# Patient Record
Sex: MALE | Race: WHITE | Employment: UNEMPLOYED | URBAN - METROPOLITAN AREA
[De-identification: names, ages, dates, MRNs, and addresses within clinical notes are randomized per-mention and may not be internally consistent; named-entity substitution may affect disease eponyms.]

---

## 2020-06-22 ENCOUNTER — OFFICE VISIT (OUTPATIENT)
Dept: PRIMARY CARE CLINIC | Age: 60
End: 2020-06-22
Payer: COMMERCIAL

## 2020-06-22 VITALS
WEIGHT: 192.6 LBS | HEART RATE: 84 BPM | HEIGHT: 69 IN | DIASTOLIC BLOOD PRESSURE: 83 MMHG | SYSTOLIC BLOOD PRESSURE: 143 MMHG | RESPIRATION RATE: 14 BRPM | TEMPERATURE: 98.1 F | BODY MASS INDEX: 28.53 KG/M2

## 2020-06-22 PROCEDURE — 99203 OFFICE O/P NEW LOW 30 MIN: CPT | Performed by: NURSE PRACTITIONER

## 2020-06-22 RX ORDER — OMEPRAZOLE 40 MG/1
40 CAPSULE, DELAYED RELEASE ORAL
Qty: 90 CAPSULE | Refills: 1 | Status: SHIPPED | OUTPATIENT
Start: 2020-06-22 | End: 2021-03-24 | Stop reason: SDUPTHER

## 2020-06-22 SDOH — ECONOMIC STABILITY: FOOD INSECURITY: WITHIN THE PAST 12 MONTHS, THE FOOD YOU BOUGHT JUST DIDN'T LAST AND YOU DIDN'T HAVE MONEY TO GET MORE.: NEVER TRUE

## 2020-06-22 SDOH — ECONOMIC STABILITY: TRANSPORTATION INSECURITY
IN THE PAST 12 MONTHS, HAS LACK OF TRANSPORTATION KEPT YOU FROM MEETINGS, WORK, OR FROM GETTING THINGS NEEDED FOR DAILY LIVING?: NO

## 2020-06-22 SDOH — ECONOMIC STABILITY: FOOD INSECURITY: WITHIN THE PAST 12 MONTHS, YOU WORRIED THAT YOUR FOOD WOULD RUN OUT BEFORE YOU GOT MONEY TO BUY MORE.: NEVER TRUE

## 2020-06-22 SDOH — ECONOMIC STABILITY: INCOME INSECURITY: HOW HARD IS IT FOR YOU TO PAY FOR THE VERY BASICS LIKE FOOD, HOUSING, MEDICAL CARE, AND HEATING?: NOT HARD AT ALL

## 2020-06-22 SDOH — ECONOMIC STABILITY: TRANSPORTATION INSECURITY
IN THE PAST 12 MONTHS, HAS THE LACK OF TRANSPORTATION KEPT YOU FROM MEDICAL APPOINTMENTS OR FROM GETTING MEDICATIONS?: NO

## 2020-06-22 ASSESSMENT — ENCOUNTER SYMPTOMS
GLOBUS SENSATION: 0
BELCHING: 0
VOMITING: 0
DIARRHEA: 0
SORE THROAT: 0
CHOKING: 0
WHEEZING: 0
CONSTIPATION: 0
SHORTNESS OF BREATH: 0
COUGH: 0
RHINORRHEA: 0
WATER BRASH: 0
HEARTBURN: 1
NAUSEA: 0
ABDOMINAL PAIN: 0
HOARSE VOICE: 0
STRIDOR: 0

## 2020-06-22 ASSESSMENT — PATIENT HEALTH QUESTIONNAIRE - PHQ9
SUM OF ALL RESPONSES TO PHQ QUESTIONS 1-9: 0
1. LITTLE INTEREST OR PLEASURE IN DOING THINGS: 0
SUM OF ALL RESPONSES TO PHQ9 QUESTIONS 1 & 2: 0
2. FEELING DOWN, DEPRESSED OR HOPELESS: 0
SUM OF ALL RESPONSES TO PHQ QUESTIONS 1-9: 0

## 2020-06-22 NOTE — PROGRESS NOTES
Name: Josie Carreno  : 1960         Chief Complaint:     Chief Complaint   Patient presents with    Establish Care     New patient. Former patient of Dr. Jeri Gaitan.  Weight Loss     \"30 pounds since March. \"        History of Present Illness:      Josie Carreno is a 64 y.o.  male who presents with Establish Care (New patient. Former patient of Dr. Jeri Gaitan. ) and Weight Loss (\"30 pounds since March. \" )      Milind Roland is here today to establish care. Unintended weight loss-patient states he is lost approximately 30 pounds in the last 3 months. Patient states he is a  and has been out of work and been actually more active since not working. 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 Patient's wife was concerned with his weight loss. Patient denies any fatigue, abdominal pain, nausea vomiting, bowel issues. Elevated blood pressure-readings in low 140s over 80s in the office. Patient has not been on medication for hypertension in the past.  We will continue to monitor. Gastroesophageal Reflux   He complains of heartburn. He reports no abdominal pain, no belching, no chest pain, no choking, no coughing, no dysphagia, no early satiety, no globus sensation, no hoarse voice, no nausea, no sore throat, no stridor, no tooth decay, no water brash or no wheezing. This is a chronic problem. The current episode started more than 1 year ago. The problem occurs occasionally. The problem has been unchanged. The heartburn duration is less than a minute. The heartburn is located in the substernum. The heartburn is of moderate intensity. The heartburn does not wake him from sleep. The heartburn does not limit his activity. The heartburn doesn't change with position. The symptoms are aggravated by certain foods, smoking and stress. Pertinent negatives include no anemia, fatigue, melena, muscle weakness, orthopnea or weight loss.  Risk factors include

## 2020-06-29 ENCOUNTER — HOSPITAL ENCOUNTER (OUTPATIENT)
Dept: GENERAL RADIOLOGY | Age: 60
Discharge: HOME | End: 2020-07-01
Payer: COMMERCIAL

## 2020-06-29 ENCOUNTER — TELEPHONE (OUTPATIENT)
Dept: PRIMARY CARE CLINIC | Age: 60
End: 2020-06-29

## 2020-06-29 ENCOUNTER — HOSPITAL ENCOUNTER (OUTPATIENT)
Age: 60
Discharge: HOME | End: 2020-06-29
Payer: COMMERCIAL

## 2020-06-29 LAB
ABSOLUTE EOS #: 0.13 K/UL (ref 0–0.44)
ABSOLUTE IMMATURE GRANULOCYTE: <0.03 K/UL (ref 0–0.3)
ABSOLUTE LYMPH #: 2.42 K/UL (ref 1.1–3.7)
ABSOLUTE MONO #: 0.94 K/UL (ref 0.1–1.2)
ALBUMIN SERPL-MCNC: 3.8 G/DL (ref 3.5–5.2)
ALBUMIN/GLOBULIN RATIO: 1.1 (ref 1–2.5)
ALP BLD-CCNC: 1148 U/L (ref 40–129)
ALT SERPL-CCNC: 126 U/L (ref 5–41)
ANION GAP SERPL CALCULATED.3IONS-SCNC: 12 MMOL/L (ref 9–17)
AST SERPL-CCNC: 137 U/L
BASOPHILS # BLD: 1 % (ref 0–2)
BASOPHILS ABSOLUTE: 0.06 K/UL (ref 0–0.2)
BILIRUB SERPL-MCNC: 1.86 MG/DL (ref 0.3–1.2)
BUN BLDV-MCNC: 8 MG/DL (ref 6–20)
BUN/CREAT BLD: 12 (ref 9–20)
C-REACTIVE PROTEIN: 18.9 MG/L (ref 0–5)
CALCIUM SERPL-MCNC: 9.5 MG/DL (ref 8.6–10.4)
CHLORIDE BLD-SCNC: 99 MMOL/L (ref 98–107)
CHOLESTEROL/HDL RATIO: 2.7
CHOLESTEROL: 267 MG/DL
CO2: 26 MMOL/L (ref 20–31)
CONTROL: PRESENT
CREAT SERPL-MCNC: 0.68 MG/DL (ref 0.7–1.2)
DIFFERENTIAL TYPE: ABNORMAL
EOSINOPHILS RELATIVE PERCENT: 1 % (ref 1–4)
GFR AFRICAN AMERICAN: >60 ML/MIN
GFR NON-AFRICAN AMERICAN: >60 ML/MIN
GFR SERPL CREATININE-BSD FRML MDRD: ABNORMAL ML/MIN/{1.73_M2}
GFR SERPL CREATININE-BSD FRML MDRD: ABNORMAL ML/MIN/{1.73_M2}
GLUCOSE BLD-MCNC: 110 MG/DL (ref 70–99)
HCT VFR BLD CALC: 47.2 % (ref 40.7–50.3)
HDLC SERPL-MCNC: 99 MG/DL
HEMOCCULT STL QL: POSITIVE
HEMOGLOBIN: 15.5 G/DL (ref 13–17)
HEPATITIS C ANTIBODY: NONREACTIVE
IMMATURE GRANULOCYTES: 0 %
LDL CHOLESTEROL: 153 MG/DL (ref 0–130)
LYMPHOCYTES # BLD: 24 % (ref 24–43)
MCH RBC QN AUTO: 34.9 PG (ref 25.2–33.5)
MCHC RBC AUTO-ENTMCNC: 32.8 G/DL (ref 28.4–34.8)
MCV RBC AUTO: 106.3 FL (ref 82.6–102.9)
MONOCYTES # BLD: 9 % (ref 3–12)
NRBC AUTOMATED: 0 PER 100 WBC
PDW BLD-RTO: 12.2 % (ref 11.8–14.4)
PLATELET # BLD: 239 K/UL (ref 138–453)
PLATELET ESTIMATE: ABNORMAL
PMV BLD AUTO: 9.3 FL (ref 8.1–13.5)
POTASSIUM SERPL-SCNC: 4.3 MMOL/L (ref 3.7–5.3)
RBC # BLD: 4.44 M/UL (ref 4.21–5.77)
RBC # BLD: ABNORMAL 10*6/UL
SEG NEUTROPHILS: 65 % (ref 36–65)
SEGMENTED NEUTROPHILS ABSOLUTE COUNT: 6.54 K/UL (ref 1.5–8.1)
SODIUM BLD-SCNC: 137 MMOL/L (ref 135–144)
TOTAL PROTEIN: 7.4 G/DL (ref 6.4–8.3)
TRIGL SERPL-MCNC: 77 MG/DL
TSH SERPL DL<=0.05 MIU/L-ACNC: 1.51 MIU/L (ref 0.3–5)
VLDLC SERPL CALC-MCNC: ABNORMAL MG/DL (ref 1–30)
WBC # BLD: 10.1 K/UL (ref 3.5–11.3)
WBC # BLD: ABNORMAL 10*3/UL

## 2020-06-29 PROCEDURE — 80053 COMPREHEN METABOLIC PANEL: CPT

## 2020-06-29 PROCEDURE — 85025 COMPLETE CBC W/AUTO DIFF WBC: CPT

## 2020-06-29 PROCEDURE — 86803 HEPATITIS C AB TEST: CPT

## 2020-06-29 PROCEDURE — 84443 ASSAY THYROID STIM HORMONE: CPT

## 2020-06-29 PROCEDURE — 86140 C-REACTIVE PROTEIN: CPT

## 2020-06-29 PROCEDURE — 82274 ASSAY TEST FOR BLOOD FECAL: CPT | Performed by: NURSE PRACTITIONER

## 2020-06-29 PROCEDURE — 71046 X-RAY EXAM CHEST 2 VIEWS: CPT

## 2020-06-29 PROCEDURE — 80061 LIPID PANEL: CPT

## 2020-06-29 PROCEDURE — 36415 COLL VENOUS BLD VENIPUNCTURE: CPT

## 2020-06-30 ENCOUNTER — TELEPHONE (OUTPATIENT)
Dept: PRIMARY CARE CLINIC | Age: 60
End: 2020-06-30

## 2020-07-03 PROBLEM — R19.5 POSITIVE FIT (FECAL IMMUNOCHEMICAL TEST): Status: ACTIVE | Noted: 2020-07-03

## 2020-07-03 RX ORDER — SODIUM, POTASSIUM,MAG SULFATES 17.5-3.13G
SOLUTION, RECONSTITUTED, ORAL ORAL
Qty: 2 BOTTLE | Refills: 0 | Status: SHIPPED | OUTPATIENT
Start: 2020-07-03 | End: 2020-08-06

## 2020-07-07 ENCOUNTER — TELEPHONE (OUTPATIENT)
Dept: PRIMARY CARE CLINIC | Age: 60
End: 2020-07-07

## 2020-07-20 ENCOUNTER — TELEPHONE (OUTPATIENT)
Dept: PRIMARY CARE CLINIC | Age: 60
End: 2020-07-20

## 2020-07-20 NOTE — TELEPHONE ENCOUNTER
Lefty message to call office regarding referral with Dr. Timmy Cheng, patient needs to schedule appointment.

## 2020-07-22 ENCOUNTER — TELEPHONE (OUTPATIENT)
Dept: PRIMARY CARE CLINIC | Age: 60
End: 2020-07-22

## 2020-07-28 ENCOUNTER — TELEPHONE (OUTPATIENT)
Dept: PRIMARY CARE CLINIC | Age: 60
End: 2020-07-28

## 2020-08-04 ENCOUNTER — HOSPITAL ENCOUNTER (OUTPATIENT)
Dept: CT IMAGING | Age: 60
Discharge: HOME | End: 2020-08-06
Payer: COMMERCIAL

## 2020-08-04 ENCOUNTER — HOSPITAL ENCOUNTER (OUTPATIENT)
Dept: LAB | Age: 60
Discharge: HOME | End: 2020-08-04
Payer: COMMERCIAL

## 2020-08-04 LAB
HAV IGM SER IA-ACNC: NONREACTIVE
HEPATITIS B CORE IGM ANTIBODY: NONREACTIVE
HEPATITIS B SURFACE ANTIGEN: NONREACTIVE
HEPATITIS C ANTIBODY: NONREACTIVE

## 2020-08-04 PROCEDURE — 6360000004 HC RX CONTRAST MEDICATION: Performed by: NURSE PRACTITIONER

## 2020-08-04 PROCEDURE — 80074 ACUTE HEPATITIS PANEL: CPT

## 2020-08-04 PROCEDURE — 74177 CT ABD & PELVIS W/CONTRAST: CPT

## 2020-08-04 PROCEDURE — 36415 COLL VENOUS BLD VENIPUNCTURE: CPT

## 2020-08-04 RX ADMIN — IOPAMIDOL 75 ML: 755 INJECTION, SOLUTION INTRAVENOUS at 17:27

## 2020-08-04 RX ADMIN — IOPAMIDOL 18 ML: 755 INJECTION, SOLUTION INTRAVENOUS at 16:28

## 2020-08-05 ENCOUNTER — TELEPHONE (OUTPATIENT)
Dept: PRIMARY CARE CLINIC | Age: 60
End: 2020-08-05

## 2020-08-05 NOTE — TELEPHONE ENCOUNTER
Phone call to Kel Torrez in scheduling regarding stat US Gallbladder. Kel Torrez stated that she had already contacted patient and that patient ate today and did not want to fast all day. She also stated that patient said that mornings worked better for him and that [address] was scheduled for Friday. Manpreet Sanchez, DEANNA notified.

## 2020-08-05 NOTE — TELEPHONE ENCOUNTER
8/4/2020 1901- Received call from answering service regarding abnormal CT scan of patient's abdomen. 8/4/2020 1936- Attempted to call patient with no answer, voicemail left to contact me regarding CT scan. 8/4/2020 1941-patient called back. He denies any fever, chills, nausea, vomiting or abdominal pain. Overall he feels well. Discussed results of CT scan. Let him know our office would contact him in the morning for further testing and referral to gastroenterologist.    8/5/2020- 0815-contacted patient to let them know I would like to get ultrasound of his gallbladder today. Discussed that further testing that needs to be done cannot be done in [place]. He is agreeable to see a gastroenterologist and [place].

## 2020-08-06 ENCOUNTER — TELEPHONE (OUTPATIENT)
Dept: PRIMARY CARE CLINIC | Age: 60
End: 2020-08-06

## 2020-08-06 ENCOUNTER — HOSPITAL ENCOUNTER (INPATIENT)
Age: 60
LOS: 3 days | Discharge: ANOTHER HOSPITAL | DRG: 445 | End: 2020-08-09
Attending: INTERNAL MEDICINE | Admitting: INTERNAL MEDICINE
Payer: COMMERCIAL

## 2020-08-06 ENCOUNTER — HOSPITAL ENCOUNTER (OUTPATIENT)
Dept: ULTRASOUND IMAGING | Age: 60
Discharge: HOME | End: 2020-08-08
Payer: COMMERCIAL

## 2020-08-06 ENCOUNTER — HOSPITAL ENCOUNTER (EMERGENCY)
Age: 60
Discharge: ANOTHER HOSPITAL | End: 2020-08-06
Payer: COMMERCIAL

## 2020-08-06 VITALS
OXYGEN SATURATION: 96 % | RESPIRATION RATE: 16 BRPM | BODY MASS INDEX: 28.8 KG/M2 | HEART RATE: 73 BPM | DIASTOLIC BLOOD PRESSURE: 86 MMHG | WEIGHT: 195 LBS | TEMPERATURE: 98.6 F | SYSTOLIC BLOOD PRESSURE: 146 MMHG

## 2020-08-06 PROBLEM — K76.6 PORTAL HYPERTENSION (HCC): Status: ACTIVE | Noted: 2020-08-06

## 2020-08-06 PROBLEM — K81.9 CHOLECYSTITIS: Status: ACTIVE | Noted: 2020-08-06

## 2020-08-06 PROBLEM — Z78.9 ALCOHOL USE: Status: ACTIVE | Noted: 2020-08-06

## 2020-08-06 PROBLEM — K21.9 GASTROESOPHAGEAL REFLUX DISEASE WITHOUT ESOPHAGITIS: Status: ACTIVE | Noted: 2020-08-06

## 2020-08-06 LAB
ABSOLUTE EOS #: 0.12 K/UL (ref 0–0.44)
ABSOLUTE IMMATURE GRANULOCYTE: 0.03 K/UL (ref 0–0.3)
ABSOLUTE LYMPH #: 1.95 K/UL (ref 1.1–3.7)
ABSOLUTE MONO #: 0.64 K/UL (ref 0.1–1.2)
ALBUMIN SERPL-MCNC: 3.9 G/DL (ref 3.5–5.2)
ALBUMIN/GLOBULIN RATIO: 1.3 (ref 1–2.5)
ALP BLD-CCNC: 1073 U/L (ref 40–129)
ALT SERPL-CCNC: 214 U/L (ref 5–41)
ANION GAP SERPL CALCULATED.3IONS-SCNC: 11 MMOL/L (ref 9–17)
AST SERPL-CCNC: 222 U/L
BASOPHILS # BLD: 0 % (ref 0–2)
BASOPHILS ABSOLUTE: 0.03 K/UL (ref 0–0.2)
BILIRUB SERPL-MCNC: 2.76 MG/DL (ref 0.3–1.2)
BUN BLDV-MCNC: 12 MG/DL (ref 6–20)
BUN/CREAT BLD: 18 (ref 9–20)
CALCIUM SERPL-MCNC: 9.4 MG/DL (ref 8.6–10.4)
CHLORIDE BLD-SCNC: 103 MMOL/L (ref 98–107)
CO2: 25 MMOL/L (ref 20–31)
CREAT SERPL-MCNC: 0.65 MG/DL (ref 0.7–1.2)
DIFFERENTIAL TYPE: ABNORMAL
EOSINOPHILS RELATIVE PERCENT: 1 % (ref 1–4)
GFR AFRICAN AMERICAN: >60 ML/MIN
GFR NON-AFRICAN AMERICAN: >60 ML/MIN
GFR SERPL CREATININE-BSD FRML MDRD: ABNORMAL ML/MIN/{1.73_M2}
GFR SERPL CREATININE-BSD FRML MDRD: ABNORMAL ML/MIN/{1.73_M2}
GLUCOSE BLD-MCNC: 122 MG/DL (ref 70–99)
HCT VFR BLD CALC: 41.1 % (ref 40.7–50.3)
HEMOGLOBIN: 14.1 G/DL (ref 13–17)
IMMATURE GRANULOCYTES: 0 %
LIPASE: 54 U/L (ref 13–60)
LYMPHOCYTES # BLD: 20 % (ref 24–43)
MCH RBC QN AUTO: 35.3 PG (ref 25.2–33.5)
MCHC RBC AUTO-ENTMCNC: 34.3 G/DL (ref 28.4–34.8)
MCV RBC AUTO: 102.8 FL (ref 82.6–102.9)
MONOCYTES # BLD: 7 % (ref 3–12)
NRBC AUTOMATED: 0 PER 100 WBC
PDW BLD-RTO: 12.3 % (ref 11.8–14.4)
PLATELET # BLD: 207 K/UL (ref 138–453)
PLATELET ESTIMATE: ABNORMAL
PMV BLD AUTO: 9.2 FL (ref 8.1–13.5)
POTASSIUM SERPL-SCNC: 3.7 MMOL/L (ref 3.7–5.3)
RBC # BLD: 4 M/UL (ref 4.21–5.77)
RBC # BLD: ABNORMAL 10*6/UL
SARS-COV-2, PCR: NORMAL
SARS-COV-2, RAPID: NOT DETECTED
SARS-COV-2: NORMAL
SEG NEUTROPHILS: 72 % (ref 36–65)
SEGMENTED NEUTROPHILS ABSOLUTE COUNT: 7.11 K/UL (ref 1.5–8.1)
SODIUM BLD-SCNC: 139 MMOL/L (ref 135–144)
SOURCE: NORMAL
TOTAL PROTEIN: 6.9 G/DL (ref 6.4–8.3)
WBC # BLD: 9.9 K/UL (ref 3.5–11.3)
WBC # BLD: ABNORMAL 10*3/UL

## 2020-08-06 PROCEDURE — 85025 COMPLETE CBC W/AUTO DIFF WBC: CPT

## 2020-08-06 PROCEDURE — 36415 COLL VENOUS BLD VENIPUNCTURE: CPT

## 2020-08-06 PROCEDURE — C9113 INJ PANTOPRAZOLE SODIUM, VIA: HCPCS | Performed by: CLINICAL NURSE SPECIALIST

## 2020-08-06 PROCEDURE — 2580000003 HC RX 258: Performed by: CLINICAL NURSE SPECIALIST

## 2020-08-06 PROCEDURE — 1200000000 HC SEMI PRIVATE

## 2020-08-06 PROCEDURE — 6360000002 HC RX W HCPCS: Performed by: CLINICAL NURSE SPECIALIST

## 2020-08-06 PROCEDURE — 83690 ASSAY OF LIPASE: CPT

## 2020-08-06 PROCEDURE — U0002 COVID-19 LAB TEST NON-CDC: HCPCS

## 2020-08-06 PROCEDURE — 76705 ECHO EXAM OF ABDOMEN: CPT

## 2020-08-06 PROCEDURE — 99223 1ST HOSP IP/OBS HIGH 75: CPT | Performed by: NURSE PRACTITIONER

## 2020-08-06 PROCEDURE — 99283 EMERGENCY DEPT VISIT LOW MDM: CPT

## 2020-08-06 PROCEDURE — 80053 COMPREHEN METABOLIC PANEL: CPT

## 2020-08-06 RX ORDER — PANTOPRAZOLE SODIUM 40 MG/10ML
40 INJECTION, POWDER, LYOPHILIZED, FOR SOLUTION INTRAVENOUS DAILY
Status: DISCONTINUED | OUTPATIENT
Start: 2020-08-06 | End: 2020-08-08

## 2020-08-06 RX ORDER — ONDANSETRON 2 MG/ML
4 INJECTION INTRAMUSCULAR; INTRAVENOUS EVERY 6 HOURS PRN
Status: DISCONTINUED | OUTPATIENT
Start: 2020-08-06 | End: 2020-08-09 | Stop reason: HOSPADM

## 2020-08-06 RX ORDER — ACETAMINOPHEN 650 MG/1
650 SUPPOSITORY RECTAL EVERY 6 HOURS PRN
Status: DISCONTINUED | OUTPATIENT
Start: 2020-08-06 | End: 2020-08-08

## 2020-08-06 RX ORDER — MAGNESIUM SULFATE 1 G/100ML
1 INJECTION INTRAVENOUS PRN
Status: DISCONTINUED | OUTPATIENT
Start: 2020-08-06 | End: 2020-08-09 | Stop reason: HOSPADM

## 2020-08-06 RX ORDER — PROMETHAZINE HYDROCHLORIDE 25 MG/1
12.5 TABLET ORAL EVERY 6 HOURS PRN
Status: DISCONTINUED | OUTPATIENT
Start: 2020-08-06 | End: 2020-08-09 | Stop reason: HOSPADM

## 2020-08-06 RX ORDER — POLYETHYLENE GLYCOL 3350 17 G/17G
17 POWDER, FOR SOLUTION ORAL DAILY PRN
Status: DISCONTINUED | OUTPATIENT
Start: 2020-08-06 | End: 2020-08-09 | Stop reason: HOSPADM

## 2020-08-06 RX ORDER — ACETAMINOPHEN 325 MG/1
650 TABLET ORAL EVERY 6 HOURS PRN
Status: DISCONTINUED | OUTPATIENT
Start: 2020-08-06 | End: 2020-08-08

## 2020-08-06 RX ORDER — SODIUM CHLORIDE 0.9 % (FLUSH) 0.9 %
10 SYRINGE (ML) INJECTION EVERY 12 HOURS SCHEDULED
Status: DISCONTINUED | OUTPATIENT
Start: 2020-08-06 | End: 2020-08-09 | Stop reason: HOSPADM

## 2020-08-06 RX ORDER — POTASSIUM CHLORIDE 7.45 MG/ML
10 INJECTION INTRAVENOUS PRN
Status: DISCONTINUED | OUTPATIENT
Start: 2020-08-06 | End: 2020-08-09 | Stop reason: HOSPADM

## 2020-08-06 RX ORDER — SODIUM CHLORIDE 9 MG/ML
10 INJECTION INTRAVENOUS DAILY
Status: DISCONTINUED | OUTPATIENT
Start: 2020-08-06 | End: 2020-08-08

## 2020-08-06 RX ORDER — POTASSIUM CHLORIDE 20 MEQ/1
40 TABLET, EXTENDED RELEASE ORAL PRN
Status: DISCONTINUED | OUTPATIENT
Start: 2020-08-06 | End: 2020-08-09 | Stop reason: HOSPADM

## 2020-08-06 RX ORDER — SODIUM CHLORIDE 9 MG/ML
INJECTION, SOLUTION INTRAVENOUS CONTINUOUS
Status: DISCONTINUED | OUTPATIENT
Start: 2020-08-06 | End: 2020-08-09 | Stop reason: HOSPADM

## 2020-08-06 RX ORDER — SODIUM CHLORIDE 0.9 % (FLUSH) 0.9 %
10 SYRINGE (ML) INJECTION PRN
Status: DISCONTINUED | OUTPATIENT
Start: 2020-08-06 | End: 2020-08-09 | Stop reason: HOSPADM

## 2020-08-06 RX ORDER — NICOTINE 21 MG/24HR
1 PATCH, TRANSDERMAL 24 HOURS TRANSDERMAL DAILY PRN
Status: DISCONTINUED | OUTPATIENT
Start: 2020-08-06 | End: 2020-08-09 | Stop reason: HOSPADM

## 2020-08-06 RX ADMIN — SODIUM CHLORIDE: 9 INJECTION, SOLUTION INTRAVENOUS at 20:42

## 2020-08-06 RX ADMIN — Medication 10 ML: at 21:26

## 2020-08-06 RX ADMIN — PANTOPRAZOLE SODIUM 40 MG: 40 INJECTION, POWDER, FOR SOLUTION INTRAVENOUS at 21:26

## 2020-08-06 ASSESSMENT — PAIN SCALES - GENERAL: PAINLEVEL_OUTOF10: 0

## 2020-08-06 ASSESSMENT — ENCOUNTER SYMPTOMS
EYES NEGATIVE: 1
GASTROINTESTINAL NEGATIVE: 1
RESPIRATORY NEGATIVE: 1

## 2020-08-06 NOTE — TELEPHONE ENCOUNTER
Phone call from Veronique Red at Dr. Lamar Res office who stated that Dr. Cheung Adjutant reviewed patient chart and suggests patient go to [facility] or [facility] for admission. States \"He is acute and may need drained, worked up, HIDA Scan and admission. \"

## 2020-08-06 NOTE — ED NOTES
Patient is going by private vehicle to [facility], no distress noted at this time.      Mary Arroyo RN  08/06/20 9341

## 2020-08-06 NOTE — ED NOTES
Call  [facility]     Juan Ramon Dean  08/06/20 2730

## 2020-08-06 NOTE — ED PROVIDER NOTES
[facility]  EMERGENCY DEPARTMENT ENCOUNTER      Pt Name: Yoel Barragan  MRN: [id number]  [place] 1960  Date of evaluation: 8/6/2020  Provider: Radha Mckenzie PA-C    CHIEF COMPLAINT       Chief Complaint   Patient presents with    Other     pt states he had a CT this am and was told to come to the ER to be transferred to [facility] for a mass in his gallbladder       HISTORY OF PRESENT ILLNESS    Yoel Barragan is a 64 y.o. male who presents to the emergency department from home to be transferred to [facility].  Patient had a CT scan yesterday and an ultrasound today which were reviewed by the surgeon on-call and thought that it was cholecystitis and may need a drain and could not care for him at this hospital so they recommended he be transferred to [address] [place] [address] in [facility] which is where the patient would like to go. This was discussed with the patient's PCP who then recommended that he come to [facility], ER so that and a transfer can be arranged. He states he is not having much pain he states the pain that he gets in his upper abdomen comes in waves and can be intense but this is only happened 3 times and he really has only minimal discomfort at this time with no nausea vomiting fevers chills. Denies chest pain cough shortness of breath or sore throat. Triage notes and Nursing notes were reviewed by myself. Any discrepancies are addressed above. PAST MEDICAL HISTORY     Past Medical History:   Diagnosis Date    Anxiety     Chronic back pain     Depression        SURGICAL HISTORY       Past Surgical History:   Procedure Laterality Date    MANDIBLE SURGERY         CURRENT MEDICATIONS       Previous Medications    IBUPROFEN (ADVIL;MOTRIN) 600 MG TABLET    Take 1 tablet by mouth 3 times daily (with meals) for 7 days.     OMEPRAZOLE (PRILOSEC) 40 MG DELAYED RELEASE CAPSULE    Take 1 capsule by mouth every morning (before breakfast)       ALLERGIES     Asa [aspirin] and Chocolate    FAMILY HISTORY       Family History   Problem Relation Age of Onset    Hypertension Mother     Stroke Mother     High Blood Pressure Mother     Diabetes Father     Hypertension Father     High Blood Pressure Father     Heart Disease Father         SOCIAL HISTORY       Social History     Socioeconomic History    Marital status:      Spouse name: Aliya    Number of children: 2    Years of education: 15    Highest education level: None   Occupational History    Occupation:    Social Needs    Financial resource strain: Not hard at all   Mark-Shantanu insecurity     Worry: Never true     Inability: Never true    Transportation needs     Medical: No     Non-medical: No   Tobacco Use    Smoking status: Current Every Day Smoker     Packs/day: 1.00     Years: 20.00     Pack years: 20.00     Types: Cigarettes    Smokeless tobacco: Never Used   Substance and Sexual Activity    Alcohol use: Yes     Alcohol/week: 5.8 standard drinks     Types: 7 Standard drinks or equivalent per week     Comment: Occ    Drug use: No    Sexual activity: Yes   Lifestyle    Physical activity     Days per week: None     Minutes per session: None    Stress: None   Relationships    Social connections     Talks on phone: None     Gets together: None     Attends Confucianist service: None     Active member of club or organization: None     Attends meetings of clubs or organizations: None     Relationship status: None    Intimate partner violence     Fear of current or ex partner: None     Emotionally abused: None     Physically abused: None     Forced sexual activity: None   Other Topics Concern    None   Social History Narrative    None       REVIEW OF SYSTEMS     Review of Systems  Except as noted above the remainder of the review of systems was reviewed and is negative.      SCREENINGS           PHYSICAL EXAM    (up to 7 for level 4, 8 or more for level 5)     ED Triage Vitals [08/06/20 1459]   BP Temp Temp Source Pulse Resp SpO2 Height Weight   (!) 155/88 98.6 °F (37 °C) Tympanic 73 16 98 % -- 195 lb (88.5 kg)       Physical Exam  Active and oriented ×3. Nontoxic. No acute distress. Well-hydrated. Head is atraumatic, facies symmetrical.  Pupils equal round and reactive to light, extraocular movements intact, anterior chambers clear bilaterally  Neck is supple. No adenopathy. Respirations nonlabored. Lungs clear to auscultation. No wheezes rales or rhonchi noted. Heart regular rate and rhythm. No murmur noted  Abdomen positive bowel sounds, no bruit. soft with minimal tenderness in the epigastric and right upper quadrant. Skin free of any obvious rashes or lesions. Extremities without edema. No calf tenderness noted. Distal pulses and sensation intact. Good capillary refill noted. No acute neurologic deficit noted. Good gait and balance. Clear speech. Good affect. Pleasant patient. DIAGNOSTIC RESULTS     RADIOLOGY: (none if blank)   Interpretation per the Radiologistbelow, if available at the time of this note:    No orders to display     EXAMINATION:    RIGHT UPPER QUADRANT ULTRASOUND         8/6/2020 7:13 am         COMPARISON:    CT dated 08/04/2020         HISTORY:    ORDERING SYSTEM PROVIDED HISTORY: Intrahepatic bile duct dilation         FINDINGS:    LIVER:  The liver is diffusely increased and coarsened in echotexture. Multiple hepatic cysts are noted, measuring up to 5.2 cm.  There is diffuse    intrahepatic biliary dilatation.  Main portal vein is patent.         BILIARY SYSTEM:  Diffuse low-level mobile echoes are filling the gallbladder    lumen, suggesting gallbladder sludge.  Color Doppler is demonstrated in the    lumen of the gallbladder, but this is likely artifact due to mobile sludge.     The gallbladder wall is thickened and indistinct, measuring approximately 0.5    cm.  Gallstones are noted. [place] is a small amount of pericholecystic fluid.         Common bile duct is dilated, measuring up to 1 cm.  No intraluminal filling    defect seen in the common bile duct.  Sonographer reports a negative Logan's    sign.         RIGHT KIDNEY: No hydronephrosis. Meagan Yari is an isoechoic mass in the central    portion of the right kidney, most likely a hypertrophied column of Guillermo,    based on CT appearance.         PANCREAS:  Visualized portions of the pancreas are unremarkable.  No    pancreatic ductal dilatation.         OTHER: No evidence of right upper quadrant ascites. 1.  Gallbladder is filled with sludge and stones in addition to gallbladder    wall thickening and pericholecystic fluid.  Overall, findings are concerning    for acute cholecystitis.  However, sonographer reports a negative Logan's    sign.  There is Doppler color within the gallbladder lumen, but this is    likely artifactual related to mobile sludge.  Possibility of an underlying    isoechoic polyp or mass would be difficult to exclude.         2.  Intrahepatic and extrahepatic biliary dilatation.  Common bile duct    measures up to 1 cm.  Additional evaluation with MRCP/ERCP is recommended.         3.  Hepatic cysts.         4.  Increased and coarsened echotexture of the liver, which can be seen with    diffuse hepatocellular disease, most commonly hepatic steatosis.         5.  Isoechoic mass seen in the central right kidney is most likely a normal    variant column of Guillermo. EXAMINATION:    CT OF THE ABDOMEN AND PELVIS WITH CONTRAST, 8/4/2020 5:26 pm         TECHNIQUE:    CT of the abdomen and pelvis was performed with the administration of    intravenous contrast. Multiplanar reformatted images are provided for review.     Dose modulation, iterative reconstruction, and/or weight based adjustment of    the mA/kV was utilized to reduce the radiation dose to as low as reasonably    achievable.         COMPARISON:    None         HISTORY:    ORDERING SYSTEM PROVIDED HISTORY: Elevated liver enzymes TECHNOLOGIST PROVIDED HISTORY:         FINDINGS:    Lower Chest: No acute infiltrate at the lung bases.         Organs: Mild hepatomegaly.  No significant cirrhotic changes are identified. There are multiple low-attenuation hepatic lesions most consistent with    simple cysts.  The largest in the left lobe anteriorly measures 5.0 cm. There is moderate intrahepatic biliary dilatation.  The common bile duct is    not dilated.  Increased attenuation of the gallbladder lumen with gallbladder    wall thickening and small calculi.  Mild splenomegaly.  The pancreas and    adrenal glands are unremarkable.  No renal mass or significant hydronephrosis.         GI/Bowel: No pericolonic inflammatory changes.  The appendix is unremarkable. There is no small bowel distension.  The stomach and duodenal sweep are    intact.         Pelvis: There is no pelvic mass or free pelvic fluid.  The prostate is    borderline in size.  Mild distention of the urinary bladder.         Peritoneum/Retroperitoneum: The abdominal aorta is normal in caliber with    mild calcified atherosclerotic plaque.  No significant retroperitoneal    adenopathy.  No upper abdominal ascites.         The splenic vein, portal vein and SMV are all patent.  Multiple prominent    upper abdominal varices as well as distal esophageal varices are noted.  The    umbilical vein is enlarged.         Bones/Soft Tissues: No acute osseous or soft tissue abnormality.  Small fat    containing umbilical hernia.  Degenerative disc disease at L5-S1.              Impression    1. Gallbladder wall thickening with cholelithiasis and increased attenuation    of the gallbladder lumen.  Differential includes acute cholecystitis. 2. Intrahepatic biliary dilatation with nondilated common bile duct.  This    may be related to the adjacent inflammatory changes within the gallbladder. Alternatively, tumor such as a Klatskin's tumor are possibilities.     3. Portal hypertension with enlarged upper abdominal varices and distal    esophageal varices.  Mild splenomegaly.  No significant ascites. Hepatomegaly.  Multiple hepatic lesions most consistent with simple cysts. 4. No acute bowel pathology. Findings were discussed with Dr. Moses Sanchez at 7:05 pm on 8/4/2020.         RECOMMENDATIONS:    Recommend follow-up evaluation of the gallbladder with ultrasound. Additional evaluation of the biliary tract findings can be performed with    MRCP or ERCP.                  LABS:  Labs Reviewed   CBC WITH AUTO DIFFERENTIAL - Abnormal; Notable for the following components:       Result Value    RBC 4.00 (*)     MCH 35.3 (*)     Seg Neutrophils 72 (*)     Lymphocytes 20 (*)     All other components within normal limits   COMPREHENSIVE METABOLIC PANEL W/ REFLEX TO MG FOR LOW K - Abnormal; Notable for the following components:    Glucose 122 (*)     CREATININE 0.65 (*)     Alkaline Phosphatase 1,073 (*)      (*)      (*)     Total Bilirubin 2.76 (*)     All other components within normal limits   LIPASE   COVID-19       All other labs were within normal range or not returned as of this dictation. EMERGENCY DEPARTMENT COURSE andMedical Decision Making:     Vitals:    Vitals:    08/06/20 1459   BP: (!) 155/88   Pulse: 73   Resp: 16   Temp: 98.6 °F (37 °C)   TempSrc: Tympanic   SpO2: 98%   Weight: 195 lb (88.5 kg)       MDM/     Patient has elevated LFTs and findings consistent with cholecystitis on CT scan performed earlier today. The surgeon on-call at this hospital, Dr. Josafat Tse, reviewed the imaging studies and felt the patient should not be cared for at this hospital but needed a higher level of care the patient wants to go to [address] [place] [address] have spoken to the hospitalist at [address] [place] [address] who accepts the patient in transfer under Dr. Moy Manrique.         ED Medications administered this visit:  Medications - No data to display    CONSULTS: (None if blank)  None    Procedures: (None if blank)       CLINICAL       1.  Cholecystitis          DISPOSITION/PLAN   DISPOSITION Decision To Transfer 08/06/2020 03:47:46 PM          (Please note that portions of this note were completed with a voice recognition program.  Efforts were made to edit the dictations but occasionallywords are mis-transcribed.)      Burdette Olszewski II, PA-C (electronically signed)           Burdette Olszewski II, PA-C  08/06/20 5926

## 2020-08-07 ENCOUNTER — ANESTHESIA EVENT (OUTPATIENT)
Dept: OPERATING ROOM | Age: 60
DRG: 445 | End: 2020-08-07
Payer: COMMERCIAL

## 2020-08-07 ENCOUNTER — ANESTHESIA (OUTPATIENT)
Dept: OPERATING ROOM | Age: 60
DRG: 445 | End: 2020-08-07
Payer: COMMERCIAL

## 2020-08-07 ENCOUNTER — APPOINTMENT (OUTPATIENT)
Dept: GENERAL RADIOLOGY | Age: 60
DRG: 445 | End: 2020-08-07
Attending: INTERNAL MEDICINE
Payer: COMMERCIAL

## 2020-08-07 VITALS — DIASTOLIC BLOOD PRESSURE: 109 MMHG | TEMPERATURE: 96.1 F | SYSTOLIC BLOOD PRESSURE: 186 MMHG | OXYGEN SATURATION: 98 %

## 2020-08-07 PROBLEM — K83.8 INTRAHEPATIC BILE DUCT DILATION: Status: ACTIVE | Noted: 2020-08-07

## 2020-08-07 PROBLEM — F10.10 ALCOHOL ABUSE: Status: ACTIVE | Noted: 2020-08-07

## 2020-08-07 PROBLEM — I85.00 ESOPHAGEAL VARICES WITHOUT BLEEDING (HCC): Status: ACTIVE | Noted: 2020-08-07

## 2020-08-07 PROBLEM — R17 ELEVATED BILIRUBIN: Status: ACTIVE | Noted: 2020-08-07

## 2020-08-07 PROBLEM — R97.8 ELEVATED CA 19-9 LEVEL: Status: ACTIVE | Noted: 2020-08-07

## 2020-08-07 PROBLEM — C24.0 KLATSKIN'S TUMOR (HCC): Status: ACTIVE | Noted: 2020-08-07

## 2020-08-07 LAB
ALBUMIN SERPL-MCNC: 3.3 G/DL (ref 3.5–5.2)
ALBUMIN/GLOBULIN RATIO: 1.1 (ref 1–2.5)
ALP BLD-CCNC: 1031 U/L (ref 40–129)
ALT SERPL-CCNC: 197 U/L (ref 5–41)
ANION GAP SERPL CALCULATED.3IONS-SCNC: 12 MMOL/L (ref 9–17)
AST SERPL-CCNC: 217 U/L
BILIRUB SERPL-MCNC: 4.12 MG/DL (ref 0.3–1.2)
BILIRUBIN DIRECT: 3.47 MG/DL
BILIRUBIN, INDIRECT: 0.65 MG/DL (ref 0–1)
BUN BLDV-MCNC: 8 MG/DL (ref 6–20)
CA 19-9: 225 U/ML (ref 0–35)
CA 19-9: 232 U/ML (ref 0–35)
CALCIUM SERPL-MCNC: 8.8 MG/DL (ref 8.6–10.4)
CARCINOEMBRYONIC ANTIGEN: 3.8 NG/ML
CASE NUMBER:: NORMAL
CHLORIDE BLD-SCNC: 104 MMOL/L (ref 98–107)
CO2: 22 MMOL/L (ref 20–31)
CREAT SERPL-MCNC: 0.52 MG/DL (ref 0.7–1.2)
GFR AFRICAN AMERICAN: >60 ML/MIN
GFR NON-AFRICAN AMERICAN: >60 ML/MIN
GFR SERPL CREATININE-BSD FRML MDRD: ABNORMAL ML/MIN/{1.73_M2}
GFR SERPL CREATININE-BSD FRML MDRD: ABNORMAL ML/MIN/{1.73_M2}
GLUCOSE BLD-MCNC: 105 MG/DL (ref 70–99)
HCT VFR BLD CALC: 41.8 % (ref 40.7–50.3)
HEMOGLOBIN: 13.9 G/DL (ref 13–17)
INR BLD: 0.9
MCH RBC QN AUTO: 34.8 PG (ref 25.2–33.5)
MCHC RBC AUTO-ENTMCNC: 33.3 G/DL (ref 28.4–34.8)
MCV RBC AUTO: 104.5 FL (ref 82.6–102.9)
NRBC AUTOMATED: 0 PER 100 WBC
PDW BLD-RTO: 12.7 % (ref 11.8–14.4)
PLATELET # BLD: 196 K/UL (ref 138–453)
PMV BLD AUTO: 9.7 FL (ref 8.1–13.5)
POTASSIUM SERPL-SCNC: 4.1 MMOL/L (ref 3.7–5.3)
PROTHROMBIN TIME: 9.8 SEC (ref 9–12)
RBC # BLD: 4 M/UL (ref 4.21–5.77)
SODIUM BLD-SCNC: 138 MMOL/L (ref 135–144)
SPECIMEN DESCRIPTION: NORMAL
TOTAL PROTEIN: 6.2 G/DL (ref 6.4–8.3)
WBC # BLD: 7.8 K/UL (ref 3.5–11.3)

## 2020-08-07 PROCEDURE — C1769 GUIDE WIRE: HCPCS | Performed by: INTERNAL MEDICINE

## 2020-08-07 PROCEDURE — 43274 ERCP DUCT STENT PLACEMENT: CPT | Performed by: INTERNAL MEDICINE

## 2020-08-07 PROCEDURE — 82378 CARCINOEMBRYONIC ANTIGEN: CPT

## 2020-08-07 PROCEDURE — 6360000002 HC RX W HCPCS: Performed by: INTERNAL MEDICINE

## 2020-08-07 PROCEDURE — 85610 PROTHROMBIN TIME: CPT

## 2020-08-07 PROCEDURE — 86301 IMMUNOASSAY TUMOR CA 19-9: CPT

## 2020-08-07 PROCEDURE — 2580000003 HC RX 258: Performed by: INTERNAL MEDICINE

## 2020-08-07 PROCEDURE — 88305 TISSUE EXAM BY PATHOLOGIST: CPT

## 2020-08-07 PROCEDURE — 1200000000 HC SEMI PRIVATE

## 2020-08-07 PROCEDURE — C9113 INJ PANTOPRAZOLE SODIUM, VIA: HCPCS | Performed by: INTERNAL MEDICINE

## 2020-08-07 PROCEDURE — 80053 COMPREHEN METABOLIC PANEL: CPT

## 2020-08-07 PROCEDURE — C2625 STENT, NON-COR, TEM W/DEL SY: HCPCS | Performed by: INTERNAL MEDICINE

## 2020-08-07 PROCEDURE — 74330 X-RAY BILE/PANC ENDOSCOPY: CPT

## 2020-08-07 PROCEDURE — 3700000001 HC ADD 15 MINUTES (ANESTHESIA): Performed by: INTERNAL MEDICINE

## 2020-08-07 PROCEDURE — 3609015100 HC ERCP STENT PLACEMENT BILIARY/PANCREATIC DUCT: Performed by: INTERNAL MEDICINE

## 2020-08-07 PROCEDURE — 88112 CYTOPATH CELL ENHANCE TECH: CPT

## 2020-08-07 PROCEDURE — 7100000000 HC PACU RECOVERY - FIRST 15 MIN: Performed by: INTERNAL MEDICINE

## 2020-08-07 PROCEDURE — 99253 IP/OBS CNSLTJ NEW/EST LOW 45: CPT | Performed by: INTERNAL MEDICINE

## 2020-08-07 PROCEDURE — 6370000000 HC RX 637 (ALT 250 FOR IP): Performed by: INTERNAL MEDICINE

## 2020-08-07 PROCEDURE — 6360000002 HC RX W HCPCS: Performed by: SPECIALIST

## 2020-08-07 PROCEDURE — 99233 SBSQ HOSP IP/OBS HIGH 50: CPT | Performed by: INTERNAL MEDICINE

## 2020-08-07 PROCEDURE — 7100000001 HC PACU RECOVERY - ADDTL 15 MIN: Performed by: INTERNAL MEDICINE

## 2020-08-07 PROCEDURE — 2720000010 HC SURG SUPPLY STERILE: Performed by: INTERNAL MEDICINE

## 2020-08-07 PROCEDURE — 2709999900 HC NON-CHARGEABLE SUPPLY: Performed by: INTERNAL MEDICINE

## 2020-08-07 PROCEDURE — 6360000002 HC RX W HCPCS: Performed by: ANESTHESIOLOGY

## 2020-08-07 PROCEDURE — 36415 COLL VENOUS BLD VENIPUNCTURE: CPT

## 2020-08-07 PROCEDURE — 2500000003 HC RX 250 WO HCPCS: Performed by: SPECIALIST

## 2020-08-07 PROCEDURE — 82248 BILIRUBIN DIRECT: CPT

## 2020-08-07 PROCEDURE — 85027 COMPLETE CBC AUTOMATED: CPT

## 2020-08-07 PROCEDURE — 3700000000 HC ANESTHESIA ATTENDED CARE: Performed by: INTERNAL MEDICINE

## 2020-08-07 PROCEDURE — 0F758DZ DILATION OF RIGHT HEPATIC DUCT WITH INTRALUMINAL DEVICE, VIA NATURAL OR ARTIFICIAL OPENING ENDOSCOPIC: ICD-10-PCS | Performed by: INTERNAL MEDICINE

## 2020-08-07 DEVICE — BILIARY STENT WITH NAVIFLEXTM RX DELIVERY SYSTEM
Type: IMPLANTABLE DEVICE | Site: BILE DUCT | Status: FUNCTIONAL
Brand: ADVANIX™ BILIARY

## 2020-08-07 RX ORDER — FENTANYL CITRATE 50 UG/ML
50 INJECTION, SOLUTION INTRAMUSCULAR; INTRAVENOUS EVERY 5 MIN PRN
Status: DISCONTINUED | OUTPATIENT
Start: 2020-08-07 | End: 2020-08-07 | Stop reason: HOSPADM

## 2020-08-07 RX ORDER — NEOSTIGMINE METHYLSULFATE 5 MG/5 ML
SYRINGE (ML) INTRAVENOUS PRN
Status: DISCONTINUED | OUTPATIENT
Start: 2020-08-07 | End: 2020-08-07 | Stop reason: SDUPTHER

## 2020-08-07 RX ORDER — MEPERIDINE HYDROCHLORIDE 50 MG/ML
12.5 INJECTION INTRAMUSCULAR; INTRAVENOUS; SUBCUTANEOUS EVERY 5 MIN PRN
Status: DISCONTINUED | OUTPATIENT
Start: 2020-08-07 | End: 2020-08-07 | Stop reason: HOSPADM

## 2020-08-07 RX ORDER — GLYCOPYRROLATE 1 MG/5 ML
SYRINGE (ML) INTRAVENOUS PRN
Status: DISCONTINUED | OUTPATIENT
Start: 2020-08-07 | End: 2020-08-07 | Stop reason: SDUPTHER

## 2020-08-07 RX ORDER — FENTANYL CITRATE 50 UG/ML
25 INJECTION, SOLUTION INTRAMUSCULAR; INTRAVENOUS EVERY 5 MIN PRN
Status: DISCONTINUED | OUTPATIENT
Start: 2020-08-07 | End: 2020-08-07 | Stop reason: HOSPADM

## 2020-08-07 RX ORDER — LIDOCAINE HYDROCHLORIDE 10 MG/ML
INJECTION, SOLUTION EPIDURAL; INFILTRATION; INTRACAUDAL; PERINEURAL PRN
Status: DISCONTINUED | OUTPATIENT
Start: 2020-08-07 | End: 2020-08-07 | Stop reason: SDUPTHER

## 2020-08-07 RX ORDER — PHENYLEPHRINE HYDROCHLORIDE 10 MG/ML
INJECTION INTRAVENOUS PRN
Status: DISCONTINUED | OUTPATIENT
Start: 2020-08-07 | End: 2020-08-07 | Stop reason: SDUPTHER

## 2020-08-07 RX ORDER — FENTANYL CITRATE 50 UG/ML
INJECTION, SOLUTION INTRAMUSCULAR; INTRAVENOUS PRN
Status: DISCONTINUED | OUTPATIENT
Start: 2020-08-07 | End: 2020-08-07 | Stop reason: SDUPTHER

## 2020-08-07 RX ORDER — ONDANSETRON 2 MG/ML
4 INJECTION INTRAMUSCULAR; INTRAVENOUS
Status: DISCONTINUED | OUTPATIENT
Start: 2020-08-07 | End: 2020-08-07 | Stop reason: HOSPADM

## 2020-08-07 RX ORDER — ONDANSETRON 2 MG/ML
INJECTION INTRAMUSCULAR; INTRAVENOUS PRN
Status: DISCONTINUED | OUTPATIENT
Start: 2020-08-07 | End: 2020-08-07 | Stop reason: SDUPTHER

## 2020-08-07 RX ORDER — ROCURONIUM BROMIDE 10 MG/ML
INJECTION, SOLUTION INTRAVENOUS PRN
Status: DISCONTINUED | OUTPATIENT
Start: 2020-08-07 | End: 2020-08-07 | Stop reason: SDUPTHER

## 2020-08-07 RX ORDER — PROPOFOL 10 MG/ML
INJECTION, EMULSION INTRAVENOUS PRN
Status: DISCONTINUED | OUTPATIENT
Start: 2020-08-07 | End: 2020-08-07 | Stop reason: SDUPTHER

## 2020-08-07 RX ADMIN — ONDANSETRON 4 MG: 2 INJECTION, SOLUTION INTRAMUSCULAR; INTRAVENOUS at 10:24

## 2020-08-07 RX ADMIN — FENTANYL CITRATE 50 MCG: 50 INJECTION, SOLUTION INTRAMUSCULAR; INTRAVENOUS at 10:52

## 2020-08-07 RX ADMIN — Medication 4 MG: at 10:28

## 2020-08-07 RX ADMIN — Medication 0.6 MG: at 10:28

## 2020-08-07 RX ADMIN — PROPOFOL 200 MG: 10 INJECTION, EMULSION INTRAVENOUS at 09:29

## 2020-08-07 RX ADMIN — PHENYLEPHRINE HYDROCHLORIDE 100 MCG: 10 INJECTION INTRAVENOUS at 09:53

## 2020-08-07 RX ADMIN — PHENYLEPHRINE HYDROCHLORIDE 100 MCG: 10 INJECTION INTRAVENOUS at 09:50

## 2020-08-07 RX ADMIN — LIDOCAINE HYDROCHLORIDE 50 MG: 10 INJECTION, SOLUTION EPIDURAL; INFILTRATION; INTRACAUDAL; PERINEURAL at 09:29

## 2020-08-07 RX ADMIN — ROCURONIUM BROMIDE 50 MG: 10 INJECTION INTRAVENOUS at 09:29

## 2020-08-07 RX ADMIN — Medication 10 ML: at 12:53

## 2020-08-07 RX ADMIN — PANTOPRAZOLE SODIUM 40 MG: 40 INJECTION, POWDER, FOR SOLUTION INTRAVENOUS at 12:53

## 2020-08-07 RX ADMIN — FENTANYL CITRATE 25 MCG: 50 INJECTION, SOLUTION INTRAMUSCULAR; INTRAVENOUS at 11:00

## 2020-08-07 RX ADMIN — FENTANYL CITRATE 100 MCG: 50 INJECTION INTRAMUSCULAR; INTRAVENOUS at 09:29

## 2020-08-07 ASSESSMENT — PULMONARY FUNCTION TESTS
PIF_VALUE: 22
PIF_VALUE: 17
PIF_VALUE: 23
PIF_VALUE: 2
PIF_VALUE: 1
PIF_VALUE: 15
PIF_VALUE: 23
PIF_VALUE: 1
PIF_VALUE: 19
PIF_VALUE: 15
PIF_VALUE: 21
PIF_VALUE: 22
PIF_VALUE: 21
PIF_VALUE: 21
PIF_VALUE: 22
PIF_VALUE: 22
PIF_VALUE: 19
PIF_VALUE: 25
PIF_VALUE: 21
PIF_VALUE: 16
PIF_VALUE: 1
PIF_VALUE: 23
PIF_VALUE: 16
PIF_VALUE: 27
PIF_VALUE: 17
PIF_VALUE: 20
PIF_VALUE: 23
PIF_VALUE: 21
PIF_VALUE: 21
PIF_VALUE: 22
PIF_VALUE: 22
PIF_VALUE: 15
PIF_VALUE: 25
PIF_VALUE: 22
PIF_VALUE: 21
PIF_VALUE: 1
PIF_VALUE: 23
PIF_VALUE: 16
PIF_VALUE: 20
PIF_VALUE: 22
PIF_VALUE: 1
PIF_VALUE: 21
PIF_VALUE: 1
PIF_VALUE: 23
PIF_VALUE: 24
PIF_VALUE: 2
PIF_VALUE: 22
PIF_VALUE: 24
PIF_VALUE: 22
PIF_VALUE: 23
PIF_VALUE: 29
PIF_VALUE: 21
PIF_VALUE: 32
PIF_VALUE: 22
PIF_VALUE: 22
PIF_VALUE: 0
PIF_VALUE: 21
PIF_VALUE: 16
PIF_VALUE: 22
PIF_VALUE: 1
PIF_VALUE: 0
PIF_VALUE: 22
PIF_VALUE: 25
PIF_VALUE: 4
PIF_VALUE: 21
PIF_VALUE: 0
PIF_VALUE: 22
PIF_VALUE: 22
PIF_VALUE: 23
PIF_VALUE: 15
PIF_VALUE: 21
PIF_VALUE: 12

## 2020-08-07 ASSESSMENT — PAIN SCALES - GENERAL
PAINLEVEL_OUTOF10: 7
PAINLEVEL_OUTOF10: 2
PAINLEVEL_OUTOF10: 5
PAINLEVEL_OUTOF10: 7
PAINLEVEL_OUTOF10: 3

## 2020-08-07 ASSESSMENT — PAIN DESCRIPTION - PAIN TYPE
TYPE: ACUTE PAIN
TYPE: SURGICAL PAIN

## 2020-08-07 ASSESSMENT — PAIN DESCRIPTION - LOCATION
LOCATION: ABDOMEN
LOCATION: ABDOMEN

## 2020-08-07 ASSESSMENT — PAIN - FUNCTIONAL ASSESSMENT: PAIN_FUNCTIONAL_ASSESSMENT: 0-10

## 2020-08-07 NOTE — FLOWSHEET NOTE
08/07/20 1615   Encounter Summary   Services provided to: Patient and family together   Referral/Consult From: Multi-disciplinary team;Rounding   Support System Spouse; Children   Place of Protestant   (Unknown)   Contact [place] No   Continue Visiting   (08/07/2020)   Complexity of Encounter Low   Length of Encounter 15 minutes   Spiritual Assessment Completed Yes   Routine   Type Initial   Assessment Calm; Approachable   Intervention Explored feelings, thoughts, concerns;Provided reading materials/devotional materials;Sustaining presence/ Ministry of presence   Outcome Expressed gratitude   Assessment: Patient is a 64year old male, his chart diagnosis states Cholecystitis. Intervention: Patient was talking with the nurses at the nurses station when Stacy arrived on unit. Patient returned to room and was in good spirits, he was awaiting test results to determine if he would be staying overnight in the hospital.  Patient and spouse stated that they were fine and did not need anything at the time of  visit.  provided a presence of spiritual support.  provided a copy of  [employer] devotional to family. Outcome: Patient was appreciative of the devotional and the Stacy visit.

## 2020-08-07 NOTE — ANESTHESIA PRE PROCEDURE
suppository 650 mg  650 mg Rectal Q6H PRN Mart Yfn, APRN - CNS        polyethylene glycol (GLYCOLAX) packet 17 g  17 g Oral Daily PRN Stanton Yfn, APRN - CNS        promethazine (PHENERGAN) tablet 12.5 mg  12.5 mg Oral Q6H PRN Mart Yfn, APRN - CNS        Or    ondansetron (ZOFRAN) injection 4 mg  4 mg Intravenous Q6H PRN Mart Yfn, APRN - CNS        nicotine (NICODERM CQ) 21 MG/24HR 1 patch  1 patch Transdermal Daily PRN Stanton Yfn, APRN - CNS        enoxaparin (LOVENOX) injection 40 mg  40 mg Subcutaneous Daily Stanton Yfn, APRN - CNS        0.9 % sodium chloride infusion   Intravenous Continuous RomeJUAN CARLOS Ballard  mL/hr at 08/06/20 7835         Allergies: Allergies   Allergen Reactions    Asa [Aspirin]     Chocolate Swelling       Problem List:    Patient Active Problem List   Diagnosis Code    Positive FIT (fecal immunochemical test) R19.5    Cholecystitis K81.9    Gastroesophageal reflux disease without esophagitis K21.9    Alcohol use Z72.89    Portal hypertension ([facility] [address] K76.6       Past Medical History:        Diagnosis Date    Anxiety     Chronic back pain     Depression     Gastroesophageal reflux disease without esophagitis 8/6/2020       Past Surgical History:        Procedure Laterality Date    MANDIBLE SURGERY         Social History:    Social History     Tobacco Use    Smoking status: Current Every Day Smoker     Packs/day: 1.00     Years: 20.00     Pack years: 20.00     Types: Cigarettes    Smokeless tobacco: Never Used   Substance Use Topics    Alcohol use: Yes     Alcohol/week: 5.8 standard drinks     Types: 7 Standard drinks or equivalent per week     Comment:  Occ                                Ready to quit: Not Answered  Counseling given: Not Answered      Vital Signs (Current):   Vitals:    08/06/20 1903 08/07/20 0827   BP: (!) 145/80 134/76   Pulse: 83 58   Resp: 16 16   Temp: 98.4 °F (36.9 °C) 97.6 °F (36.4 °C)   TempSrc: Oral SpO2:  97%   Weight: 195 lb (88.5 kg)    Height: 5' 9\" (1.753 m)                                               BP Readings from Last 3 Encounters:   08/07/20 134/76   08/06/20 (!) 146/86   06/22/20 (!) 143/83       NPO Status:                                                                                 BMI:   Wt Readings from Last 3 Encounters:   08/06/20 195 lb (88.5 kg)   08/06/20 195 lb (88.5 kg)   06/22/20 192 lb 9.6 oz (87.4 kg)     Body mass index is 28.8 kg/m². CBC:   Lab Results   Component Value Date    WBC 7.8 08/07/2020    RBC 4.00 08/07/2020    HGB 13.9 08/07/2020    HCT 41.8 08/07/2020    .5 08/07/2020    RDW 12.7 08/07/2020     08/07/2020       CMP:   Lab Results   Component Value Date     08/07/2020    K 4.1 08/07/2020     08/07/2020    CO2 22 08/07/2020    BUN 8 08/07/2020    CREATININE 0.52 08/07/2020    GFRAA >60 08/07/2020    LABGLOM >60 08/07/2020    GLUCOSE 105 08/07/2020    PROT 6.2 08/07/2020    CALCIUM 8.8 08/07/2020    BILITOT 4.12 08/07/2020    ALKPHOS 1,031 08/07/2020     08/07/2020     08/07/2020       POC Tests: No results for input(s): POCGLU, POCNA, POCK, POCCL, POCBUN, POCHEMO, POCHCT in the last 72 hours.     Coags:   Lab Results   Component Value Date    PROTIME 9.8 08/07/2020    INR 0.9 08/07/2020       HCG (If Applicable): No results found for: PREGTESTUR, PREGSERUM, HCG, HCGQUANT     ABGs: No results found for: PHART, PO2ART, HNW7NUI, YOK7OWT, BEART, A6ANKWAK     Type & Screen (If Applicable):  No results found for: LABABO, LABRH    Drug/Infectious Status (If Applicable):  Lab Results   Component Value Date    HEPCAB NONREACTIVE 08/04/2020       COVID-19 Screening (If Applicable):   Lab Results   Component Value Date    COVID19 Not Detected 08/06/2020         Anesthesia Evaluation  Patient summary reviewed and Nursing notes reviewed  Airway: Mallampati: III  TM distance: >3 FB   Neck ROM: full  Mouth opening: > = 3 FB Dental:    (+) upper dentures, lower dentures and edentulous      Pulmonary:Negative Pulmonary ROS and normal exam                               Cardiovascular:Negative CV ROS                      Neuro/Psych:   (+) psychiatric history:            GI/Hepatic/Renal:   (+) GERD:, liver disease: portal hypertension,           Endo/Other: Negative Endo/Other ROS                    Abdominal:           Vascular: negative vascular ROS. Anesthesia Plan      general     ASA 3       Induction: intravenous. MIPS: Postoperative opioids intended. Anesthetic plan and risks discussed with patient. Plan discussed with CRNA.                   Joanne Jett MD   8/7/2020

## 2020-08-07 NOTE — CARE COORDINATION
Case Management Initial Discharge Plan  Rhoda Kimble,             Met with:patient to discuss discharge plans. Information verified: address, contacts, phone number, , insurance Yes    Emergency Contact/Next of Kin name & number: Rafael Arcos, spouse [phone]    PCP: JUAN CARLOS Lopez CNP  Date of last visit: 3 weeks    Insurance Provider: [facility] [place]    Discharge Planning    Living Arrangements:  Spouse/Significant Other   Support Systems:  Spouse/Significant Other, Friends/Neighbors, Children    Home has 2 stories  1 stairs to climb to get into front door, N/A (pt uses first floor only)stairs to climb to reach second floor  Location of bedroom/bathroom in home 1st    Patient able to perform ADL's:Independent    Current Services (outpatient & in home) none  DME equipment: n/a  DME provider: n/a    Receiving oral anticoagulation therapy? No    If indicated:   Physician managing anticoagulation treatment: n/a  Where does patient obtain lab work for ATC treatment? n/a      Potential Assistance Needed:  N/A    Patient agreeable to home care: No  [place] of choice provided:  n/a    Prior SNF/Rehab Placement and Facility: none  Agreeable to SNF/Rehab: No  [place] of choice provided: n/a     Evaluation: no    Expected Discharge date:  20    Patient expects to be discharged to:  home  Follow Up Appointment: Best Day/ Time: Monday AM    Transportation provider: wife  Transportation arrangements needed for discharge: No    Readmission Risk              Risk of Unplanned Readmission:        7             Does patient have a readmission risk score greater than 14?: No  If yes, follow-up appointment must be made within 7 days of discharge. Goals of Care: Home with cholecystitis resolving      Discharge Plan: Home with wife who is a nurse. Declines HC needs at this time    [address] called and requested pt to be transferred to [facility].  Dr Keri Mendez, hepatobiliary surgeon, to call Dr Moy Manrique .  [facility] given Dr Umesh Barros phone number for Dr Lacie Gutierrez to call      Electronically signed by Jad Cabral RN on 8/7/20 at 12:11 PM EDT

## 2020-08-07 NOTE — PROGRESS NOTES
ERCP note reviewed - Klatskin tumor noted. Patient needs liver or surgical oncology specialist evaluation. Will need to be evaluated. Would benefit to higher level of care with mentioned surgical specialities. This was discussed with primary team.    General surgery to sign off. Please call (trauma) if there are any questions or concerns.      Electronically signed by Estela Castillo DO on 8/7/2020 at 3:12 PM

## 2020-08-07 NOTE — H&P
History:     Past Medical History:   Diagnosis Date    Anxiety     Chronic back pain     Depression     Gastroesophageal reflux disease without esophagitis 2020        Past Surgical History:     Past Surgical History:   Procedure Laterality Date    MANDIBLE SURGERY          Medications Prior to Admission:     Prior to Admission medications    Medication Sig Start Date End Date Taking? Authorizing Provider   omeprazole (PRILOSEC) 40 MG delayed release capsule Take 1 capsule by mouth every morning (before breakfast) 20  Yes Wilmer Cason, APRN - CNP   ibuprofen (ADVIL;MOTRIN) 600 MG tablet Take 1 tablet by mouth 3 times daily (with meals) for 7 days. 14 Yes Mary Ann Wei DO        Allergies:     Asa [aspirin] and Chocolate    Social History:     Tobacco:    reports that he has been smoking cigarettes. He has a 20.00 pack-year smoking history. He has never used smokeless tobacco.  Alcohol:      reports current alcohol use of about 5.8 standard drinks of alcohol per week. Drug Use:  reports no history of drug use. Family History:     Family History   Problem Relation Age of Onset    Hypertension Mother     Stroke Mother     High Blood Pressure Mother     Diabetes Father     Hypertension Father     High Blood Pressure Father     Heart Disease Father        Review of Systems:     Positive and Negative as described in HPI. Review of Systems   Constitutional: Negative. HENT: Negative. Eyes: Negative. Respiratory: Negative. Cardiovascular: Negative. Gastrointestinal: Negative. Endocrine: Negative. Genitourinary: Negative. Musculoskeletal: Negative. Skin: Negative. Neurological: Negative. Hematological: Negative. Psychiatric/Behavioral: Negative.         Physical Exam:   BP (!) 145/80   Pulse 83   Temp 98.4 °F (36.9 °C) (Oral)   Resp 16   Ht 5' 9\" (1.753 m)   Wt 195 lb (88.5 kg)   BMI 28.80 kg/m²   Temp (24hrs), Av.5 °F (36.9 °C), Min:98.4 °F (36.9 °C), Max:98.6 °F (37 °C)    No results for input(s): POCGLU in the last 72 hours. No intake or output data in the 24 hours ending 08/06/20 5046    Physical Exam  Vitals signs and nursing note reviewed. Constitutional:       General: He is not in acute distress. Appearance: Normal appearance. He is normal weight. He is not ill-appearing or toxic-appearing. HENT:      Head: Normocephalic and atraumatic. Right Ear: External ear normal.      Left Ear: External ear normal.      Nose: Nose normal. No congestion or rhinorrhea. Mouth/Throat:      Mouth: Mucous membranes are dry. Pharynx: Oropharynx is clear. No oropharyngeal exudate or posterior oropharyngeal erythema. Eyes:      Extraocular Movements: Extraocular movements intact. Conjunctiva/sclera: Conjunctivae normal.      Pupils: Pupils are equal, round, and reactive to light. Neck:      Musculoskeletal: Normal range of motion and neck supple. No neck rigidity or muscular tenderness. Cardiovascular:      Rate and Rhythm: Normal rate. Pulses: Normal pulses. Heart sounds: Normal heart sounds. No murmur. No friction rub. No gallop. Pulmonary:      Effort: Pulmonary effort is normal. No respiratory distress. Breath sounds: Normal breath sounds. No wheezing, rhonchi or rales. Abdominal:      General: Abdomen is flat. Bowel sounds are normal. There is no distension. Palpations: Abdomen is soft. There is no mass. Tenderness: There is no abdominal tenderness. Musculoskeletal: Normal range of motion. General: No swelling. Right lower leg: No edema. Left lower leg: No edema. Lymphadenopathy:      Cervical: No cervical adenopathy. Skin:     General: Skin is warm and dry. Capillary Refill: Capillary refill takes less than 2 seconds. Coloration: Skin is not jaundiced. Findings: No bruising, erythema or lesion. Neurological:      General: No focal deficit present. Mental Status: He is alert and oriented to person, place, and time. Mental status is at baseline. Cranial Nerves: No cranial nerve deficit. Sensory: No sensory deficit. Motor: No weakness. Psychiatric:         Mood and Affect: Mood normal.         Behavior: Behavior normal.         Thought Content:  Thought content normal.         Judgment: Judgment normal.         Investigations:      Laboratory Testing:  Recent Results (from the past 24 hour(s))   CBC Auto Differential    Collection Time: 08/06/20  3:13 PM   Result Value Ref Range    WBC 9.9 3.5 - 11.3 k/uL    RBC 4.00 (L) 4.21 - 5.77 m/uL    Hemoglobin 14.1 13.0 - 17.0 g/dL    Hematocrit 41.1 40.7 - 50.3 %    .8 82.6 - 102.9 fL    MCH 35.3 (H) 25.2 - 33.5 pg    MCHC 34.3 28.4 - 34.8 g/dL    RDW 12.3 11.8 - 14.4 %    Platelets 660 318 - 525 k/uL    MPV 9.2 8.1 - 13.5 fL    NRBC Automated 0.0 0.0 per 100 WBC    Differential Type NOT REPORTED     Seg Neutrophils 72 (H) 36 - 65 %    Lymphocytes 20 (L) 24 - 43 %    Monocytes 7 3 - 12 %    Eosinophils % 1 1 - 4 %    Basophils 0 0 - 2 %    Immature Granulocytes 0 0 %    Segs Absolute 7.11 1.50 - 8.10 k/uL    Absolute Lymph # 1.95 1.10 - 3.70 k/uL    Absolute Mono # 0.64 0.10 - 1.20 k/uL    Absolute Eos # 0.12 0.00 - 0.44 k/uL    Basophils Absolute 0.03 0.00 - 0.20 k/uL    Absolute Immature Granulocyte 0.03 0.00 - 0.30 k/uL    WBC Morphology NOT REPORTED     RBC Morphology NOT REPORTED     Platelet Estimate NOT REPORTED    Comprehensive Metabolic Panel w/ Reflex to MG    Collection Time: 08/06/20  3:13 PM   Result Value Ref Range    Glucose 122 (H) 70 - 99 mg/dL    BUN 12 6 - 20 mg/dL    CREATININE 0.65 (L) 0.70 - 1.20 mg/dL    Bun/Cre Ratio 18 9 - 20    Calcium 9.4 8.6 - 10.4 mg/dL    Sodium 139 135 - 144 mmol/L    Potassium 3.7 3.7 - 5.3 mmol/L    Chloride 103 98 - 107 mmol/L    CO2 25 20 - 31 mmol/L    Anion Gap 11 9 - 17 mmol/L    Alkaline Phosphatase 1,073 (H) 40 - 129 U/L     (H) 5 - 41 U/L     (H) <40 U/L    Total Bilirubin 2.76 (H) 0.3 - 1.2 mg/dL    Total Protein 6.9 6.4 - 8.3 g/dL    Alb 3.9 3.5 - 5.2 g/dL    Albumin/Globulin Ratio 1.3 1.0 - 2.5    GFR Non-African American >60 >60 mL/min    GFR African American >60 >60 mL/min    GFR Comment          GFR Staging         Lipase    Collection Time: 08/06/20  3:13 PM   Result Value Ref Range    Lipase 54 13 - 60 U/L   COVID-19, PCR    Collection Time: 08/06/20  4:44 PM    Specimen: Other   Result Value Ref Range    SARS-CoV-2          SARS-CoV-2, Rapid Not Detected Not Detected    Source . NASOPHARYNGEAL SWAB     SARS-CoV-2, PCR             Imaging/Diagnostics:  Ct Abdomen Pelvis W Iv Contrast Additional Contrast? Radiologist Recommendation    Result Date: 8/5/2020  1. Gallbladder wall thickening with cholelithiasis and increased attenuation of the gallbladder lumen. Differential includes acute cholecystitis. 2. Intrahepatic biliary dilatation with nondilated common bile duct. This may be related to the adjacent inflammatory changes within the gallbladder. Alternatively, tumor such as a Klatskin's tumor are possibilities. 3. Portal hypertension with enlarged upper abdominal varices and distal esophageal varices. Mild splenomegaly. No significant ascites. Hepatomegaly. Multiple hepatic lesions most consistent with simple cysts. 4. No acute bowel pathology. Findings were discussed with Dr. Jia Sanchez at 7:05 pm on 8/4/2020. RECOMMENDATIONS: Recommend follow-up evaluation of the gallbladder with ultrasound. Additional evaluation of the biliary tract findings can be performed with MRCP or ERCP. Us Gallbladder Ruq    Result Date: 8/6/2020  1. Gallbladder is filled with sludge/stones in addition to gallbladder wall thickening and pericholecystic fluid. Overall, findings are concerning for acute cholecystitis. However, sonographer reports a negative Logan's sign.  There is Doppler color within the gallbladder lumen, but this is likely artifactual related to mobile sludge. Possibility of an underlying isoechoic polyp or mass would be difficult to exclude. 2.  Intrahepatic and extrahepatic biliary dilatation. Common bile duct measures up to 1 cm. Additional evaluation with MRCP/ERCP is recommended. 3.  Hepatic cysts. 4.  Increased and coarsened echotexture of the liver, which can be seen with diffuse hepatocellular disease, most commonly hepatic steatosis. 5.  Isoechoic mass seen in the central right kidney is most likely a normal variant column of Guillermo. Assessment :      Hospital Problems           Last Modified POA    * (Principal) Cholecystitis 8/6/2020 Yes    Gastroesophageal reflux disease without esophagitis 8/6/2020 Yes    Alcohol use 8/6/2020 Yes    Portal hypertension ([address] 8/6/2020 Yes          Plan:     Patient status inpatient in the Med/Surge    1. Acute Cholecystitis: General Surgery consulted, continue IV fluids and prn pain control if needed, and keep NPO  2. Portal HTN and Abd/Esophageal Varices: GI consulted, NPO as above as patient will likely need ERCP tomorrow  3. Alcohol Use: monitor for s/s of withdrawal, will add CIWA if necessary  4. GERD  5. Daily labs as ordered  6. NPO  7. DVT Prophylaxis: Lovenox/ PPI prophylaxis: Protonix    Consultations:   IP CONSULT TO GENERAL SURGERY  IP CONSULT TO GI    Patient is admitted as inpatient status because of co-morbidities listed above, severity of signs and symptoms as outlined, requirement for current medical therapies and most importantly because of direct risk to patient if care not provided in a hospital setting. Expected length of stay > 48 hours.     JUAN CARLOS Fallon NP  8/6/2020  10:44 PM    Copy sent to JUAN CARLOS Streeter - CNP

## 2020-08-07 NOTE — PROGRESS NOTES
[facility] [address]    Progress Note    8/7/2020    7:57 PM    Name:   Daron Guzmán  MRN:     [id number]     Acct:      [de-identified]   Room:   [facility] [place] [address] Day:  1  Admit Date:  8/6/2020  6:42 PM    PCP:   JUAN CARLOS Perea CNP  Code Status:  Full Code    Subjective:     C/C: abd pain, abnormal CT and GB US    Interval History Status: not changed. Patient is status post ERCP, brushing of a hilar mass and stent placement in the right hepatic duct. Patient admits to 42 pound weight loss in the last several months. He does drink alcohol on a routine basis when he is not working. He also smokes tobacco.  His wife is present for family discussion. No abdominal pain, nausea or vomiting. Brief History:     Per my NP:  \"Bernabe Garza is a 64 y.o. Non-/non  male who presents with No chief complaint on file. and is admitted to the hospital for the management of Cholecystitis.     This is a 64 yr old male who presented to [facility] under the direction of his PCP. Patient reports having outpatient CT scan and GB ultrasound done which revealed acute cholecystitis, intrahepatic biliary dilation without dilation of the common bile duct, ABD and esophageal varices, and hepatic cysts. Alk. Phos, Total Bili, and liver enzymes elevated. Patient denies any ABD symptoms or complaints, denies N/V/D, reports normal bowel habits. No changes in PO intake/appetite. Patient states he had outpatient testing done as his \"wife and children were concerned about health\". The patient does endorse alcohol consumption, approx 8 beers/day. He is a , states he does not drink when 'out on the road'. Denies any history of alcohol withdrawal or seizures related to lack of alcohol. Patient is transferred to our facility for further consultation and with potential need for surgical intervention.  COVID-19 swab negative     Pertinent Labs/Dignostics: CRT: 0.65, Alk. Phos: 1,073, ALT: 214, AST: 222, Bili: 2.76\"       Review of Systems:     Constitutional:  negative for chills, fevers, sweats  Respiratory:  negative for cough, dyspnea on exertion, shortness of breath, wheezing  Cardiovascular:  negative for chest pain, chest pressure/discomfort, lower extremity edema, palpitations  Gastrointestinal:  negative for abdominal pain, constipation, diarrhea, nausea, vomiting  Neurological:  negative for dizziness, headache    Medications: Allergies: Allergies   Allergen Reactions    Asa [Aspirin]     Chocolate Swelling       Current Meds:   Scheduled Meds:    pantoprazole  40 mg Intravenous Daily    And    sodium chloride (PF)  10 mL Intravenous Daily    sodium chloride flush  10 mL Intravenous 2 times per day    enoxaparin  40 mg Subcutaneous Daily     Continuous Infusions:    sodium chloride 100 mL/hr at 08/06/20 6645     PRN Meds: sodium chloride flush, potassium chloride **OR** potassium alternative oral replacement **OR** potassium chloride, magnesium sulfate, acetaminophen **OR** acetaminophen, polyethylene glycol, promethazine **OR** ondansetron, nicotine    Data:     Past Medical History:   has a past medical history of Anxiety, Chronic back pain, Depression, Gastroesophageal reflux disease without esophagitis, and 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[facility] [address] Social History:   reports that he has been smoking cigarettes. He has a 20.00 pack-year smoking history. He has never used smokeless tobacco. He reports current alcohol use of about 5.8 standard drinks of alcohol per week. He reports that he does not use drugs.      Family History:   Family History   Problem Relation Age of Onset    Hypertension Mother     Stroke Mother     High Blood Pressure Mother     Diabetes Father     Hypertension Father     High Blood Pressure Father     Heart Disease Father        Vitals:  /74   Pulse 93   Temp 98.2 °F (36.8 °C) (Oral)   Resp 14   Ht 5' 9\" (1.753 m)   Wt 195 lb (88.5 kg)   SpO2 99%   BMI 28.80 kg/m²   Temp (24hrs), Av °F (35.6 °C), Min:93.2 °F (34 °C), Max:98.8 °F (37.1 °C)    No results for input(s): POCGLU in the last 72 hours. I/O (24Hr): Intake/Output Summary (Last 24 hours) at 2020  Last data filed at 2020 1735  Gross per 24 hour   Intake 2153 ml   Output --   Net 2153 ml       Labs:  Hematology:  Recent Labs     20  1513 20  0606   WBC 9.9 7.8   RBC 4.00* 4.00*   HGB 14.1 13.9   HCT 41.1 41.8   .8 104.5*   MCH 35.3* 34.8*   MCHC 34.3 33.3   RDW 12.3 12.7    196   MPV 9.2 9.7   INR  --  0.9     Chemistry:  Recent Labs     20  1513 20  0606    138   K 3.7 4.1    104   CO2 25 22   GLUCOSE 122* 105*   BUN 12 8   CREATININE 0.65* 0.52*   ANIONGAP 11 12   LABGLOM >60 >60   GFRAA >60 >60   CALCIUM 9.4 8.8     Recent Labs     20  1513 20  0606   PROT 6.9 6.2*   LABALBU 3.9 3.3*   * 217*   * 197*   ALKPHOS 1,073* 1,031*   BILITOT 2.76* 4.12*   BILIDIR  --  3.47*   LIPASE 54  --      ABG:No results found for: POCPH, PHART, PH, POCPCO2, TEO6ZKV, PCO2, POCPO2, PO2ART, PO2, POCHCO3, JBV6FGC, HCO3, NBEA, PBEA, BEART, BE, THGBART, THB, WIQ6JOR, XBJE4NDX, D9MGRVJS, O2SAT, FIO2  No results found for: SPECIAL  No results found for: CULTURE    Radiology:  Ct Abdomen Pelvis W Iv Contrast Additional Contrast? Radiologist Recommendation    Result Date: 2020  1. Gallbladder wall thickening with cholelithiasis and increased attenuation of the gallbladder lumen. Differential includes acute cholecystitis. 2. Intrahepatic biliary dilatation with nondilated common bile duct. This may be related to the adjacent inflammatory changes within the gallbladder. Alternatively, tumor such as a Klatskin's tumor are possibilities. 3. Portal hypertension with enlarged upper abdominal varices and distal esophageal varices. Mild splenomegaly.   No significant ascites. Hepatomegaly. Multiple hepatic lesions most consistent with simple cysts. 4. No acute bowel pathology. Findings were discussed with Dr. Francisco Sanchez at 7:05 pm on 8/4/2020. RECOMMENDATIONS: Recommend follow-up evaluation of the gallbladder with ultrasound. Additional evaluation of the biliary tract findings can be performed with MRCP or ERCP. Fl Ercp Biliary And Pancreatic S&i    Result Date: 8/7/2020  Intraoperative fluoroscopy provided for ERCP. Please refer to the procedure report for further details. Us Gallbladder Ruq    Result Date: 8/6/2020  1. Gallbladder is filled with sludge/stones in addition to gallbladder wall thickening and pericholecystic fluid. Overall, findings are concerning for acute cholecystitis. However, sonographer reports a negative Logan's sign. There is Doppler color within the gallbladder lumen, but this is likely artifactual related to mobile sludge. Possibility of an underlying isoechoic polyp or mass would be difficult to exclude. 2.  Intrahepatic and extrahepatic biliary dilatation. Common bile duct measures up to 1 cm. Additional evaluation with MRCP/ERCP is recommended. 3.  Hepatic cysts. 4.  Increased and coarsened echotexture of the liver, which can be seen with diffuse hepatocellular disease, most commonly hepatic steatosis. 5.  Isoechoic mass seen in the central right kidney is most likely a normal variant column of Guillermo.        Physical Examination:        General appearance:  alert, cooperative and no distress  Mental Status:  oriented to person, place and time and normal affect  Lungs:  clear to auscultation bilaterally, normal effort  Heart:  regular rate and rhythm, no murmur  Abdomen:  soft, nontender, nondistended, normal bowel sounds, no masses, hepatomegaly, splenomegaly  Extremities:  no edema, redness, tenderness in the calves  Skin:  no gross lesions, rashes, induration    Assessment:        Hospital Problems           Last Modified POA    * (Principal) Intrahepatic bile duct dilation 8/7/2020 Yes    Cholecystitis 8/7/2020 Yes    Gastroesophageal reflux disease without esophagitis 8/6/2020 Yes    Alcohol use 8/6/2020 Yes    Portal hypertension ([facility] [address] 8/6/2020 Yes    Esophageal varices without bleeding ([facility] [address] 8/7/2020 Yes    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[facility] [address] 8/7/2020 Yes    Elevated bilirubin 8/7/2020 Yes    Elevated CA 19-9 level 8/7/2020 Yes    Alcohol abuse 8/7/2020 Yes          Plan:        1. Intrahepatic bile duct dilatation status post ERCP with stent of right hepatic duct  2. Hilar mass suggestive of Klatskin's tumor-await pathology, CA-19-9 elevated, discussed with general surgery who recommends transfer to Center with hepatobiliary surgeon-discussed with Dr. Prabhjot Zuniga. Will obtain MRCP and repeat LFTs to monitor bilirubin level. Recommend outpatient follow-up and PET scan for staging  3. Alcohol abuse- start IV thiamine folate, CIWA protocol monitor for withdrawal  4. GERD-PPI  5. EPC cuffs for DVT prophylaxis  6. Tobacco use-nicotine patch    Family meeting occurred with patient and wife. I discussed the anatomy of the hepatobiliary system and the location of the mass. I explained that pathology is pending, but malignancy is suspected. General surgery is recommending transfer to [facility] to see Dr. Prabhjot Zuniga, a hepatobiliary surgeon. She states that patient follow-up outpatient in her office will call him. I encouraged the patient to think positively since there are treatment options such as surgical resection and adjuvant therapy.     Plan for discharge tomorrow after MRCP and Nicky Acosta MD  8/7/2020  7:57 PM

## 2020-08-07 NOTE — OP NOTE
ERCP      Patient:   Alex Bear   :    1960  Acc#:    [id number]   Referring/PCP: JUAN CARLOS Guy CNP  Facility:   [facility]  Date:     2020   Endoscopist:  Tato Nina MD, [facility]    Procedure: ERCP with  cholangiogram, brushing of the hilar mass, stent placement        Indication: Abnormal liver chemistries, abnormal CT of the abdomen    Postprocedure diagnosis: Hilar mass suggestive of Klatskin tumor, stented in the right hepatic duct. Anesthesia:  General     EBL: None from the procedure    Specimen: Bile duct brushings, sent to the lab. Description of Procedure:  Prior to the procedure, a history and physical exam was performed and informed consent was obtained. The risks were discussed including pancreatitis, bleeding, and perforation. After the patient was placed in the prone position eved, the therapeutic duodenoscope scope was inserted into the mouth and advanced to the second portion of the duodenum allowing the papilla to be visualized. Using the a wire guided approach with the sphincterotome, the CBD was cannulated and a cholangiogram was performed. Findings: The initial cholangiogram revealed normal common bile duct with 2 cm mass at the bifurcation. Only the right hepatic duct system was opacified. The right intrahepatic ducts were dilated above the mass. A 5 sphincterotomy was performed. The sphincterotome was exchanged, over a wire with cytology brush and brushings were obtained from the bile duct mass. Attempts were made to cannulate the left hepatic duct system with angle wire as well as straight wire but were unsuccessful. Left hepatic system was neither cannulated nor opacified. Then 10 [place] 9 cm plastic straight stent was inserted into the right hepatic duct above the tumor. The stent was in excellent position both endoscopically and fluoroscopically. Bile started to flow through the stent.     The duodenoscope was removed and the patient tolerated the procedure well. The pancreatic duct was not manipulated during the procedure. Plan: Check CEA and CA-19-9 levels. On review of CT, there are lymph node like structure around the bile duct likely metastatic lymph nodes. Patient will benefit from EUS as an outpatient for lymph node biopsy for staging. If lymph node biopsies are positive for malignancy then he would not be a transplant candidate, and in that case uncovered metal stent can be placed for palliative purposes.     Electronically signed by Mariely Gould MD, FACG on 8/7/2020 at 10:57 AM

## 2020-08-07 NOTE — CONSULTS
THE [facility]  Consultation Note     . REASON FOR CONSULTATION:    CT abdomen pelvis showing intrahepatic biliary dilation with abdominal esophageal varices. HISTORY OF PRESENT ILLNESS:      This is a 64 yr old male who presented to [facility] under the direction of his PCP. Patient reports having outpatient CT scan and GB ultrasound done which revealed acute cholecystitis, intrahepatic biliary dilation without dilation of the common bile duct, ABD and esophageal varices, and hepatic cysts. Alk. Phos, Total Bili, and liver enzymes elevated with cholestatic pattern. He visited hospital after family was insisting him to visit hospital because they were concerned for patient's health. He reported having weight loss of 42 pounds in the last 3 months without any change in appetite. He also states that 2 weeks ago he had upper abdominal pain, 5/10, that went away after 5 hours on its own. He reported that for the last 2 months people telling him that his eyes are a bit yellow. Currently, he denies anorexia, vomiting, diarrhea, constipation or abdominal pain but does describe that he has feeling of some pressure in the RUQ. He denied any history of GI surgery or GI illness in the past including Crohn, ulcerative colitis or IBS. He denied any family history of colonic or liver carcinoma. Reports that he takes Prilosec at home for his heartburn. He drinks 6-7 drinks per day 4-5 times a week. He smokes 1 pack/day for the last 30-40 years. Denies any illicit drug use  Denies use of NSAIDs  COVID-19 swab negative  Hepatitis B and C nonreactive.     Pertinent Labs/Dignostics: CRT: 0.65, Alk. Phos: 1,073, ALT: 214, AST: 222, Bili: 2.76    Summary of imaging completed at this time:    CT abdomen Pelvis W IV contrast>  1. Gallbladder wall thickening with cholelithiasis and increased attenuation    of the gallbladder lumen.  Differential includes acute cholecystitis.     2. Intrahepatic biliary dilatation with nondilated common bile duct.  This    may be related to the adjacent inflammatory changes within the gallbladder. Alternatively, tumor such as a Klatskin's tumor are possibilities. 3. Portal hypertension with enlarged upper abdominal varices and distal    esophageal varices.  Mild splenomegaly.  No significant ascites. Hepatomegaly.  Multiple hepatic lesions most consistent with simple cysts. US RUQ>  1.  Gallbladder is filled with sludge/stones in addition to gallbladder wall    thickening and pericholecystic fluid.  Overall, findings are concerning for    acute cholecystitis.  However, sonographer reports a negative Logan's sign. There is Doppler color within the gallbladder lumen, but this is likely    artifactual related to mobile sludge.  Possibility of an underlying isoechoic    polyp or mass would be difficult to exclude.         2.  Intrahepatic and extrahepatic biliary dilatation.  Common bile duct    measures up to 1 cm.  Additional evaluation with MRCP/ERCP is recommended.         3.  Hepatic cysts.         4.  Increased and coarsened echotexture of the liver, which can be seen with    diffuse hepatocellular disease, most commonly hepatic steatosis. Summary of labs completed at this time:  Cholestatic LTSs pattern    On physician exam:  Soft abdomen with mild epigastric tenderness and feeling of a mass in epigastric/RUQ. Logan's sign negative. No hepatosplenomegaly. BS +ve.      Previous GI history:   None significant other than heartburn for which he takes Prilosec    Past Medical/Social/Family History:  Past Medical History:   Diagnosis Date    Anxiety     Chronic back pain     Depression     Gastroesophageal reflux disease without esophagitis 8/6/2020     Past Surgical History:   Procedure Laterality Date    MANDIBLE SURGERY       Family History   Problem Relation Age of Onset    Hypertension Mother     Stroke Mother     High Blood Pressure (88.5 kg)   BMI 28.80 kg/m²   . TMAX[24]    General: Well developed, Well nourished, No apparent distress  Head:  Normocephalic, Atraumatic  EENT: EOMI, Sclera not icteric, Oropharynx moist  Neck:  Supple, Trachea midline  Lungs:CTA Bilaterally  Heart: RRR, No murmur, No rub, No gallop, PMI nondisplaced. Abdomen:Soft, Non tender, Not distended, BS WNL,  No masses. No hepatomegalia   Ext:No clubbing. No cyanosis. No edema. Skin: No rashes. No jaundice. No stigmata of liver disease. Neuro:  A&O x Three, No focal neurological deficits    Labs and Imaging:     Hemotological labs: Anemia studies:  No results for input(s): LABIRON, TIBC, FERRITIN, [id number], FOLATE, OCCULTBLD in the last 72 hours. CBC:  Recent Labs     08/06/20  1513 08/07/20  0606   WBC 9.9 7.8   HGB 14.1 13.9   .8 104.5*   RDW 12.3 12.7    196       PT/INR:  Recent Labs     08/07/20  0606   PROTIME 9.8   INR 0.9       BMP:  Recent Labs     08/06/20  1513 08/07/20  0606    138   K 3.7 4.1    104   CO2 25 22   BUN 12 8   CREATININE 0.65* 0.52*   GLUCOSE 122* 105*   CALCIUM 9.4 8.8       Liver work up:  Hepatitis Functional Panel:  Recent Labs     08/07/20  0606   ALKPHOS 1,031*   *   *   PROT 6.2*   BILITOT 4.12*   BILIDIR 3.47*   LABALBU 3.3*       Amylase/Lipase/Ammonia:  Recent Labs     08/06/20  1513   LIPASE 54       Acute Hepatitis Panel:  Lab Results   Component Value Date    HEPBSAG NONREACTIVE 08/04/2020    HEPCAB NONREACTIVE 08/04/2020    HEPBIGM NONREACTIVE 08/04/2020    HEPAIGM NONREACTIVE 08/04/2020       Cancer Markers:  CEA:    No results for input(s): CEA in the last 72 hours. Ca 125:   No results for input(s):  in the last 72 hours. Ca 19-9:     Invalid input(s):   AFP: No results for input(s): AFP in the last 72 hours.      Principal Problem:    Cholecystitis  Active Problems:    Gastroesophageal reflux disease without esophagitis    Alcohol use    Portal hypertension [facility])  Resolved Problems:    * No resolved hospital problems.  *       Assessment and plan of care:     Assessment:  -Acute cholecystitis without any sepsis  -Cholelithiasis  -Dilated intra-and extrahepatic biliary ducts on CT  -Cholestatic LTFs pattern  -PH with Abdominal and esophageal varices on CT      Plan:  -Keep n.p.o.  -Plan of ERCP today  -If ERCP doesn't retrieve stone then will have MRI abdomen with MRCP  -May need to start on beta-blocker for variceal bleed prophylaxis  -May need antibiotic for acute cholecystitis if clinically evident and becomes septic  -Will get gallbladder/pancreatic carcinoma markers  -Will need outpatient colonoscopy    This plan was formulated in collaboration with Dr. Guadalupe Berkowitz    Electronically signed by:  Morena Holt MD  RESIDENT, PARVIZ Rucker 11  8/7/2020,8:06 AM

## 2020-08-07 NOTE — CONSULTS
General Surgery:  Consult Note        PATIENT NAME: Ernesto Frost   YOB: 1960    ADMISSION DATE: 8/6/2020  6:42 PM     Admitting Provider: Konrad Beck Physician: Sukhjinder Deluca DATE: 8/7/2020    Chief Complaint: Right upper quadrant pain  Consult Regarding: Choledocholithiasis vs cholelithiasis. HISTORY OF PRESENT ILLNESS:  The patient is a 64 y.o. male  who was admitted on 8/6. Patient states that he began having right upper quadrant pain when eating greasy or fried foods approximately 2 months ago. Patient states that the pain was a waxing waning pain that would come and spread across his abdomen and only took place when he was eating foods high in fat content. Patient had a CT scan done outpatient. Results were concerning for possible cholecystitis and patient was encouraged to come into [facility] stat a transfer can be made to [address] [place] [address]. [facility]. Patient states that he has not had much pain and that the real discomfort is only happened about 3 times. He denies any chest pain, cough, shortness of breath, sore throat, fevers, chills, changes in mentation or changes in bowel habitus. Past Medical History:        Diagnosis Date    Anxiety     Chronic back pain     Depression     Gastroesophageal reflux disease without esophagitis 8/6/2020       Past Surgical History:        Procedure Laterality Date    MANDIBLE SURGERY         Medications Prior to Admission:   Medications Prior to Admission: omeprazole (PRILOSEC) 40 MG delayed release capsule, Take 1 capsule by mouth every morning (before breakfast)  ibuprofen (ADVIL;MOTRIN) 600 MG tablet, Take 1 tablet by mouth 3 times daily (with meals) for 7 days.     Allergies:  Asa [aspirin] and Chocolate    Social History:   Social History     Socioeconomic History    Marital status:      Spouse name: Faustino Armas    Number of children: 2    Years of education: 12    Highest education level: Not on file   Occupational History    Occupation:    Social Needs    Financial resource strain: Not hard at all   [place] insecurity     Worry: Never true     Inability: Never true   [employer] needs     Medical: No     Non-medical: No   Tobacco Use    Smoking status: Current Every Day Smoker     Packs/day: 1.00     Years: 20.00     Pack years: 20.00     Types: Cigarettes    Smokeless tobacco: Never Used   Substance and Sexual Activity    Alcohol use: Yes     Alcohol/week: 5.8 standard drinks     Types: 7 Standard drinks or equivalent per week     Comment: Occ    Drug use: No    Sexual activity: Yes   Lifestyle    Physical activity     Days per week: Not on file     Minutes per session: Not on file    Stress: Not on file   Relationships    Social connections     Talks on phone: Not on file     Gets together: Not on file     Attends Yarsanism service: Not on file     Active member of club or organization: Not on file     Attends meetings of clubs or organizations: Not on file     Relationship status: Not on file    Intimate partner violence     Fear of current or ex partner: Not on file     Emotionally abused: Not on file     Physically abused: Not on file     Forced sexual activity: Not on file   Other Topics Concern    Not on file   Social History Narrative    Not on file       Family History:       Problem Relation Age of Onset    Hypertension Mother     Stroke Mother     High Blood Pressure Mother     Diabetes Father     Hypertension Father     High Blood Pressure Father     Heart Disease Father        REVIEW OF SYSTEMS:    CONSTITUTIONAL: Denies recent weight loss, fatigue, fevers, chills. HEENT: Denies rhinorrhea, dysphagia, odynphagia. CARDIOVASCULAR: Denies history of MI, recent chest pain. RESPIRATORY: Denies recent history of shortness of breath or history of PE.   GASTROINTESTINAL: Right upper quadrant pain consistent with possible cholelithiasis  GENITOURINARY: Denies increased frequency or dysuria. HEMATOLOGIC/LYMPHATIC: Denies history of anemia or DVTs. ENDOCRINE: Denies history of thyroid problems or diabetes. NEURO: Denies history of CVA, TIA. Review of systems negative unless listed above. PHYSICAL EXAM:    VITALS:  BP (!) 145/80   Pulse 83   Temp 98.4 °F (36.9 °C) (Oral)   Resp 16   Ht 5' 9\" (1.753 m)   Wt 195 lb (88.5 kg)   BMI 28.80 kg/m²   INTAKE/OUTPUT:   No intake or output data in the 24 hours ending 08/07/20 0008    CONSTITUTIONAL:  awake, alert, not distressed and normal weight  HEENT: Normocephalic/atraumatic, without obvious abnormality. NECK:  Supple, symmetrical, trachea midline   CARDIOVASCULAR: Regular rate and rhythm without murmurs. LUNGS: Clear to auscultation bilaterally without evidence of wheezing or tachypnea. ABDOMEN: Soft nontender, minor discomfort to palpation in right upper quadrant. MUSCULOSKELETAL: Muscle strength intact in all extremities bilaterally. NEUROLOGIC: CN II- XII intact. Gross motor intact without focal weakness. SKIN: No cyanosis, rashes, or edema noted.    Orientation:   oriented to person, place, and time    IV Access: Antecubitals  Morales: No Mroales    CBC:   Lab Results   Component Value Date    WBC 9.9 08/06/2020    RBC 4.00 08/06/2020    HGB 14.1 08/06/2020    HCT 41.1 08/06/2020    .8 08/06/2020    MCH 35.3 08/06/2020    MCHC 34.3 08/06/2020    RDW 12.3 08/06/2020     08/06/2020    MPV 9.2 08/06/2020     BMP:    Lab Results   Component Value Date     08/06/2020    K 3.7 08/06/2020     08/06/2020    CO2 25 08/06/2020    BUN 12 08/06/2020    LABALBU 3.9 08/06/2020    CREATININE 0.65 08/06/2020    CALCIUM 9.4 08/06/2020    GFRAA >60 08/06/2020    LABGLOM >60 08/06/2020    GLUCOSE 122 08/06/2020     AMYLASE:  No results found for: AMYLASE  LIPASE:    Lab Results   Component Value Date    LIPASE 54 08/06/2020       Pertinent Radiology:   Us Gallbladder Ruq    Result Date: 8/6/2020  EXAMINATION: RIGHT UPPER commonly hepatic steatosis. 5.  Isoechoic mass seen in the central right kidney is most likely a normal variant column of Guillermo. ASSESSMENT:  Active Hospital Problems    Diagnosis Date Noted    Cholecystitis [K81.9] 08/06/2020    Gastroesophageal reflux disease without esophagitis [K21.9] 08/06/2020    Alcohol use [Z72.89] 08/06/2020    Portal hypertension ([address] [K76.6] 08/06/2020       1. Choledocholithiasis versus cholecystitis    Plan:  1. Continue medical mgmt and supportive care per primary  2. GI consult  3. MRCP tomorrow  4. N.p.o.  5. Repeat morning lab  6. Repeat morning labs  7. Amylase and lipase      Electronically signed by Leatha Brown MD  on 8/7/2020 at 12:08 AM     Attending Note      I have reviewed the above [facility] note(s) and I either performed the key elements of the medical history and physical exam or was present with the surgery resident when the key elements of the medical history and physical exam were performed. I have discussed the findings, established the care plan and recommendations with the surgical team.  Scan as well as U/S reviewed. Minimal pain, tolerating po. WBC normal.  Significant etoh hx as well as portal hptn with varices. Rec GI eval possible mrcp/ercp. May also consider HIDA. Cholecystectomy carries significant operative risk in this patient with portal hptn.     Erin Whitehead MD  8/7/2020  7:16 AM

## 2020-08-07 NOTE — ANESTHESIA POSTPROCEDURE EVALUATION
Department of Anesthesiology  Postprocedure Note    Patient: Danniel Denver  MRN: [id number]  YOB: 1960  Date of evaluation: 8/7/2020  Time:  3:31 PM     Procedure Summary     Date:  08/07/20 Room / Location:  [facility] / [address] [place] [address]    Anesthesia Start:  7423 Anesthesia Stop:  9217    Procedures:       ERCP ENDOSCOPIC RETROGRADE CHOLANGIOPANCREATOGRAPHY WITH BRUSHINGS (N/A )      ERCP STENT INSERTION Diagnosis:  (ABNORMAL MRI)    Surgeon:  Santos Sharpe MD Responsible Provider:  Cyrus Quinonez MD    Anesthesia Type:  general ASA Status:  3          Anesthesia Type: general    Roma Phase I: Roma Score: 10    Roma Phase II:      Last vitals: Reviewed and per EMR flowsheets.    POST-OP ANESTHESIA NOTE       BP 95/64   Pulse 63   Temp 97.6 °F (36.4 °C)   Resp 16   Ht 5' 9\" (1.753 m)   Wt 195 lb (88.5 kg)   SpO2 94%   BMI 28.80 kg/m²    Pain Assessment: 0-10  Pain Level: 3         Anesthesia Post Evaluation    Patient location during evaluation: PACU  Patient participation: complete - patient participated  Level of consciousness: awake  Pain score: 3  Airway patency: patent  Nausea & Vomiting: no nausea and no vomiting  Complications: no  Cardiovascular status: hemodynamically stable  Respiratory status: acceptable  Hydration status: stable

## 2020-08-07 NOTE — PROGRESS NOTES
Comprehensive Nutrition Assessment    Type and Reason for Visit:  Initial, Positive Nutrition Screen(wt loss)    Nutrition Recommendations/Plan:   - Continue current cardiac diet. - Start standard Ensure ONS BID.  - Monitor labs, weight, and PO/supplement intake. Nutrition Assessment:  Pt admitted d/t to abdominal pain. Screened for wt loss. RN and EMR review indicated 43 lb wt loss in 3 months. Pt had a hepatic duct stent placed this morning. Pt was sleeping at time of visit. RN reports pt ate 100% of his lunch and has a great appetite. Malnutrition Assessment:  Malnutrition Status: At risk for malnutrition (Comment)(-43 lb (18% wt loss in 3 months))    Context:  Acute Illness     Findings of the 6 clinical characteristics of malnutrition:  Energy Intake:  No significant decrease in energy intake  Weight Loss:  7 - Greater than 7.5% over 3 months     Body Fat Loss:  Unable to assess     Muscle Mass Loss:  Unable to assess    Fluid Accumulation:  No significant fluid accumulation     Strength:  Not Performed    Estimated Daily Nutrient Needs:  Energy (kcal):  5780-8609 kcal/day; Weight Used for Energy Requirements:  Current     Protein (g):  109 g/day; Weight Used for Protein Requirements:  Ideal(1.5 g/kg)          Nutrition Related Findings:  Labs reviewed: Cr 0.52. Meds reviewed. Wounds:  None       Current Nutrition Therapies:    DIET CARDIAC; Anthropometric Measures:  · Height: 5' 9\" (175.3 cm)  · Current Body Weight: 195 lb (88.5 kg)   · Ideal Body Weight: 160 lbs; % Ideal Body Weight     · BMI: 28.8   · BMI Categories: Overweight (BMI 25.0-29. 9)       Nutrition Diagnosis:   · Inadequate oral intake related to (current medical condition) as evidenced by weight loss(pt reported -43 lbs in 3 months)      Nutrition Interventions:   Food and/or Nutrient Delivery:  Continue Current Diet, Start Oral Nutrition Supplement  Nutrition Education/Counseling:  No recommendation at this time Coordination of Nutrition Care:  Continued Inpatient Monitoring    Goals:  Meet greater than 75% of recommended nutritional intake       Nutrition Monitoring and Evaluation:   Behavioral-Environmental Outcomes: Other (Comment)(N/A)   Food/Nutrient Intake Outcomes:  Food and Nutrient Intake, Supplement Intake  Physical Signs/Symptoms Outcomes:  Biochemical Data, Nutrition Focused Physical Findings, Weight     Discharge Planning:     Too soon to determine     Electronically signed by Emiliana Brown on 8/7/20 at 1:48 PM EDT    Contact: [phone]

## 2020-08-07 NOTE — PROGRESS NOTES
Occupational Therapy Not Seen Note    DATE: 2020  Name: Kostas Peña  : 1960  MRN: [id number]    Patient not available for Occupational Therapy due to:    Surgery/Procedure: Per RN, pt plan for OR today for ERCP with  cholangiogram, brushing of the hilar mass, stent placement.     Next Scheduled Treatment: Recheck 2020    Electronically signed by NATANAEL Mccormack on 2020 at 8:58 AM

## 2020-08-08 ENCOUNTER — APPOINTMENT (OUTPATIENT)
Dept: MRI IMAGING | Age: 60
DRG: 445 | End: 2020-08-08
Attending: INTERNAL MEDICINE
Payer: COMMERCIAL

## 2020-08-08 LAB
ALBUMIN SERPL-MCNC: 3.4 G/DL (ref 3.5–5.2)
ALBUMIN/GLOBULIN RATIO: 1.2 (ref 1–2.5)
ALP BLD-CCNC: 970 U/L (ref 40–129)
ALT SERPL-CCNC: 190 U/L (ref 5–41)
ANION GAP SERPL CALCULATED.3IONS-SCNC: 12 MMOL/L (ref 9–17)
AST SERPL-CCNC: 188 U/L
BILIRUB SERPL-MCNC: 6.2 MG/DL (ref 0.3–1.2)
BUN BLDV-MCNC: 7 MG/DL (ref 6–20)
BUN/CREAT BLD: ABNORMAL (ref 9–20)
CALCIUM SERPL-MCNC: 8.6 MG/DL (ref 8.6–10.4)
CHLORIDE BLD-SCNC: 104 MMOL/L (ref 98–107)
CO2: 23 MMOL/L (ref 20–31)
CREAT SERPL-MCNC: 0.45 MG/DL (ref 0.7–1.2)
GFR AFRICAN AMERICAN: >60 ML/MIN
GFR NON-AFRICAN AMERICAN: >60 ML/MIN
GFR SERPL CREATININE-BSD FRML MDRD: ABNORMAL ML/MIN/{1.73_M2}
GFR SERPL CREATININE-BSD FRML MDRD: ABNORMAL ML/MIN/{1.73_M2}
GLUCOSE BLD-MCNC: 114 MG/DL (ref 70–99)
POTASSIUM SERPL-SCNC: 4.1 MMOL/L (ref 3.7–5.3)
SODIUM BLD-SCNC: 139 MMOL/L (ref 135–144)
TOTAL PROTEIN: 6.2 G/DL (ref 6.4–8.3)

## 2020-08-08 PROCEDURE — 99239 HOSP IP/OBS DSCHRG MGMT >30: CPT | Performed by: INTERNAL MEDICINE

## 2020-08-08 PROCEDURE — 6360000002 HC RX W HCPCS: Performed by: INTERNAL MEDICINE

## 2020-08-08 PROCEDURE — C9113 INJ PANTOPRAZOLE SODIUM, VIA: HCPCS | Performed by: INTERNAL MEDICINE

## 2020-08-08 PROCEDURE — 6370000000 HC RX 637 (ALT 250 FOR IP): Performed by: INTERNAL MEDICINE

## 2020-08-08 PROCEDURE — 1200000000 HC SEMI PRIVATE

## 2020-08-08 PROCEDURE — 80053 COMPREHEN METABOLIC PANEL: CPT

## 2020-08-08 PROCEDURE — 6360000004 HC RX CONTRAST MEDICATION: Performed by: INTERNAL MEDICINE

## 2020-08-08 PROCEDURE — 99232 SBSQ HOSP IP/OBS MODERATE 35: CPT | Performed by: INTERNAL MEDICINE

## 2020-08-08 PROCEDURE — 2580000003 HC RX 258: Performed by: INTERNAL MEDICINE

## 2020-08-08 PROCEDURE — A9579 GAD-BASE MR CONTRAST NOS,1ML: HCPCS | Performed by: INTERNAL MEDICINE

## 2020-08-08 PROCEDURE — 74183 MRI ABD W/O CNTR FLWD CNTR: CPT

## 2020-08-08 PROCEDURE — 36415 COLL VENOUS BLD VENIPUNCTURE: CPT

## 2020-08-08 RX ORDER — FOLIC ACID 1 MG/1
1 TABLET ORAL DAILY
Status: DISCONTINUED | OUTPATIENT
Start: 2020-08-08 | End: 2020-08-09 | Stop reason: HOSPADM

## 2020-08-08 RX ORDER — THIAMINE MONONITRATE (VIT B1) 100 MG
100 TABLET ORAL DAILY
Status: DISCONTINUED | OUTPATIENT
Start: 2020-08-08 | End: 2020-08-09 | Stop reason: HOSPADM

## 2020-08-08 RX ORDER — OXYCODONE HYDROCHLORIDE 5 MG/1
5 TABLET ORAL EVERY 4 HOURS PRN
Status: DISCONTINUED | OUTPATIENT
Start: 2020-08-08 | End: 2020-08-09 | Stop reason: HOSPADM

## 2020-08-08 RX ORDER — PROMETHAZINE HYDROCHLORIDE 12.5 MG/1
12.5 TABLET ORAL EVERY 6 HOURS PRN
DISCHARGE
Start: 2020-08-08 | End: 2020-08-15

## 2020-08-08 RX ORDER — FOLIC ACID 1 MG/1
1 TABLET ORAL DAILY
Qty: 30 TABLET | Refills: 3 | DISCHARGE
Start: 2020-08-09 | End: 2020-09-17 | Stop reason: ALTCHOICE

## 2020-08-08 RX ORDER — SODIUM CHLORIDE 0.9 % (FLUSH) 0.9 %
10 SYRINGE (ML) INJECTION 2 TIMES DAILY
Status: DISCONTINUED | OUTPATIENT
Start: 2020-08-08 | End: 2020-08-09 | Stop reason: HOSPADM

## 2020-08-08 RX ORDER — OXYCODONE HYDROCHLORIDE 5 MG/1
5 TABLET ORAL EVERY 4 HOURS PRN
Refills: 0 | Status: SHIPPED | OUTPATIENT
Start: 2020-08-08 | End: 2020-08-11

## 2020-08-08 RX ORDER — OXYCODONE HYDROCHLORIDE 5 MG/1
10 TABLET ORAL EVERY 4 HOURS PRN
Status: DISCONTINUED | OUTPATIENT
Start: 2020-08-08 | End: 2020-08-09 | Stop reason: HOSPADM

## 2020-08-08 RX ORDER — LORAZEPAM 1 MG/1
1 TABLET ORAL EVERY 6 HOURS PRN
Status: SHIPPED | OUTPATIENT
Start: 2020-08-08 | End: 2020-09-07

## 2020-08-08 RX ORDER — LANOLIN ALCOHOL/MO/W.PET/CERES
100 CREAM (GRAM) TOPICAL DAILY
Qty: 30 TABLET | Refills: 3 | DISCHARGE
Start: 2020-08-09 | End: 2020-09-17 | Stop reason: ALTCHOICE

## 2020-08-08 RX ORDER — POLYETHYLENE GLYCOL 3350 17 G/17G
17 POWDER, FOR SOLUTION ORAL DAILY PRN
Qty: 527 G | Refills: 1 | DISCHARGE
Start: 2020-08-08 | End: 2020-09-07

## 2020-08-08 RX ORDER — NICOTINE 21 MG/24HR
1 PATCH, TRANSDERMAL 24 HOURS TRANSDERMAL DAILY PRN
Qty: 30 PATCH | Refills: 3 | DISCHARGE
Start: 2020-08-08 | End: 2021-03-24

## 2020-08-08 RX ADMIN — OXYCODONE HYDROCHLORIDE 5 MG: 5 TABLET ORAL at 22:42

## 2020-08-08 RX ADMIN — FOLIC ACID 1 MG: 1 TABLET ORAL at 14:17

## 2020-08-08 RX ADMIN — ENOXAPARIN SODIUM 40 MG: 40 INJECTION SUBCUTANEOUS at 10:11

## 2020-08-08 RX ADMIN — OXYCODONE HYDROCHLORIDE 5 MG: 5 TABLET ORAL at 14:18

## 2020-08-08 RX ADMIN — GADOTERIDOL 17 ML: 279.3 INJECTION, SOLUTION INTRAVENOUS at 09:40

## 2020-08-08 RX ADMIN — SODIUM CHLORIDE: 9 INJECTION, SOLUTION INTRAVENOUS at 22:27

## 2020-08-08 RX ADMIN — Medication 100 MG: at 14:17

## 2020-08-08 RX ADMIN — OXYCODONE HYDROCHLORIDE 5 MG: 5 TABLET ORAL at 18:24

## 2020-08-08 RX ADMIN — Medication 10 ML: at 09:50

## 2020-08-08 RX ADMIN — PANTOPRAZOLE SODIUM 40 MG: 40 INJECTION, POWDER, FOR SOLUTION INTRAVENOUS at 09:50

## 2020-08-08 ASSESSMENT — PAIN SCALES - GENERAL
PAINLEVEL_OUTOF10: 6
PAINLEVEL_OUTOF10: 6
PAINLEVEL_OUTOF10: 5

## 2020-08-08 NOTE — PROGRESS NOTES
THE [facility] [place] [facility] [place] [facility]   Progress Note    Alyson Goyal is a 64 y.o. male patient. Hospitalization Day:2      Chief consult reason:   CT abdomen pelvis showing intrahepatic biliary dilation with abdominal esophageal varices. Subjective:  Patient not in the room, went down for MRI abdomen. s/p ERCP  for intrahepatic biliary dilation on CT abdomen> 2 cm mass at the bifurcation of CBD with right hepatic duct system opacification and a stent was inserted into the right hepatic duct about the tumor. Left hepatic duct system cannulation was unsuccessful. Cytology brushing was obtained from the hilar mass. CA-19-9 elevated    VITALS:  /74   Pulse 93   Temp 98.2 °F (36.8 °C) (Oral)   Resp 14   Ht 5' 9\" (1.753 m)   Wt 195 lb (88.5 kg)   SpO2 99%   BMI 28.80 kg/m²   TEMPERATURE:  Current - Temp: 98.2 °F (36.8 °C); Max - Temp  Av °F (35.6 °C)  Min: 93.2 °F (34 °C)  Max: 98.8 °F (37.1 °C)    Physical Assessment:  General appearance:  alert, cooperative and no distress  Mental Status:  oriented to person, place and time and normal affect  Lungs:  clear to auscultation bilaterally, normal effort  Heart:  regular rate and rhythm, no murmur  Abdomen:  soft, nontender, nondistended, normal bowel sounds, no masses, hepatomegaly, splenomegaly  Extremities:  no edema, redness, tenderness in the calves  Skin:  no gross lesions, rashes, induration    Data Review:    Labs and Imaging:     CBC:  Recent Labs     20  1513 20  0606   WBC 9.9 7.8   HGB 14.1 13.9   .8 104.5*   RDW 12.3 12.7    196       ANEMIA STUDIES:  No results for input(s): LABIRON, TIBC, FERRITIN, DBPHANES41, FOLATE, OCCULTBLD in the last 72 hours.     BMP:  Recent Labs     20  1513 20  0606 20  0610    138 139   K 3.7 4.1 4.1    104 104   CO2 25 22 23   BUN 12 8 7   CREATININE 0.65* 0.52* 0.45*   GLUCOSE 122* 105* 114*   CALCIUM 9.4 8.8 8.6       LFTS:  Recent Labs     20  3625 08/07/20  0606 08/08/20  0610   ALKPHOS 1,073* 1,031* 970*   * 197* 190*   * 217* 188*   BILITOT 2.76* 4.12* 6.20*   BILIDIR  --  3.47*  --    LABALBU 3.9 3.3* 3.4*       Amylase/Lipase and Ammonia:  Recent Labs     08/06/20  1513   LIPASE 54       Acute Hepatitis Panel:  Lab Results   Component Value Date    HEPBSAG NONREACTIVE 08/04/2020    HEPCAB NONREACTIVE 08/04/2020    HEPBIGM NONREACTIVE 08/04/2020    HEPAIGM NONREACTIVE 08/04/2020       HCV Genotype:  No results found for: HEPATITISCGENOTYPE    HCV Quantitative:  No results found for: HCVQNT    LIVER WORK UP:    AFP  No results found for: AFP    Alpha 1 antitrypsin   No results found for: A1A    Anti - Liver/Kidney Ab  No results found for: LIVER-KIDNEYMICROSOMALAB    SUYAPA  No results found for: SUYAPA    AMA  No results found for: Horald Setter    ASMA  No results found for: SMOOTHMUSCAB    Ceruloplasmin  No results found for: CERULOPLSM    Celiac panel  No results found for: TISSTRNTIIGG, TTGIGA, IGA    PT/INR  Recent Labs     08/07/20  0606   PROTIME 9.8   INR 0.9       Cancer Markers:  CEA:    Recent Labs     08/07/20  1239   CEA 3.8     Ca 125:  No results for input(s):  in the last 72 hours. Ca 19-9:   Invalid input(s):   AFP: No results for input(s): AFP in the last 72 hours. Lactic acid:Invalid input(s): LACTIC ACID    Radiology Review:    No results found. Principal Problem:    Intrahepatic bile duct dilation  Active Problems:    Cholecystitis    Gastroesophageal reflux disease without esophagitis    Alcohol use    Portal hypertension (HCC)    Esophageal varices without bleeding (HCC)    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HCC)    Elevated bilirubin    Elevated CA 19-9 level    Alcohol abuse  Resolved Problems:    * No resolved hospital problems.  *       GI Assessment and plan:    Dilated and intra-and extrahepatic biliary duct on CT s/p ERCP on 8/7>2 cm mass at the bifurcation of CBD with right hepatic duct system opacification and a stent was inserted into the right hepatic duct above the tumor. Left hepatic duct system cannulation was unsuccessful. Left hepatic system was neither cannulated nor opacified. Cytology brushing was obtained from the hilar mass. 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 Follow-up with biopsy report. Expecting the LFTs to improve with bile drainage    Referral to hepatobiliary surgeon (Dr. Lyla Cortez) for likely cholangiocarcinoma    Will need EUS as outpatient to biopsy lymph node like structures around the bile duct for staging from possible cholangiocarcinoma. If lymph node biopsies are positive for malignancy then he would not be a transplant candidate, and in that case uncovered metal stent can be placed for palliative purposes. Likely referral to heme-onc once the biopsy results are available. Continue to observe for acute cholecystitis. Will need cholecystectomy once stable    Will need outpatient colonoscopy as he never got one in the past.    We will sign off. Thank you for allowing me to participate in the care of your patient. Please feel free to contact me with any questions or concerns. Keri Bentley MD  RESIDENT, Alley Urban  8/8/2020,7:56 AM   Attending Physician Statement  I have discussed the care of Jonathan Rodrigues and   I have examined the patient myselft independently, and taken ros and hpi , including pertinent history and exam findings,  with the author of this note . I have reviewed the key elements of all parts of the encounter with the nurse practitioner/resident.     I agree with the assessment, plan and orders as documented by the above health care provider       Outpatient follow-up with biliary surgeon  Outpatient follow with oncology  Outpatient follow-up with us  Needs EUS as an outpatient  Electronically signed by Jessica Tate MD

## 2020-08-08 NOTE — PLAN OF CARE
Problem:  Activity:  Goal: Risk for activity intolerance will decrease  Description: Risk for activity intolerance will decrease  8/8/2020 0438 by Lisa King RN  Outcome: Ongoing  8/8/2020 0438 by Lisa King RN  Outcome: Ongoing     Problem: Falls - Risk of:  Goal: Will remain free from falls  Description: Will remain free from falls  Outcome: Ongoing  Goal: Absence of physical injury  Description: Absence of physical injury  Outcome: Ongoing     Problem: Nutrition  Goal: Optimal nutrition therapy  Description: Nutrition Problem #1: Inadequate oral intake  Intervention: Food and/or Nutrient Delivery: Continue Current Diet, Start Oral Nutrition Supplement  Nutritional Goals: Meet greater than 75% of recommended nutritional intake     Outcome: Ongoing     Problem: Pain:  Goal: Pain level will decrease  Description: Pain level will decrease  Outcome: Ongoing  Goal: Control of acute pain  Description: Control of acute pain  Outcome: Ongoing  Goal: Control of chronic pain  Description: Control of chronic pain  Outcome: Ongoing

## 2020-08-08 NOTE — CARE COORDINATION
Received call from Dr. Jarrod Huizar requesting patient be transferred to [facility] for Hepatobiliary Surgeon. [employer] and initiated transfer to [facility].

## 2020-08-08 NOTE — DISCHARGE SUMMARY
Mil[address] Neely [address]    Discharge Summary     Patient ID: Diamond Ndiaye  :  1960   MRN: [id number]     ACCOUNT:  [de-identified]   Patient's PCP: JUAN CARLOS Kelly - CNP  Admit Date: 2020   Discharge Date: 2020 ***    Length of Stay: 2  Code Status:  Full Code  Admitting Physician: Trista Overton MD  Discharge Physician: Trista Overton MD     Active Discharge Diagnoses:     Hospital Problem Lists:  Principal Problem:    Intrahepatic bile duct dilation  Active Problems:    Cholecystitis    Gastroesophageal reflux disease without esophagitis    Alcohol use    Portal hypertension ([facility] [address]    Esophageal varices without bleeding (HCC)    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HCC)    Elevated bilirubin    Elevated CA 19-9 level    Alcohol abuse  Resolved Problems:    * No resolved hospital problems. *      Admission Condition:  {condition:77775}     Discharged Condition: {condition:09367}    Hospital Stay:     Hospital Course:  Diamond Ndiaye is a 64 y.o. male who was admitted for the management of  *** Intrahepatic bile duct dilation , presented to ER with ***No chief complaint on file. Significant therapeutic interventions: ***    Significant Diagnostic Studies:   Labs / Micro:  {JSSK:[id number]}   ***  Radiology:  Ct Abdomen Pelvis W Iv Contrast Additional Contrast? Radiologist Recommendation    Result Date: 2020  1. Gallbladder wall thickening with cholelithiasis and increased attenuation of the gallbladder lumen. Differential includes acute cholecystitis. 2. Intrahepatic biliary dilatation with nondilated common bile duct. This may be related to the adjacent inflammatory changes within the gallbladder. Alternatively, tumor such as a Klatskin's tumor are possibilities. 3. Portal hypertension with enlarged upper abdominal varices and distal esophageal varices. Mild splenomegaly. No significant ascites. Hepatomegaly.   Multiple hepatic lesions most consistent with simple cysts. 4. No acute bowel pathology. Findings were discussed with Dr. Michael Sanchez at 7:05 pm on 8/4/2020. RECOMMENDATIONS: Recommend follow-up evaluation of the gallbladder with ultrasound. Additional evaluation of the biliary tract findings can be performed with MRCP or ERCP. Fl Ercp Biliary And Pancreatic S&i    Result Date: 8/7/2020  Intraoperative fluoroscopy provided for ERCP. Please refer to the procedure report for further details. Us Gallbladder Ruq    Result Date: 8/6/2020  1. Gallbladder is filled with sludge/stones in addition to gallbladder wall thickening and pericholecystic fluid. Overall, findings are concerning for acute cholecystitis. However, sonographer reports a negative Logan's sign. There is Doppler color within the gallbladder lumen, but this is likely artifactual related to mobile sludge. Possibility of an underlying isoechoic polyp or mass would be difficult to exclude. 2.  Intrahepatic and extrahepatic biliary dilatation. Common bile duct measures up to 1 cm. Additional evaluation with MRCP/ERCP is recommended. 3.  Hepatic cysts. 4.  Increased and coarsened echotexture of the liver, which can be seen with diffuse hepatocellular disease, most commonly hepatic steatosis. 5.  Isoechoic mass seen in the central right kidney is most likely a normal variant column of Guillermo. Mri Abdomen W Wo Contrast Mrcp    Result Date: 8/8/2020  1. Mild to moderate intrahepatic biliary duct dilation with obstruction at the level of the confluence of the left and right hepatic ducts. There is very subtle heterogeneous enhancing lesion in this region measuring approximately 2.0 x 1.7 cm suspicious for neoplasm such as cholangiocarcinoma. 2. Persistent distention of the gallbladder with sludge, cholelithiasis and wall thickening. MR findings are nonspecific for acute cholecystitis.   There could be occlusion of the cystic duct which is otherwise not well-visualized secondary to the mass mentioned above. No enhancement is noted within the gallbladder itself to suggest an underlying mass. 3. Borderline enlarged periportal lymph nodes the largest measuring up to 10 mm but better visualized on prior CT. These are nonspecific. Consultations:    Consults:     Final Specialist Recommendations/Findings:   IP CONSULT TO GENERAL SURGERY  IP CONSULT TO GI      The patient was seen and examined on day of discharge and this discharge summary is in conjunction with any daily progress note from day of discharge. 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 hepatic duct placement       Requiring Further Evaluation/Follow Up POST HOSPITALIZATION/Incidental Findings: ***    Diet: {diet:17080}    Activity: As tolerated***    Instructions to Patient: ***    Discharge Medications:      Medication List      START taking these medications    enoxaparin 40 MG/0.4ML injection  Commonly known as:  LOVENOX  Inject 0.4 mLs into the skin daily  Start taking on:  August 9, 3792     folic acid 1 MG tablet  Commonly known as:  FOLVITE  Take 1 tablet by mouth daily  Start taking on:  August 9, 2020     LORazepam 1 MG tablet  Commonly known as:  ATIVAN  Take 1 tablet by mouth every 6 hours as needed for Anxiety (alcohol withdrawal) for up to 30 days. nicotine 21 MG/24HR  Commonly known as:  NICODERM CQ  Place 1 patch onto the skin daily as needed (if patient is a smoker and requests nicotine replacemnt therapy)     oxyCODONE 5 MG immediate release tablet  Commonly known as:  ROXICODONE  Take 1 tablet by mouth every 4 hours as needed for Pain for up to 3 days. polyethylene glycol 17 g packet  Commonly known as:  GLYCOLAX  Take 17 g by mouth daily as needed for Constipation     promethazine 12.5 MG tablet  Commonly known as:  PHENERGAN  Take 1 tablet by mouth every 6 hours as needed for Nausea     thiamine 100 MG tablet  Take 1 tablet by mouth daily  Start taking on:  August 9, 2020        Devota Socks taking these medications    omeprazole 40 MG delayed release capsule  Commonly known as:  PRILOSEC  Take 1 capsule by mouth every morning (before breakfast)        STOP taking these medications    ibuprofen 600 MG tablet  Commonly known as:  ADVIL;MOTRIN           Where to Get Your Medications      You can get these medications from any pharmacy    Bring a paper prescription for each of these medications  · LORazepam 1 MG tablet  · oxyCODONE 5 MG immediate release tablet     Information about where to get these medications is not yet available    Ask your nurse or doctor about these medications  · enoxaparin 40 UH/8.4JA injection  · folic acid 1 MG tablet  · nicotine 21 MG/24HR  · polyethylene glycol 17 g packet  · promethazine 12.5 MG tablet  · thiamine 100 MG tablet         No discharge procedures on file. Time Spent on discharge is  {Blank single:62497::\"15 mins\",\"17 mins\",\"20 mins\",\"31 mins\",\"32 mins\",\"33 mins\",\"34 mins\",\"35 mins\",\"36 mins\",\"37 mins\",\"38 mins\",\"39 mins\",\"40 mins\",\"41 mins\",\"43 mins\",\"45 mins\",\"50 mins\",\"***\"} in patient examination, evaluation, counseling as well as medication reconciliation, prescriptions for required medications, discharge plan and follow up. Electronically signed by   Haider Miller MD  8/8/2020  4:06 PM      Thank you JUAN CARLOS Bermeo - DEANNA for the opportunity to be involved in this patient's care.

## 2020-08-08 NOTE — PLAN OF CARE
Problem: Activity:  Goal: Risk for activity intolerance will decrease  Description: Risk for activity intolerance will decrease  8/8/2020 1406 by Sasha Ortega RN  Outcome: Ongoing  8/8/2020 0438 by Autumn Abdi RN  Outcome: Ongoing     Problem:  Bowel/Gastric:  Goal: Bowel function will improve  Description: Bowel function will improve  8/8/2020 1406 by Sasha Ortega RN  Outcome: Ongoing  8/8/2020 0438 by Autumn Abdi RN  Outcome: Ongoing  Goal: Diagnostic test results will improve  Description: Diagnostic test results will improve  8/8/2020 1406 by Sasha Ortega RN  Outcome: Ongoing  8/8/2020 0438 by Autumn Abdi RN  Outcome: Ongoing  Goal: Occurrences of nausea will decrease  Description: Occurrences of nausea will decrease  8/8/2020 1406 by Sasha Ortega RN  Outcome: Ongoing  8/8/2020 0438 by Autumn Abdi RN  Outcome: Ongoing  Goal: Occurrences of vomiting will decrease  Description: Occurrences of vomiting will decrease  8/8/2020 1406 by Sasha Ortega RN  Outcome: Ongoing  8/8/2020 0438 by Autumn Abdi RN  Outcome: Ongoing     Problem: Fluid Volume:  Goal: Maintenance of adequate hydration will improve  Description: Maintenance of adequate hydration will improve  8/8/2020 1406 by Sasha Ortega RN  Outcome: Ongoing  8/8/2020 0438 by Autumn Abdi RN  Outcome: Ongoing     Problem: Health Behavior:  Goal: Ability to state signs and symptoms to report to health care provider will improve  Description: Ability to state signs and symptoms to report to health care provider will improve  8/8/2020 1406 by Sasha Ortega RN  Outcome: Ongoing  8/8/2020 0438 by Autumn Abdi RN  Outcome: Ongoing     Problem: Physical Regulation:  Goal: Complications related to the disease process, condition or treatment will be avoided or minimized  Description: Complications related to the disease process, condition or treatment will be avoided or minimized  8/8/2020 1406 by Cynthia Puga RN  Outcome: Ongoing  8/8/2020 0438 by Eloy Greenwood RN  Outcome: Ongoing  Goal: Ability to maintain clinical measurements within normal limits will improve  Description: Ability to maintain clinical measurements within normal limits will improve  8/8/2020 1406 by Cynthia Puga RN  Outcome: Ongoing  8/8/2020 0438 by Eloy Greenwood RN  Outcome: Ongoing     Problem: Sensory:  Goal: Ability to identify factors that increase the pain will improve  Description: Ability to identify factors that increase the pain will improve  8/8/2020 1406 by Cynthia Puga RN  Outcome: Ongoing  8/8/2020 0438 by Eloy Greenwood RN  Outcome: Ongoing  Goal: Ability to notify healthcare provider of pain before it becomes unmanageable or unbearable will improve  Description: Ability to notify healthcare provider of pain before it becomes unmanageable or unbearable will improve  8/8/2020 1406 by Cynthia Puga RN  Outcome: Ongoing  8/8/2020 0438 by Eloy Greenwood RN  Outcome: Ongoing  Goal: Pain level will decrease  Description: Pain level will decrease  8/8/2020 1406 by Cynthia Puga RN  Outcome: Ongoing  8/8/2020 0438 by Eloy Greenwood RN  Outcome: Ongoing     Problem: Falls - Risk of:  Goal: Will remain free from falls  Description: Will remain free from falls  8/8/2020 1406 by Cynthia Puga RN  Outcome: Ongoing  8/8/2020 0438 by Eloy Greenwood RN  Outcome: Ongoing  Goal: Absence of physical injury  Description: Absence of physical injury  8/8/2020 1406 by Cynthia Puga RN  Outcome: Ongoing  8/8/2020 0438 by Eloy Greenwood RN  Outcome: Ongoing     Problem: Nutrition  Goal: Optimal nutrition therapy  Description: Nutrition Problem #1: Inadequate oral intake  Intervention: Food and/or Nutrient Delivery: Continue Current Diet, Start Oral Nutrition Supplement  Nutritional Goals: Meet greater than 75% of recommended nutritional intake     8/8/2020 1406 by Maria John RN  Outcome: Ongoing  8/8/2020 0438 by Claudia Black RN  Outcome: Ongoing     Problem: Pain:  Goal: Pain level will decrease  Description: Pain level will decrease  8/8/2020 1406 by Maria John RN  Outcome: Ongoing  8/8/2020 0438 by Claudia Black RN  Outcome: Ongoing  Goal: Control of acute pain  Description: Control of acute pain  8/8/2020 1406 by Maria John RN  Outcome: Ongoing  8/8/2020 0438 by Claudia Black RN  Outcome: Ongoing  Goal: Control of chronic pain  Description: Control of chronic pain  8/8/2020 1406 by Maria John RN  Outcome: Ongoing  8/8/2020 0438 by Claudia Black RN  Outcome: Ongoing

## 2020-08-08 NOTE — DISCHARGE SUMMARY
Mil[facility] Neely [facility]    Discharge Summary     Patient ID: Chadwick Diego  :  1960   MRN: [id number]     ACCOUNT:  [de-identified]   Patient's PCP: JUAN CARLOS Mendoza CNP  Admit Date: 2020   Discharge Date: 2020     Length of Stay: 2  Code Status:  Full Code  Admitting Physician: Dagmar Stearns MD  Discharge Physician: Dagmar Stearns MD     Active Discharge Diagnoses:     Hospital Problem Lists:  Principal Problem:    Intrahepatic bile duct dilation  Active Problems:    Cholecystitis    Gastroesophageal reflux disease without esophagitis    Alcohol use    Portal hypertension ([address]    Esophageal varices without bleeding (HCC)    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HCC)    Elevated bilirubin    Elevated CA 19-9 level    Alcohol abuse  Resolved Problems:    * No resolved hospital problems. *      Admission Condition:  fair     Discharged Condition: fair    Hospital Stay:     Hospital Course:  Chadwick Diego is a 64 y.o. male who was admitted for the management of   Intrahepatic bile duct dilation , presented to ER with abnormal finding on abd US and CT. \"Bernabe Garza is a 64 y.o. Non-/non  male who presents with No chief complaint on file.   and is admitted to the hospital for the management of Cholecystitis.     This is a 64 yr old male who presented to [facility] under the direction of his PCP. Vasquez Martell reports having outpatient CT scan and GB ultrasound done which revealed acute cholecystitis, intrahepatic biliary dilation without dilation of the common bile duct, ABD and esophageal varices, and hepatic cysts.  Alk. Phos, Total Bili, and liver enzymes elevated.  Patient denies any ABD symptoms or complaints, denies N/V/D, reports normal bowel habits.  No changes in PO intake/appetite.  Patient states he had outpatient testing done as his \"wife and children were concerned about health\".  The patient does endorse alcohol consumption, approx 8 beers/day. Timur Lo is a , states he does not drink when 'out on the road'.  Denies any history of alcohol withdrawal or seizures related to lack of alcohol.  Patient is transferred to our facility for further consultation and with potential need for surgical intervention. COVID-19 swab negative     Pertinent Labs/Dignostics: CRT: 0.65, Alk. Phos: 1,073, ALT: 214, AST: 222, Bili: 2.76\"    Patient is status post ERCP, brushing of a hilar mass and stent placement in the right hepatic duct.     Patient admits to 42 pound weight loss in the last several months. He does drink alcohol on a routine basis when he is not working. He also smokes tobacco.       Patient is starting to experience some right upper quadrant pain today, asking for pain meds. Oxycodone did help. He denies any nausea or vomiting. His CA-19-9 returned: 232. MRCP results concerning for cholangiocarcinoma. Family aware, but pathology pending. General surgery recommends patient be transferred to [facility] to see hepatobiliary surgeon, Dr. Ludin Archer. Since patient is becoming more jaundiced, and and bilirubin elevating, Dr. Ludin Archer recommends transfer to [facility] for further evaluation. Patient and family are agreeable patient is stable for discharge to [facility].  Discussed case with Dr. Perla Humphries who agrees to accept patient. Significant therapeutic interventions:  See above    Significant Diagnostic Studies:   Labs / Micro:  CA 19-9 : 232    Radiology:  Ct Abdomen Pelvis W Iv Contrast Additional Contrast? Radiologist Recommendation    Result Date: 8/5/2020  1. Gallbladder wall thickening with cholelithiasis and increased attenuation of the gallbladder lumen. Differential includes acute cholecystitis. 2. Intrahepatic biliary dilatation with nondilated common bile duct. This may be related to the adjacent inflammatory changes within the gallbladder.  Alternatively, tumor such as a Klatskin's tumor are possibilities. 3. Portal hypertension with enlarged upper abdominal varices and distal esophageal varices. Mild splenomegaly. No significant ascites. Hepatomegaly. Multiple hepatic lesions most consistent with simple cysts. 4. No acute bowel pathology. Findings were discussed with Dr. Smita Sanchez at 7:05 pm on 8/4/2020. RECOMMENDATIONS: Recommend follow-up evaluation of the gallbladder with ultrasound. Additional evaluation of the biliary tract findings can be performed with MRCP or ERCP. Fl Ercp Biliary And Pancreatic S&i    Result Date: 8/7/2020  Intraoperative fluoroscopy provided for ERCP. Please refer to the procedure report for further details. Us Gallbladder Ruq    Result Date: 8/6/2020  1. Gallbladder is filled with sludge/stones in addition to gallbladder wall thickening and pericholecystic fluid. Overall, findings are concerning for acute cholecystitis. However, sonographer reports a negative Logan's sign. There is Doppler color within the gallbladder lumen, but this is likely artifactual related to mobile sludge. Possibility of an underlying isoechoic polyp or mass would be difficult to exclude. 2.  Intrahepatic and extrahepatic biliary dilatation. Common bile duct measures up to 1 cm. Additional evaluation with MRCP/ERCP is recommended. 3.  Hepatic cysts. 4.  Increased and coarsened echotexture of the liver, which can be seen with diffuse hepatocellular disease, most commonly hepatic steatosis. 5.  Isoechoic mass seen in the central right kidney is most likely a normal variant column of Guillermo. Mri Abdomen W Wo Contrast Mrcp    Result Date: 8/8/2020  1. Mild to moderate intrahepatic biliary duct dilation with obstruction at the level of the confluence of the left and right hepatic ducts. There is very subtle heterogeneous enhancing lesion in this region measuring approximately 2.0 x 1.7 cm suspicious for neoplasm such as cholangiocarcinoma.  2. Persistent distention of the gallbladder with sludge, cholelithiasis and wall thickening. MR findings are nonspecific for acute cholecystitis. There could be occlusion of the cystic duct which is otherwise not well-visualized secondary to the mass mentioned above. No enhancement is noted within the gallbladder itself to suggest an underlying mass. 3. Borderline enlarged periportal lymph nodes the largest measuring up to 10 mm but better visualized on prior CT. These are nonspecific. Consultations:    Consults:     Final Specialist Recommendations/Findings:   IP CONSULT TO GENERAL SURGERY  IP CONSULT TO GI      The patient was seen and examined on day of discharge and this discharge summary is in conjunction with any daily progress note from day of discharge. 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 Rt hepatic duct placement       Diet: regular diet    Activity: As tolerated    Instructions to Patient: stop smoking and drinking Etoh    Discharge Medications:      Medication List      START taking these medications    enoxaparin 40 MG/0.4ML injection  Commonly known as:  LOVENOX  Inject 0.4 mLs into the skin daily  Start taking on:  August 9, 7465     folic acid 1 MG tablet  Commonly known as:  FOLVITE  Take 1 tablet by mouth daily  Start taking on:  August 9, 2020     LORazepam 1 MG tablet  Commonly known as:  ATIVAN  Take 1 tablet by mouth every 6 hours as needed for Anxiety (alcohol withdrawal) for up to 30 days.      nicotine 21 MG/24HR  Commonly known as:  [address] [facility] 1 patch onto the skin daily as needed (if patient is a smoker and requests nicotine replacemnt therapy)     oxyCODONE 5

## 2020-08-08 NOTE — PROGRESS NOTES
further consultation and with potential need for surgical intervention. COVID-19 swab negative     Pertinent Labs/Dignostics: CRT: 0.65, Alk. Phos: 1,073, ALT: 214, AST: 222, Bili: 2.76\"       Review of Systems:     Constitutional:  negative for chills, fevers, sweats  Respiratory:  negative for cough, dyspnea on exertion, shortness of breath, wheezing  Cardiovascular:  negative for chest pain, chest pressure/discomfort, lower extremity edema, palpitations  Gastrointestinal: Positive for abdominal pain, no constipation, diarrhea, nausea, vomiting  Neurological:  negative for dizziness, headache    Medications: Allergies: Allergies   Allergen Reactions    Asa [Aspirin]     Chocolate Swelling       Current Meds:   Scheduled Meds:    sodium chloride flush  10 mL Intravenous BID    thiamine  100 mg Oral Daily    folic acid  1 mg Oral Daily    sodium chloride flush  10 mL Intravenous 2 times per day    enoxaparin  40 mg Subcutaneous Daily     Continuous Infusions:    sodium chloride 100 mL/hr at 08/06/20 2255     PRN Meds: oxyCODONE **OR** oxyCODONE, sodium chloride flush, potassium chloride **OR** potassium alternative oral replacement **OR** potassium chloride, magnesium sulfate, polyethylene glycol, promethazine **OR** ondansetron, nicotine    Data:     Past Medical History:   has a past medical history of Anxiety, Chronic back pain, Depression, Gastroesophageal reflux disease without esophagitis, and 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[facility] [address] Social History:   reports that he has been smoking cigarettes. He has a 20.00 pack-year smoking history. He has never used smokeless tobacco. He reports current alcohol use of about 5.8 standard drinks of alcohol per week. He reports that he does not use drugs.      Family History:   Family History   Problem Relation Age of Onset    Hypertension Mother     Stroke Mother     High Blood Pressure Mother     Diabetes Father     Hypertension Father     High Blood Pressure Father  Heart Disease Father        Vitals:  /71   Pulse 80   Temp 97.6 °F (36.4 °C) (Oral)   Resp 16   Ht 5' 9\" (1.753 m)   Wt 195 lb (88.5 kg)   SpO2 95%   BMI 28.80 kg/m²   Temp (24hrs), Av.9 °F (36.6 °C), Min:97.6 °F (36.4 °C), Max:98.2 °F (36.8 °C)    No results for input(s): POCGLU in the last 72 hours. I/O (24Hr): Intake/Output Summary (Last 24 hours) at 2020 1601  Last data filed at 2020 0518  Gross per 24 hour   Intake 3809 ml   Output --   Net 3809 ml       Labs:  Hematology:  Recent Labs     20  1513 08/07/20  0606   WBC 9.9 7.8   RBC 4.00* 4.00*   HGB 14.1 13.9   HCT 41.1 41.8   .8 104.5*   MCH 35.3* 34.8*   MCHC 34.3 33.3   RDW 12.3 12.7    196   MPV 9.2 9.7   INR  --  0.9     Chemistry:  Recent Labs     20  0606 20  0610    138 139   K 3.7 4.1 4.1    104 104   CO2 25 22 23   GLUCOSE 122* 105* 114*   BUN 12 8 7   CREATININE 0.65* 0.52* 0.45*   ANIONGAP 11 12 12   LABGLOM >60 >60 >60   GFRAA >60 >60 >60   CALCIUM 9.4 8.8 8.6     Recent Labs     20  1513 08/07/20  0606 20  0610   PROT 6.9 6.2* 6.2*   LABALBU 3.9 3.3* 3.4*   * 217* 188*   * 197* 190*   ALKPHOS 1,073* 1,031* 970*   BILITOT 2.76* 4.12* 6.20*   BILIDIR  --  3.47*  --    LIPASE 54  --   --      ABG:No results found for: POCPH, PHART, PH, POCPCO2, RPW2GJC, PCO2, POCPO2, PO2ART, PO2, POCHCO3, QRR1NVA, HCO3, NBEA, PBEA, BEART, BE, THGBART, THB, ZSR8EIV, MOJD5YMH, G6YDUEIU, O2SAT, FIO2  No results found for: SPECIAL  No results found for: CULTURE    Radiology:  Ct Abdomen Pelvis W Iv Contrast Additional Contrast? Radiologist Recommendation    Result Date: 2020  1. Gallbladder wall thickening with cholelithiasis and increased attenuation of the gallbladder lumen. Differential includes acute cholecystitis. 2. Intrahepatic biliary dilatation with nondilated common bile duct.   This may be related to the adjacent inflammatory changes within the gallbladder. Alternatively, tumor such as a Klatskin's tumor are possibilities. 3. Portal hypertension with enlarged upper abdominal varices and distal esophageal varices. Mild splenomegaly. No significant ascites. Hepatomegaly. Multiple hepatic lesions most consistent with simple cysts. 4. No acute bowel pathology. Findings were discussed with Dr. Corrie Sanchez at 7:05 pm on 8/4/2020. RECOMMENDATIONS: Recommend follow-up evaluation of the gallbladder with ultrasound. Additional evaluation of the biliary tract findings can be performed with MRCP or ERCP. Fl Ercp Biliary And Pancreatic S&i    Result Date: 8/7/2020  Intraoperative fluoroscopy provided for ERCP. Please refer to the procedure report for further details. Us Gallbladder Ruq    Result Date: 8/6/2020  1. Gallbladder is filled with sludge/stones in addition to gallbladder wall thickening and pericholecystic fluid. Overall, findings are concerning for acute cholecystitis. However, sonographer reports a negative Logan's sign. There is Doppler color within the gallbladder lumen, but this is likely artifactual related to mobile sludge. Possibility of an underlying isoechoic polyp or mass would be difficult to exclude. 2.  Intrahepatic and extrahepatic biliary dilatation. Common bile duct measures up to 1 cm. Additional evaluation with MRCP/: Is recommended. 3.  Hepatic cysts. 4.  Increased and coarsened echotexture of the liver, which can be seen with diffuse hepatocellular disease, most commonly hepatic steatosis. 5.  Isoechoic mass seen in the central right kidney is most likely a normal variant column of Guillermo. MRCP:  Impression:          1.  Mild to moderate intrahepatic biliary duct dilation with obstruction at   the level of the confluence of the left and right hepatic ducts. Dwight Hearing is   very subtle heterogeneous enhancing lesion in this region measuring   approximately 2.0 x 1.7 cm suspicious for neoplasm such as cholangiocarcinoma. 2. Persistent distention of the gallbladder with sludge, cholelithiasis and   wall thickening.  MR findings are nonspecific for acute cholecystitis.  There   could be occlusion of the cystic duct which is otherwise not well-visualized   secondary to the mass mentioned above.  No enhancement is noted within the   gallbladder itself to suggest an underlying mass. 3. Borderline enlarged periportal lymph nodes the largest measuring up to 10   mm but better visualized on prior CT.  These are nonspecific. Physical Examination:        General appearance:  alert, cooperative and no distress  Mental Status:  oriented to person, place and time and normal affect  Lungs:  clear to auscultation bilaterally, normal effort  Heart:  regular rate and rhythm, no murmur  Abdomen:  Soft, TTP right upper quadrant, nondistended, normal bowel sounds, no masses, hepatomegaly,   Extremities:  no edema, redness, tenderness in the calves  Skin:  no gross lesions, rashes, induration    Assessment:        Hospital Problems           Last Modified POA    * (Principal) Intrahepatic bile duct dilation 8/7/2020 Yes    Cholecystitis 8/7/2020 Yes    Gastroesophageal reflux disease without esophagitis 8/6/2020 Yes    Alcohol use 8/6/2020 Yes    Portal hypertension ([address] 8/6/2020 Yes    Esophageal varices without bleeding ([address] 8/7/2020 Yes    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[address] 8/7/2020 Yes    Elevated bilirubin 8/7/2020 Yes    Elevated CA 19-9 level 8/7/2020 Yes    Alcohol abuse 8/7/2020 Yes          Plan:        1. Intrahepatic bile duct dilatation status post ERCP with stent of right hepatic duct  2. Hilar mass suggestive of Klatskin's tumor-await pathology, CA-19-9 elevated, discussed with Dr. Lyla Cortez. In light of bilirubin elevating will transfer to [facility] for further evaluation. CA 19- 9- 232, elevated. MRCP reviewed, 2 x 1.7 cm mass  3. Hyperbilirubinemia- likely from obstruction, #2  4.  Alcohol abuse- start IV

## 2020-08-09 VITALS
TEMPERATURE: 98.1 F | HEIGHT: 69 IN | BODY MASS INDEX: 28.88 KG/M2 | SYSTOLIC BLOOD PRESSURE: 131 MMHG | DIASTOLIC BLOOD PRESSURE: 95 MMHG | OXYGEN SATURATION: 99 % | HEART RATE: 70 BPM | RESPIRATION RATE: 16 BRPM | WEIGHT: 195 LBS

## 2020-08-09 LAB
ALBUMIN SERPL-MCNC: 3.6 G/DL (ref 3.5–5.2)
ALBUMIN/GLOBULIN RATIO: 1.2 (ref 1–2.5)
ALP BLD-CCNC: 1037 U/L (ref 40–129)
ALT SERPL-CCNC: 195 U/L (ref 5–41)
ANION GAP SERPL CALCULATED.3IONS-SCNC: 11 MMOL/L (ref 9–17)
AST SERPL-CCNC: 174 U/L
BILIRUB SERPL-MCNC: 3.85 MG/DL (ref 0.3–1.2)
BUN BLDV-MCNC: 8 MG/DL (ref 6–20)
BUN/CREAT BLD: ABNORMAL (ref 9–20)
CALCIUM SERPL-MCNC: 9.3 MG/DL (ref 8.6–10.4)
CHLORIDE BLD-SCNC: 102 MMOL/L (ref 98–107)
CO2: 26 MMOL/L (ref 20–31)
CREAT SERPL-MCNC: 0.62 MG/DL (ref 0.7–1.2)
GFR AFRICAN AMERICAN: >60 ML/MIN
GFR NON-AFRICAN AMERICAN: >60 ML/MIN
GFR SERPL CREATININE-BSD FRML MDRD: ABNORMAL ML/MIN/{1.73_M2}
GFR SERPL CREATININE-BSD FRML MDRD: ABNORMAL ML/MIN/{1.73_M2}
GLUCOSE BLD-MCNC: 105 MG/DL (ref 70–99)
POTASSIUM SERPL-SCNC: 4.6 MMOL/L (ref 3.7–5.3)
SODIUM BLD-SCNC: 139 MMOL/L (ref 135–144)
TOTAL PROTEIN: 6.7 G/DL (ref 6.4–8.3)

## 2020-08-09 PROCEDURE — 6370000000 HC RX 637 (ALT 250 FOR IP): Performed by: INTERNAL MEDICINE

## 2020-08-09 PROCEDURE — 2580000003 HC RX 258: Performed by: INTERNAL MEDICINE

## 2020-08-09 PROCEDURE — 36415 COLL VENOUS BLD VENIPUNCTURE: CPT

## 2020-08-09 PROCEDURE — 80053 COMPREHEN METABOLIC PANEL: CPT

## 2020-08-09 RX ADMIN — Medication 100 MG: at 07:55

## 2020-08-09 RX ADMIN — FOLIC ACID 1 MG: 1 TABLET ORAL at 07:55

## 2020-08-09 RX ADMIN — OXYCODONE HYDROCHLORIDE 10 MG: 5 TABLET ORAL at 03:52

## 2020-08-09 RX ADMIN — Medication 10 ML: at 07:55

## 2020-08-09 ASSESSMENT — PAIN SCALES - GENERAL: PAINLEVEL_OUTOF10: 8

## 2020-08-09 NOTE — PROGRESS NOTES
[address]    Progress Note    8/9/2020    7:38 AM    Name:   Guicho Garza  MRN:     [id number]     Acct:      [de-identified]   Room:   [facility] Day:  3  Admit Date:  8/6/2020  6:42 PM    PCP:   JUAN CARLOS Nunez CNP  Code Status:  Full Code    Subjective:     C/C: abd pain, abnormal CT and GB US    Interval History Status: not changed. Patient is status post ERCP, brushing of a hilar mass and stent placement in the right hepatic duct. Patient admits to 42 pound weight loss in the last several months. He does drink alcohol on a routine basis when he is not working. He also smokes tobacco.      Patient is starting to experience some right upper quadrant pain today, asking for pain meds. Oxycodone did help. He denies any nausea or vomiting. His wife just entered the room. Brief History:     Per my NP:  \"Bernabe Garza is a 64 y.o. Non-/non  male who presents with No chief complaint on file. and is admitted to the hospital for the management of Cholecystitis.     This is a 64 yr old male who presented to [facility] under the direction of his PCP. Patient reports having outpatient CT scan and GB ultrasound done which revealed acute cholecystitis, intrahepatic biliary dilation without dilation of the common bile duct, ABD and esophageal varices, and hepatic cysts. Alk. Phos, Total Bili, and liver enzymes elevated. Patient denies any ABD symptoms or complaints, denies N/V/D, reports normal bowel habits. No changes in PO intake/appetite. Patient states he had outpatient testing done as his \"wife and children were concerned about health\". The patient does endorse alcohol consumption, approx 8 beers/day. He is a , states he does not drink when 'out on the road'. Denies any history of alcohol withdrawal or seizures related to lack of alcohol.   Patient is transferred to our facility for further consultation and with potential need for surgical intervention. COVID-19 swab negative     Pertinent Labs/Dignostics: CRT: 0.65, Alk. Phos: 1,073, ALT: 214, AST: 222, Bili: 2.76\"       Review of Systems:     Constitutional:  negative for chills, fevers, sweats  Respiratory:  negative for cough, dyspnea on exertion, shortness of breath, wheezing  Cardiovascular:  negative for chest pain, chest pressure/discomfort, lower extremity edema, palpitations  Gastrointestinal: Positive for abdominal pain, no constipation, diarrhea, nausea, vomiting  Neurological:  negative for dizziness, headache    Medications: Allergies: Allergies   Allergen Reactions    Asa [Aspirin]     Chocolate Swelling       Current Meds:   Scheduled Meds:    sodium chloride flush  10 mL Intravenous BID    thiamine  100 mg Oral Daily    folic acid  1 mg Oral Daily    sodium chloride flush  10 mL Intravenous 2 times per day    enoxaparin  40 mg Subcutaneous Daily     Continuous Infusions:    sodium chloride 100 mL/hr at 08/08/20 2227     PRN Meds: oxyCODONE **OR** oxyCODONE, sodium chloride flush, potassium chloride **OR** potassium alternative oral replacement **OR** potassium chloride, magnesium sulfate, polyethylene glycol, promethazine **OR** ondansetron, nicotine    Data:     Past Medical History:   has a past medical history of Anxiety, Chronic back pain, Depression, Gastroesophageal reflux disease without esophagitis, and 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[facility] [address] Social History:   reports that he has been smoking cigarettes. He has a 20.00 pack-year smoking history. He has never used smokeless tobacco. He reports current alcohol use of about 5.8 standard drinks of alcohol per week. He reports that he does not use drugs.      Family History:   Family History   Problem Relation Age of Onset    Hypertension Mother     Stroke Mother     High Blood Pressure Mother     Diabetes Father     Hypertension Father     High Blood Pressure Father  Heart Disease Father        Vitals:  /79   Pulse 78   Temp 98.3 °F (36.8 °C) (Oral)   Resp 16   Ht 5' 9\" (1.753 m)   Wt 195 lb (88.5 kg)   SpO2 100%   BMI 28.80 kg/m²   Temp (24hrs), Av °F (36.7 °C), Min:97.6 °F (36.4 °C), Max:98.3 °F (36.8 °C)    No results for input(s): POCGLU in the last 72 hours. I/O (24Hr): No intake or output data in the 24 hours ending 20 0738    Labs:  Hematology:  Recent Labs     20  06   WBC 9.9 7.8   RBC 4.00* 4.00*   HGB 14.1 13.9   HCT 41.1 41.8   .8 104.5*   MCH 35.3* 34.8*   MCHC 34.3 33.3   RDW 12.3 12.7    196   MPV 9.2 9.7   INR  --  0.9     Chemistry:  Recent Labs     20  0606 20  0610    138 139   K 3.7 4.1 4.1    104 104   CO2 25 22 23   GLUCOSE 122* 105* 114*   BUN 12 8 7   CREATININE 0.65* 0.52* 0.45*   ANIONGAP 11 12 12   LABGLOM >60 >60 >60   GFRAA >60 >60 >60   CALCIUM 9.4 8.8 8.6     Recent Labs     20  0606 20  0610   PROT 6.9 6.2* 6.2*   LABALBU 3.9 3.3* 3.4*   * 217* 188*   * 197* 190*   ALKPHOS 1,073* 1,031* 970*   BILITOT 2.76* 4.12* 6.20*   BILIDIR  --  3.47*  --    LIPASE 54  --   --      ABG:No results found for: POCPH, PHART, PH, POCPCO2, BVW8PEV, PCO2, POCPO2, PO2ART, PO2, POCHCO3, LAV2ATP, HCO3, NBEA, PBEA, BEART, BE, THGBART, THB, EDL9HQO, XSKR8SGP, I1TNDLSE, O2SAT, FIO2  No results found for: SPECIAL  No results found for: CULTURE    Radiology:  Ct Abdomen Pelvis W Iv Contrast Additional Contrast? Radiologist Recommendation    Result Date: 2020  1. Gallbladder wall thickening with cholelithiasis and increased attenuation of the gallbladder lumen. Differential includes acute cholecystitis. 2. Intrahepatic biliary dilatation with nondilated common bile duct. This may be related to the adjacent inflammatory changes within the gallbladder. Alternatively, tumor such as a Klatskin's tumor are possibilities.  3. Portal hypertension with enlarged upper abdominal varices and distal esophageal varices. Mild splenomegaly. No significant ascites. Hepatomegaly. Multiple hepatic lesions most consistent with simple cysts. 4. No acute bowel pathology. Findings were discussed with Dr. Flora Sanchez at 7:05 pm on 8/4/2020. RECOMMENDATIONS: Recommend follow-up evaluation of the gallbladder with ultrasound. Additional evaluation of the biliary tract findings can be performed with MRCP or ERCP. Fl Ercp Biliary And Pancreatic S&i    Result Date: 8/7/2020  Intraoperative fluoroscopy provided for ERCP. Please refer to the procedure report for further details. Us Gallbladder Ruq    Result Date: 8/6/2020  1. Gallbladder is filled with sludge/stones in addition to gallbladder wall thickening and pericholecystic fluid. Overall, findings are concerning for acute cholecystitis. However, sonographer reports a negative Logan's sign. There is Doppler color within the gallbladder lumen, but this is likely artifactual related to mobile sludge. Possibility of an underlying isoechoic polyp or mass would be difficult to exclude. 2.  Intrahepatic and extrahepatic biliary dilatation. Common bile duct measures up to 1 cm. Additional evaluation with MRCP/: Is recommended. 3.  Hepatic cysts. 4.  Increased and coarsened echotexture of the liver, which can be seen with diffuse hepatocellular disease, most commonly hepatic steatosis. 5.  Isoechoic mass seen in the central right kidney is most likely a normal variant column of Guillermo. MRCP:  Impression:          1. Mild to moderate intrahepatic biliary duct dilation with obstruction at   the level of the confluence of the left and right hepatic ducts. Ramiro Sos is   very subtle heterogeneous enhancing lesion in this region measuring   approximately 2.0 x 1.7 cm suspicious for neoplasm such as cholangiocarcinoma.    2. Persistent distention of the gallbladder with sludge, cholelithiasis and wall thickening.  MR findings are nonspecific for acute cholecystitis.  There   could be occlusion of the cystic duct which is otherwise not well-visualized   secondary to the mass mentioned above.  No enhancement is noted within the   gallbladder itself to suggest an underlying mass. 3. Borderline enlarged periportal lymph nodes the largest measuring up to 10   mm but better visualized on prior CT.  These are nonspecific. Physical Examination:        General appearance:  alert, cooperative and no distress  Mental Status:  oriented to person, place and time and normal affect  Lungs:  clear to auscultation bilaterally, normal effort  Heart:  regular rate and rhythm, no murmur  Abdomen:  Soft, TTP right upper quadrant, nondistended, normal bowel sounds, no masses, hepatomegaly,   Extremities:  no edema, redness, tenderness in the calves  Skin:  no gross lesions, rashes, induration    Assessment:        Hospital Problems           Last Modified POA    * (Principal) Intrahepatic bile duct dilation 8/7/2020 Yes    Cholecystitis 8/7/2020 Yes    Gastroesophageal reflux disease without esophagitis 8/6/2020 Yes    Alcohol use 8/6/2020 Yes    Portal hypertension ([address] 8/6/2020 Yes    Esophageal varices without bleeding ([address] 8/7/2020 Yes    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[address] 8/7/2020 Yes    Elevated bilirubin 8/7/2020 Yes    Elevated CA 19-9 level 8/7/2020 Yes    Alcohol abuse 8/7/2020 Yes          Plan:        1. Intrahepatic bile duct dilatation status post ERCP with stent of right hepatic duct  2. Hilar mass suggestive of Klatskin's tumor-await pathology, CA-19-9 elevated, discussed with Dr. Navi Harper. In light of bilirubin elevating will transfer to [facility] for further evaluation. CA 19- 9- 232, elevated. MRCP reviewed, 2 x 1.7 cm mass  3. Hyperbilirubinemia- likely from obstruction, #2  4. Alcohol abuse- start IV thiamine folate, CIWA protocol monitor for withdrawal  5. GERD-PPI  6.  EPC cuffs for DVT prophylaxis  7. Tobacco use-nicotine patch      General surgery is recommending transfer to [facility] to see Dr. Le Avery, a hepatobiliary surgeon. Since patient is becoming more jaundiced, and and bilirubin elevating, Dr. Le Avery recommends transfer to [facility] for further evaluation.   Patient and family are agreeable    Princess Boss MD  8/9/2020  7:38 AM

## 2020-08-09 NOTE — PLAN OF CARE
Problem: Activity:  Goal: Risk for activity intolerance will decrease  Description: Risk for activity intolerance will decrease  Outcome: Ongoing     Problem:  Bowel/Gastric:  Goal: Bowel function will improve  Description: Bowel function will improve  Outcome: Ongoing  Goal: Diagnostic test results will improve  Description: Diagnostic test results will improve  Outcome: Ongoing  Goal: Occurrences of nausea will decrease  Description: Occurrences of nausea will decrease  Outcome: Ongoing  Goal: Occurrences of vomiting will decrease  Description: Occurrences of vomiting will decrease  Outcome: Ongoing     Problem: Fluid Volume:  Goal: Maintenance of adequate hydration will improve  Description: Maintenance of adequate hydration will improve  Outcome: Ongoing     Problem: Health Behavior:  Goal: Ability to state signs and symptoms to report to health care provider will improve  Description: Ability to state signs and symptoms to report to health care provider will improve  Outcome: Ongoing     Problem: Physical Regulation:  Goal: Complications related to the disease process, condition or treatment will be avoided or minimized  Description: Complications related to the disease process, condition or treatment will be avoided or minimized  Outcome: Ongoing  Goal: Ability to maintain clinical measurements within normal limits will improve  Description: Ability to maintain clinical measurements within normal limits will improve  Outcome: Ongoing     Problem: Sensory:  Goal: Ability to identify factors that increase the pain will improve  Description: Ability to identify factors that increase the pain will improve  Outcome: Ongoing  Goal: Ability to notify healthcare provider of pain before it becomes unmanageable or unbearable will improve  Description: Ability to notify healthcare provider of pain before it becomes unmanageable or unbearable will improve  Outcome: Ongoing  Goal: Pain level will decrease  Description: Pain level will decrease  Outcome: Ongoing     Problem: Falls - Risk of:  Goal: Will remain free from falls  Description: Will remain free from falls  Outcome: Ongoing  Goal: Absence of physical injury  Description: Absence of physical injury  Outcome: Ongoing     Problem: Nutrition  Goal: Optimal nutrition therapy  Description: Nutrition Problem #1: Inadequate oral intake  Intervention: Food and/or Nutrient Delivery: Continue Current Diet, Start Oral Nutrition Supplement  Nutritional Goals: Meet greater than 75% of recommended nutritional intake     Outcome: Ongoing     Problem: Pain:  Goal: Pain level will decrease  Description: Pain level will decrease  Outcome: Ongoing  Goal: Control of acute pain  Description: Control of acute pain  Outcome: Ongoing  Goal: Control of chronic pain  Description: Control of chronic pain  Outcome: Ongoing

## 2020-08-09 NOTE — PROGRESS NOTES
Patient picked up by [facility]. Belongings sent with patient, including cell phone. Patient stable at this time. Report given to receiving RN at [facility] and all questions answered.     Electronically signed by Fermín Bowen RN on 8/9/2020 at 9:22 AM

## 2020-08-10 LAB — SURGICAL PATHOLOGY REPORT: NORMAL

## 2020-09-17 ENCOUNTER — OFFICE VISIT (OUTPATIENT)
Dept: SURGERY | Age: 60
End: 2020-09-17
Payer: COMMERCIAL

## 2020-09-17 ENCOUNTER — ANESTHESIA EVENT (OUTPATIENT)
Dept: OPERATING ROOM | Age: 60
End: 2020-09-17
Payer: COMMERCIAL

## 2020-09-17 VITALS
HEART RATE: 69 BPM | RESPIRATION RATE: 18 BRPM | SYSTOLIC BLOOD PRESSURE: 130 MMHG | DIASTOLIC BLOOD PRESSURE: 85 MMHG | WEIGHT: 187.9 LBS | TEMPERATURE: 98.6 F | BODY MASS INDEX: 27.83 KG/M2 | HEIGHT: 69 IN

## 2020-09-17 PROCEDURE — G8427 DOCREV CUR MEDS BY ELIG CLIN: HCPCS | Performed by: SURGERY

## 2020-09-17 PROCEDURE — 99203 OFFICE O/P NEW LOW 30 MIN: CPT | Performed by: SURGERY

## 2020-09-17 PROCEDURE — 3017F COLORECTAL CA SCREEN DOC REV: CPT | Performed by: SURGERY

## 2020-09-17 PROCEDURE — G8419 CALC BMI OUT NRM PARAM NOF/U: HCPCS | Performed by: SURGERY

## 2020-09-17 PROCEDURE — 4004F PT TOBACCO SCREEN RCVD TLK: CPT | Performed by: SURGERY

## 2020-09-17 RX ORDER — M-VIT,TX,IRON,MINS/CALC/FOLIC 27MG-0.4MG
1 TABLET ORAL DAILY
Status: ON HOLD | COMMUNITY
End: 2022-05-19 | Stop reason: HOSPADM

## 2020-09-17 NOTE — PROGRESS NOTES
GENERAL SURGERY CONSULTATION / OFFICE VISIT      Patient's Name/ Date of Birth/ Gender: Kostas Peña / 1960 (64 y.o.) / male     PCP: JUAN CARLOS Michel CNP  Referring: [facility]    History of present Illness:  Patient is a pleasant 64 y.o. male  S/p extrahepatic bile duct resection for cholangiocarcinoma with cary-en-y hepaticojejunostomy x2 August 10,2020 by Dr. Steve Danielle at [facility]. Now needs port. Planning chemo initiation next week in [place]. He opted not to go to [facility] for a second opinion. Past Medical History:  has a past medical history of Anxiety, Chronic back pain, Depression, Gastroesophageal reflux disease without esophagitis, and 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[facility] [address] Past Surgical History:   Past Surgical History:   Procedure Laterality Date    ERCP  08/07/2020    stent insertion, cholangiopancreatography  with brushings.  ERCP  8/7/2020    ERCP STENT INSERTION performed by Breonna Monteiro MD at [address] [place] [address] History:  reports that he has been smoking cigarettes. He has a 20.00 pack-year smoking history. He has never used smokeless tobacco. He reports current alcohol use of about 5.8 standard drinks of alcohol per week. He reports that he does not use drugs. Family History: family history includes Diabetes in his father; Heart Disease in his father; High Blood Pressure in his father and mother; Hypertension in his father and mother; Stroke in his mother. Review of Systems:   General: Denies fever, chills, night sweats, weight loss, malaise, fatigue  HEENT: Denies sore throat, sinus problems, allergic rhinosinusitis  Card: Denies chest pain, palpitations, orthopnea/PND. HTN. Pulm: Denies cough, shortness of breath, dyspnea on exertion  GI:  per HPI. : Denies polyuria, dysuria, hematuria  Endo: neg  Heme: neg  Psych: neg  Neuro: Denies h/o CVA, TIA  Skin: Denies rashes, ulcers  Musculoskeletal: no gross motor dysfunction.  OA    Allergies: Macario Parkinson [aspirin] and Chocolate    Current Meds:  Current Outpatient Medications:     Multiple Vitamins-Minerals (THERAPEUTIC MULTIVITAMIN-MINERALS) tablet, Take 1 tablet by mouth daily, Disp: , Rfl:     nicotine (NICODERM CQ) 21 MG/24HR, Place 1 patch onto the skin daily as needed (if patient is a smoker and requests nicotine replacemnt therapy), Disp: 30 patch, Rfl: 3    omeprazole (PRILOSEC) 40 MG delayed release capsule, Take 1 capsule by mouth every morning (before breakfast), Disp: 90 capsule, Rfl: 1    Physical Exam:  Vital signs and Nurse's note reviewed  Gen:  A&Ox3, NAD. Pleasant and cooperative. HEENT: PERRLA, EOMI, no scleral icterus  Neck:  no goiter  CVS: Regular rate and rhythm  Resp: Good bilateral air entry, no active wheezing, no labored breathing  Abd: soft, non-tender, non-distended, bowel sounds present.  Well healed hockey stick incision  Ext: Moves all extremities, no gross focal motor deficits  Skin: No erythema or ulcerations     Labs:   Lab Results   Component Value Date    WBC 7.8 08/07/2020    HGB 13.9 08/07/2020    HCT 41.8 08/07/2020    .5 08/07/2020     08/07/2020     Lab Results   Component Value Date     08/09/2020    K 4.6 08/09/2020     08/09/2020    CO2 26 08/09/2020    BUN 8 08/09/2020    CREATININE 0.62 08/09/2020    GLUCOSE 105 08/09/2020    CALCIUM 9.3 08/09/2020     Lab Results   Component Value Date    ALKPHOS 1,037 08/09/2020     08/09/2020     08/09/2020    PROT 6.7 08/09/2020    BILITOT 3.85 08/09/2020    BILIDIR 3.47 08/07/2020    LABALBU 3.6 08/09/2020     No results found for: LACTA  No results found for: AMYLASE  Lab Results   Component Value Date    LIPASE 54 08/06/2020     Lab Results   Component Value Date    INR 0.9 08/07/2020       Radiologic Studies:  EXAMINATION:    CT OF THE ABDOMEN AND PELVIS WITH CONTRAST, 8/4/2020 5:26 pm         TECHNIQUE:    CT of the abdomen and pelvis was performed with the administration of intravenous contrast. Multiplanar reformatted images are provided for review. Dose modulation, iterative reconstruction, and/or weight based adjustment of    the mA/kV was utilized to reduce the radiation dose to as low as reasonably    achievable.         COMPARISON:    None         HISTORY:    ORDERING SYSTEM PROVIDED HISTORY: Elevated liver enzymes    TECHNOLOGIST PROVIDED HISTORY:         FINDINGS:    Lower Chest: No acute infiltrate at the lung bases.         Organs: Mild hepatomegaly.  No significant cirrhotic changes are identified. There are multiple low-attenuation hepatic lesions most consistent with    simple cysts.  The largest in the left lobe anteriorly measures 5.0 cm. There is moderate intrahepatic biliary dilatation.  The common bile duct is    not dilated.  Increased attenuation of the gallbladder lumen with gallbladder    wall thickening and small calculi.  Mild splenomegaly.  The pancreas and    adrenal glands are unremarkable.  No renal mass or significant hydronephrosis.         GI/Bowel: No pericolonic inflammatory changes.  The appendix is unremarkable. There is no small bowel distension.  The stomach and duodenal sweep are    intact.         Pelvis: There is no pelvic mass or free pelvic fluid.  The prostate is    borderline in size.  Mild distention of the urinary bladder.         Peritoneum/Retroperitoneum: The abdominal aorta is normal in caliber with    mild calcified atherosclerotic plaque.  No significant retroperitoneal    adenopathy.  No upper abdominal ascites.         The splenic vein, portal vein and SMV are all patent.  Multiple prominent    upper abdominal varices as well as distal esophageal varices are noted.  The    umbilical vein is enlarged.         Bones/Soft Tissues: No acute osseous or soft tissue abnormality.  Small fat    containing umbilical hernia.  Degenerative disc disease at L5-S1.              Impression    1.  Gallbladder wall thickening with cholelithiasis and increased attenuation    of the gallbladder lumen.  Differential includes acute cholecystitis. 2. Intrahepatic biliary dilatation with nondilated common bile duct.  This    may be related to the adjacent inflammatory changes within the gallbladder. Alternatively, tumor such as a Klatskin's tumor are possibilities. 3. Portal hypertension with enlarged upper abdominal varices and distal    esophageal varices.  Mild splenomegaly.  No significant ascites. Hepatomegaly.  Multiple hepatic lesions most consistent with simple cysts. 4. No acute bowel pathology. Findings were discussed with Dr. Kady Sanchez at 7:05 pm on 8/4/2020.         RECOMMENDATIONS:    Recommend follow-up evaluation of the gallbladder with ultrasound. Additional evaluation of the biliary tract findings can be performed with    MRCP or ERCP.               Impressions/Recommendations:     Peripheral venous insufficiency. Chemotherapy access need. S/p extrahepatic bile duct resection for cholangiocarcinoma with cary-en-y hepaticojejunostomy x2 August 10,2020 by Dr. Rik Kaplan at [facility].    I talked to Dr. Rik Kaplan about this patient already in [facility], plan port. Will add him on for tomorrow since he starts chemo next week. Informed consent obtained for percutaneous port placement with C-arm fluoroscopy under MAC. Risks and benefits discussed, including but not limited to bleeding, infection, pneumothorax, catheter kinking/fracture, clot, etc. All questions answered.        Maryam Vazquez DO, MPH, [address] [place] [address] office [phone]  [place] office [phone]

## 2020-09-17 NOTE — PROGRESS NOTES
Patient instructed on the pre-operative, intra-operative, and post-operative process. Patient's surgery arrival time to the hospital and surgery start time confirmed for the day of surgery. Patient instructed on NPO status. Medication instructions and Pre operative instruction sheet reviewed over the phone. Instructed pt to take Prilosec with a small sip of water prior to arriving to the hospital the day of surgery.

## 2020-09-18 ENCOUNTER — HOSPITAL ENCOUNTER (OUTPATIENT)
Age: 60
Setting detail: OUTPATIENT SURGERY
Discharge: HOME | End: 2020-09-18
Attending: SURGERY | Admitting: SURGERY
Payer: COMMERCIAL

## 2020-09-18 ENCOUNTER — APPOINTMENT (OUTPATIENT)
Dept: GENERAL RADIOLOGY | Age: 60
End: 2020-09-18
Attending: SURGERY
Payer: COMMERCIAL

## 2020-09-18 ENCOUNTER — ANESTHESIA (OUTPATIENT)
Dept: OPERATING ROOM | Age: 60
End: 2020-09-18
Payer: COMMERCIAL

## 2020-09-18 VITALS
TEMPERATURE: 97 F | BODY MASS INDEX: 27.7 KG/M2 | HEART RATE: 88 BPM | HEIGHT: 69 IN | OXYGEN SATURATION: 98 % | DIASTOLIC BLOOD PRESSURE: 84 MMHG | RESPIRATION RATE: 16 BRPM | WEIGHT: 187 LBS | SYSTOLIC BLOOD PRESSURE: 133 MMHG

## 2020-09-18 VITALS
SYSTOLIC BLOOD PRESSURE: 113 MMHG | DIASTOLIC BLOOD PRESSURE: 73 MMHG | RESPIRATION RATE: 18 BRPM | TEMPERATURE: 96.8 F | OXYGEN SATURATION: 96 %

## 2020-09-18 PROCEDURE — 2500000003 HC RX 250 WO HCPCS: Performed by: SURGERY

## 2020-09-18 PROCEDURE — 7100000011 HC PHASE II RECOVERY - ADDTL 15 MIN: Performed by: SURGERY

## 2020-09-18 PROCEDURE — 71045 X-RAY EXAM CHEST 1 VIEW: CPT

## 2020-09-18 PROCEDURE — 3600000012 HC SURGERY LEVEL 2 ADDTL 15MIN: Performed by: SURGERY

## 2020-09-18 PROCEDURE — 2500000003 HC RX 250 WO HCPCS: Performed by: NURSE ANESTHETIST, CERTIFIED REGISTERED

## 2020-09-18 PROCEDURE — 6360000002 HC RX W HCPCS: Performed by: SURGERY

## 2020-09-18 PROCEDURE — 3700000000 HC ANESTHESIA ATTENDED CARE: Performed by: SURGERY

## 2020-09-18 PROCEDURE — C1788 PORT, INDWELLING, IMP: HCPCS | Performed by: SURGERY

## 2020-09-18 PROCEDURE — 2580000003 HC RX 258: Performed by: SURGERY

## 2020-09-18 PROCEDURE — 2580000003 HC RX 258: Performed by: NURSE ANESTHETIST, CERTIFIED REGISTERED

## 2020-09-18 PROCEDURE — 76000 FLUOROSCOPY <1 HR PHYS/QHP: CPT

## 2020-09-18 PROCEDURE — 7100000010 HC PHASE II RECOVERY - FIRST 15 MIN: Performed by: SURGERY

## 2020-09-18 PROCEDURE — 77001 FLUOROGUIDE FOR VEIN DEVICE: CPT | Performed by: SURGERY

## 2020-09-18 PROCEDURE — 3700000001 HC ADD 15 MINUTES (ANESTHESIA): Performed by: SURGERY

## 2020-09-18 PROCEDURE — 6360000002 HC RX W HCPCS: Performed by: NURSE ANESTHETIST, CERTIFIED REGISTERED

## 2020-09-18 PROCEDURE — 2709999900 HC NON-CHARGEABLE SUPPLY: Performed by: SURGERY

## 2020-09-18 PROCEDURE — 3600000002 HC SURGERY LEVEL 2 BASE: Performed by: SURGERY

## 2020-09-18 PROCEDURE — 36561 INSERT TUNNELED CV CATH: CPT | Performed by: SURGERY

## 2020-09-18 DEVICE — KIT INFUS PRT 8FR GROSH VLV INTMED CATHETER W SIL FILL SUT H: Type: IMPLANTABLE DEVICE | Site: CHEST | Status: FUNCTIONAL

## 2020-09-18 RX ORDER — HEPARIN SODIUM (PORCINE) LOCK FLUSH IV SOLN 100 UNIT/ML 100 UNIT/ML
SOLUTION INTRAVENOUS
Status: DISCONTINUED
Start: 2020-09-18 | End: 2020-09-18 | Stop reason: HOSPADM

## 2020-09-18 RX ORDER — SODIUM CHLORIDE, SODIUM LACTATE, POTASSIUM CHLORIDE, CALCIUM CHLORIDE 600; 310; 30; 20 MG/100ML; MG/100ML; MG/100ML; MG/100ML
INJECTION, SOLUTION INTRAVENOUS CONTINUOUS
Status: DISCONTINUED | OUTPATIENT
Start: 2020-09-18 | End: 2020-09-18 | Stop reason: HOSPADM

## 2020-09-18 RX ORDER — HEPARIN SODIUM 5000 [USP'U]/ML
INJECTION, SOLUTION INTRAVENOUS; SUBCUTANEOUS
Status: DISCONTINUED
Start: 2020-09-18 | End: 2020-09-18 | Stop reason: WASHOUT

## 2020-09-18 RX ORDER — PROPOFOL 10 MG/ML
INJECTION, EMULSION INTRAVENOUS CONTINUOUS PRN
Status: DISCONTINUED | OUTPATIENT
Start: 2020-09-18 | End: 2020-09-18 | Stop reason: SDUPTHER

## 2020-09-18 RX ORDER — LIDOCAINE HYDROCHLORIDE 20 MG/ML
INJECTION, SOLUTION EPIDURAL; INFILTRATION; INTRACAUDAL; PERINEURAL PRN
Status: DISCONTINUED | OUTPATIENT
Start: 2020-09-18 | End: 2020-09-18 | Stop reason: SDUPTHER

## 2020-09-18 RX ORDER — LIDOCAINE HYDROCHLORIDE AND EPINEPHRINE 10; 10 MG/ML; UG/ML
INJECTION, SOLUTION INFILTRATION; PERINEURAL PRN
Status: DISCONTINUED | OUTPATIENT
Start: 2020-09-18 | End: 2020-09-18 | Stop reason: ALTCHOICE

## 2020-09-18 RX ORDER — SODIUM CHLORIDE 9 MG/ML
INJECTION INTRAVENOUS PRN
Status: DISCONTINUED | OUTPATIENT
Start: 2020-09-18 | End: 2020-09-18 | Stop reason: SDUPTHER

## 2020-09-18 RX ORDER — PROPOFOL 10 MG/ML
INJECTION, EMULSION INTRAVENOUS PRN
Status: DISCONTINUED | OUTPATIENT
Start: 2020-09-18 | End: 2020-09-18 | Stop reason: SDUPTHER

## 2020-09-18 RX ORDER — KETAMINE HYDROCHLORIDE 100 MG/ML
INJECTION, SOLUTION INTRAMUSCULAR; INTRAVENOUS PRN
Status: DISCONTINUED | OUTPATIENT
Start: 2020-09-18 | End: 2020-09-18 | Stop reason: SDUPTHER

## 2020-09-18 RX ORDER — MIDAZOLAM HYDROCHLORIDE 1 MG/ML
INJECTION INTRAMUSCULAR; INTRAVENOUS PRN
Status: DISCONTINUED | OUTPATIENT
Start: 2020-09-18 | End: 2020-09-18 | Stop reason: SDUPTHER

## 2020-09-18 RX ADMIN — PROPOFOL 30 MG: 10 INJECTION, EMULSION INTRAVENOUS at 15:57

## 2020-09-18 RX ADMIN — LIDOCAINE HYDROCHLORIDE 60 MG: 20 INJECTION, SOLUTION EPIDURAL; INFILTRATION; INTRACAUDAL; PERINEURAL at 15:57

## 2020-09-18 RX ADMIN — PROPOFOL 200 MCG/KG/MIN: 10 INJECTION, EMULSION INTRAVENOUS at 15:32

## 2020-09-18 RX ADMIN — PROPOFOL 20 MG: 10 INJECTION, EMULSION INTRAVENOUS at 16:01

## 2020-09-18 RX ADMIN — SODIUM CHLORIDE 9 ML: 9 INJECTION, SOLUTION INTRAMUSCULAR; INTRAVENOUS; SUBCUTANEOUS at 15:33

## 2020-09-18 RX ADMIN — MIDAZOLAM 2 MG: 1 INJECTION INTRAMUSCULAR; INTRAVENOUS at 15:32

## 2020-09-18 RX ADMIN — KETAMINE HYDROCHLORIDE 25 MG: 100 INJECTION INTRAMUSCULAR; INTRAVENOUS at 15:32

## 2020-09-18 RX ADMIN — PROPOFOL 20 MG: 10 INJECTION, EMULSION INTRAVENOUS at 15:43

## 2020-09-18 RX ADMIN — DEXTROSE MONOHYDRATE 2 G: 50 INJECTION, SOLUTION INTRAVENOUS at 15:26

## 2020-09-18 RX ADMIN — PROPOFOL 20 MG: 10 INJECTION, EMULSION INTRAVENOUS at 15:34

## 2020-09-18 RX ADMIN — SODIUM CHLORIDE 9 ML: 9 INJECTION, SOLUTION INTRAMUSCULAR; INTRAVENOUS; SUBCUTANEOUS at 15:32

## 2020-09-18 RX ADMIN — SODIUM CHLORIDE, POTASSIUM CHLORIDE, SODIUM LACTATE AND CALCIUM CHLORIDE: 600; 310; 30; 20 INJECTION, SOLUTION INTRAVENOUS at 10:53

## 2020-09-18 RX ADMIN — LIDOCAINE HYDROCHLORIDE 100 MG: 20 INJECTION, SOLUTION EPIDURAL; INFILTRATION; INTRACAUDAL; PERINEURAL at 15:32

## 2020-09-18 ASSESSMENT — PAIN SCALES - GENERAL
PAINLEVEL_OUTOF10: 0

## 2020-09-18 ASSESSMENT — PAIN - FUNCTIONAL ASSESSMENT: PAIN_FUNCTIONAL_ASSESSMENT: 0-10

## 2020-09-18 ASSESSMENT — LIFESTYLE VARIABLES: SMOKING_STATUS: 1

## 2020-09-18 NOTE — PROGRESS NOTES
Discharge instructions given to patient and wife, verbalized understanding, no further questions at this time. Pt stated readiness for discharge. Discharge Criteria    Inpatients must meet Criteria 1 through 7. All other patients are either YES or N/A. If a NO is chosen then Anesthesia or Surgeon must be notified. 1.  Minimum 30 minutes after last dose of sedative medication, minimum 120 minutes after last dose of reversal agent. Yes      2. Systolic BP stable within 20 mmHg for 30 minutes & systolic BP between 90 & 065 or within 10 mmHg of baseline. Yes      3. Pulse between 60 and 100 or within 10 bpm of baseline. Yes      4. Spontaneous respiratory rate >/= 10 per minute. Yes      5. SaO2 >/= 95 or  >/= baseline. Yes      6. Able to cough and swallow or return to baseline function. Yes      7. Alert and oriented or return to baseline mental status. Yes      8. Demonstrates controlled, coordinated movements, ambulates with steady gait, or return to baseline activity function. Yes      9. Minimal or no pain or nausea, or at a level tolerable and acceptable to patient. Yes      10. Takes and retains oral fluids as allowed. Yes      11. Procedural / perioperative site stable. Minimal or no bleeding. Yes          12. If GI endoscopy procedure, minimal or no abdominal distention or passing flatus. N/A      13. Written discharge instructions and emergency telephone number provided. Yes      14. Accompanied by a responsible adult.     Yes

## 2020-09-18 NOTE — ANESTHESIA PRE PROCEDURE
Department of Anesthesiology  Preprocedure Note       Name:  Luisito Hugo   Age:  64 y.o.  :  1960                                          MRN:  [id number]         Date:  2020      Surgeon: Keturah Nick):  Hafsa Castillo DO    Procedure: Procedure(s):  PORT INSERTION, SUBCLAVIAN, W/C-ARM    Medications prior to admission:   Prior to Admission medications    Medication Sig Start Date End Date Taking? Authorizing Provider   Multiple Vitamins-Minerals (THERAPEUTIC MULTIVITAMIN-MINERALS) tablet Take 1 tablet by mouth daily   Yes Historical Provider, MD   nicotine (NICODERM CQ) 21 MG/24HR Place 1 patch onto the skin daily as needed (if patient is a smoker and requests nicotine replacemnt therapy) 20  Yes Nicholas Khan MD   omeprazole (PRILOSEC) 40 MG delayed release capsule Take 1 capsule by mouth every morning (before breakfast) 20  Yes JUAN CARLOS Escamilla - CNP       Current medications:    Current Facility-Administered Medications   Medication Dose Route Frequency Provider Last Rate Last Dose    ceFAZolin (ANCEF) 2 g in dextrose 5 % 100 mL IVPB  2 g Intravenous Once Yuliya I Nazemi, DO        lactated ringers infusion   Intravenous Continuous Yuliya I Nazemi,  mL/hr at 20 1053      lactated ringers infusion   Intravenous Continuous Yuliya I Nazemi, DO           Allergies:     Allergies   Allergen Reactions    Asa [Aspirin]     Chocolate Swelling       Problem List:    Patient Active Problem List   Diagnosis Code    Positive FIT (fecal immunochemical test) R19.5    Cholecystitis K81.9    Gastroesophageal reflux disease without esophagitis K21.9    Alcohol use Z72.89    Portal hypertension ([facility] [address] K76.6    Esophageal varices without bleeding (HCC) I85.00    Intrahepatic bile duct dilation K83.8    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[facility] [address] C24.0    Elevated bilirubin R17    Elevated CA 19-9 level R97.8    Alcohol abuse F10.10       Past Medical History:        Diagnosis Date    Anxiety  Chronic back pain     Depression     Gastroesophageal reflux disease without esophagitis 8/6/2020    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[address] 8/7/2020       Past Surgical History:        Procedure Laterality Date    ERCP  08/07/2020    stent insertion, cholangiopancreatography  with brushings.  ERCP  8/7/2020    ERCP STENT INSERTION performed by Estella Bell MD at [address] History:    Social History     Tobacco Use    Smoking status: Current Every Day Smoker     Packs/day: 0.25     Years: 20.00     Pack years: 5.00     Types: Cigarettes    Smokeless tobacco: Never Used   Substance Use Topics    Alcohol use: Yes     Alcohol/week: 5.8 standard drinks     Types: 7 Standard drinks or equivalent per week     Comment: Occ                                Ready to quit: Not Answered  Counseling given: Not Answered      Vital Signs (Current):   Vitals:    09/17/20 1243 09/18/20 1047   BP:  (!) 147/90   Pulse:  96   Resp:  18   Temp:  36.8 °C (98.3 °F)   TempSrc:  Temporal   SpO2:  99%   Weight: 187 lb (84.8 kg) 187 lb (84.8 kg)   Height: 5' 9\" (1.753 m) 5' 9\" (1.753 m)                                              BP Readings from Last 3 Encounters:   09/18/20 (!) 147/90   09/17/20 130/85   08/09/20 (!) 131/95       NPO Status: Time of last liquid consumption: 2130                        Time of last solid consumption: 2130                        Date of last liquid consumption: 09/17/20                        Date of last solid food consumption: 09/17/20    BMI:   Wt Readings from Last 3 Encounters:   09/18/20 187 lb (84.8 kg)   09/17/20 187 lb 14.4 oz (85.2 kg)   08/08/20 195 lb (88.5 kg)     Body mass index is 27.62 kg/m².     CBC:   Lab Results   Component Value Date    WBC 7.8 08/07/2020    RBC 4.00 08/07/2020    HGB 13.9 08/07/2020    HCT 41.8 08/07/2020    .5 08/07/2020    RDW 12.7 08/07/2020     08/07/2020       CMP:   Lab Results   Component Value Date     08/09/2020    K 4.6 08/09/2020     08/09/2020    CO2 26 08/09/2020    BUN 8 08/09/2020    CREATININE 0.62 08/09/2020    GFRAA >60 08/09/2020    LABGLOM >60 08/09/2020    GLUCOSE 105 08/09/2020    PROT 6.7 08/09/2020    CALCIUM 9.3 08/09/2020    BILITOT 3.85 08/09/2020    ALKPHOS 1,037 08/09/2020     08/09/2020     08/09/2020       POC Tests: No results for input(s): POCGLU, POCNA, POCK, POCCL, POCBUN, POCHEMO, POCHCT in the last 72 hours. Coags:   Lab Results   Component Value Date    PROTIME 9.8 08/07/2020    INR 0.9 08/07/2020       HCG (If Applicable): No results found for: PREGTESTUR, PREGSERUM, HCG, HCGQUANT     ABGs: No results found for: PHART, PO2ART, PLZ7YLU, CEC3ECY, BEART, T7DOORTH     Type & Screen (If Applicable):  No results found for: LABABO, LABRH    Drug/Infectious Status (If Applicable):  Lab Results   Component Value Date    HEPCAB NONREACTIVE 08/04/2020       COVID-19 Screening (If Applicable):   Lab Results   Component Value Date    COVID19 Not Detected 08/06/2020         Anesthesia Evaluation   no history of anesthetic complications:   Airway: Mallampati: I  TM distance: >3 FB   Neck ROM: full  Mouth opening: > = 3 FB Dental:    (+) upper dentures and lower dentures      Pulmonary:normal exam    (+) current smoker    (-) recent URI          Patient did not smoke on day of surgery. Cardiovascular:  Exercise tolerance: good (>4 METS),                     Neuro/Psych:   (+) depression/anxiety             GI/Hepatic/Renal:   (+) GERD: well controlled, liver disease:,           Endo/Other:    (+) malignancy/cancer. Abdominal:           Vascular:                                        Anesthesia Plan      general and TIVA     ASA 3       Induction: intravenous. MIPS: Postoperative opioids intended and Prophylactic antiemetics administered. Anesthetic plan and risks discussed with patient.                       Virginia Lambert, JUAN CARLOS - CRNA 9/18/2020

## 2020-09-18 NOTE — BRIEF OP NOTE
Brief Postoperative Note      Patient: Nury Lux  YOB: 1960  MRN: Gabrielle Hewitt Kamla, APRN - CNP      Date of Procedure: 9/18/2020    Pre-Op Diagnosis: peripheral venous insufficiency. Chemotherapy access need.     Post-Op Diagnosis: Same       Surgery: percutaneous left subclavian tunneled subcutaneous Power port placement with C-arm fluoroscopy    Surgeon(s):  Yuliya Cage DO    Anesthesia: MAC    Estimated Blood Loss (mL): 10 ml    Complications: None    Specimens: none    Electronically signed by Yandy Mullins DO, FACOS, FACS on 9/18/2020 at 2:18 PM

## 2020-09-18 NOTE — H&P
GENERAL SURGERY H&P        Patient's Name/ Date of Birth/ Gender: Nury Lux / 1960 (64 y.o.) / male      PCP: JUAN CARLOS Whalen CNP  Referring: [facility]     History of present Illness:  Patient is a pleasant 64 y.o. male  S/p extrahepatic bile duct resection for cholangiocarcinoma with cary-en-y hepaticojejunostomy x2 August 10,2020 by Dr. Navi Harper at [facility]. Now needs port. Planning chemo initiation next week in [place]. He opted not to go to [facility] for a second opinion.         Past Medical History:  has a past medical history of Anxiety, Chronic back pain, Depression, Gastroesophageal reflux disease without esophagitis, and 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[address]    Past Surgical History:   Past Surgical History         Past Surgical History:   Procedure Laterality Date    ERCP   08/07/2020     stent insertion, cholangiopancreatography  with brushings.  ERCP   8/7/2020     ERCP STENT INSERTION performed by Brian Jones MD at [address] History:  reports that he has been smoking cigarettes. He has a 20.00 pack-year smoking history. He has never used smokeless tobacco. He reports current alcohol use of about 5.8 standard drinks of alcohol per week. He reports that he does not use drugs.     Family History: family history includes Diabetes in his father; Heart Disease in his father; High Blood Pressure in his father and mother; Hypertension in his father and mother; Stroke in his mother.     Review of Systems:   General: Denies fever, chills, night sweats, weight loss, malaise, fatigue  HEENT: Denies sore throat, sinus problems, allergic rhinosinusitis  Card: Denies chest pain, palpitations, orthopnea/PND. HTN. Pulm: Denies cough, shortness of breath, dyspnea on exertion  GI:  per HPI.   : Denies polyuria, dysuria, hematuria  Endo: neg  Heme: neg  Psych: neg  Neuro: Denies h/o CVA, TIA  Skin: Denies rashes, ulcers  Musculoskeletal: no gross motor dysfunction. OA     Allergies: Asa [aspirin] and Chocolate     Current Meds:  Current Medication   Current Outpatient Medications:     Multiple Vitamins-Minerals (THERAPEUTIC MULTIVITAMIN-MINERALS) tablet, Take 1 tablet by mouth daily, Disp: , Rfl:     nicotine (NICODERM CQ) 21 MG/24HR, Place 1 patch onto the skin daily as needed (if patient is a smoker and requests nicotine replacemnt therapy), Disp: 30 patch, Rfl: 3    omeprazole (PRILOSEC) 40 MG delayed release capsule, Take 1 capsule by mouth every morning (before breakfast), Disp: 90 capsule, Rfl: 1        Physical Exam:  Vital signs and Nurse's note reviewed  Gen:  A&Ox3, NAD. Pleasant and cooperative. HEENT: PERRLA, EOMI, no scleral icterus  Neck:  no goiter  CVS: Regular rate and rhythm  Resp: Good bilateral air entry, no active wheezing, no labored breathing  Abd: soft, non-tender, non-distended, bowel sounds present.  Well healed hockey stick incision  Ext: Moves all extremities, no gross focal motor deficits  Skin: No erythema or ulcerations      Labs:         Lab Results   Component Value Date     WBC 7.8 08/07/2020     HGB 13.9 08/07/2020     HCT 41.8 08/07/2020     .5 08/07/2020      08/07/2020           Lab Results   Component Value Date      08/09/2020     K 4.6 08/09/2020      08/09/2020     CO2 26 08/09/2020     BUN 8 08/09/2020     CREATININE 0.62 08/09/2020     GLUCOSE 105 08/09/2020     CALCIUM 9.3 08/09/2020           Lab Results   Component Value Date     ALKPHOS 1,037 08/09/2020      08/09/2020      08/09/2020     PROT 6.7 08/09/2020     BILITOT 3.85 08/09/2020     BILIDIR 3.47 08/07/2020     LABALBU 3.6 08/09/2020     No results found for: LACTA  No results found for: AMYLASE        Lab Results   Component Value Date     LIPASE 54 08/06/2020           Lab Results   Component Value Date     INR 0.9 08/07/2020        Radiologic Studies:  EXAMINATION:    CT OF THE ABDOMEN AND PELVIS WITH CONTRAST, 8/4/2020 5:26 pm         TECHNIQUE:    CT of the abdomen and pelvis was performed with the administration of    intravenous contrast. Multiplanar reformatted images are provided for review. Dose modulation, iterative reconstruction, and/or weight based adjustment of    the mA/kV was utilized to reduce the radiation dose to as low as reasonably    achievable.         COMPARISON:    None         HISTORY:    ORDERING SYSTEM PROVIDED HISTORY: Elevated liver enzymes    TECHNOLOGIST PROVIDED HISTORY:         FINDINGS:    Lower Chest: No acute infiltrate at the lung bases.         Organs: Mild hepatomegaly.  No significant cirrhotic changes are identified. There are multiple low-attenuation hepatic lesions most consistent with    simple cysts.  The largest in the left lobe anteriorly measures 5.0 cm. There is moderate intrahepatic biliary dilatation.  The common bile duct is    not dilated.  Increased attenuation of the gallbladder lumen with gallbladder    wall thickening and small calculi.  Mild splenomegaly.  The pancreas and    adrenal glands are unremarkable.  No renal mass or significant hydronephrosis.         GI/Bowel: No pericolonic inflammatory changes.  The appendix is unremarkable. There is no small bowel distension.  The stomach and duodenal sweep are    intact.         Pelvis:  There is no pelvic mass or free pelvic fluid.  The prostate is    borderline in size.  Mild distention of the urinary bladder.         Peritoneum/Retroperitoneum: The abdominal aorta is normal in caliber with    mild calcified atherosclerotic plaque.  No significant retroperitoneal    adenopathy.  No upper abdominal ascites.         The splenic vein, portal vein and SMV are all patent.  Multiple prominent    upper abdominal varices as well as distal esophageal varices are noted.  The    umbilical vein is enlarged.         Bones/Soft Tissues: No acute osseous or soft tissue abnormality.  Small fat    containing umbilical hernia.  Degenerative disc disease at L5-S1.              Impression    1. Gallbladder wall thickening with cholelithiasis and increased attenuation    of the gallbladder lumen.  Differential includes acute cholecystitis. 2. Intrahepatic biliary dilatation with nondilated common bile duct.  This    may be related to the adjacent inflammatory changes within the gallbladder. Alternatively, tumor such as a Klatskin's tumor are possibilities. 3. Portal hypertension with enlarged upper abdominal varices and distal    esophageal varices.  Mild splenomegaly.  No significant ascites. Hepatomegaly.  Multiple hepatic lesions most consistent with simple cysts. 4. No acute bowel pathology. Findings were discussed with Dr. Taran Sanchez at 7:05 pm on 8/4/2020.         RECOMMENDATIONS:    Recommend follow-up evaluation of the gallbladder with ultrasound. Additional evaluation of the biliary tract findings can be performed with    MRCP or ERCP.               Impressions/Recommendations:      Peripheral venous insufficiency. Chemotherapy access need.     S/p extrahepatic bile duct resection for cholangiocarcinoma with cary-en-y hepaticojejunostomy x2 August 10,2020 by Dr. Nina Puga at [facility].     I talked to Dr. Nina Puga about this patient already in [place], plan port. Will add him on for tomorrow since he starts chemo next week. Informed consent obtained for percutaneous port placement with C-arm fluoroscopy under MAC. Risks and benefits discussed, including but not limited to bleeding, infection, pneumothorax, catheter kinking/fracture, clot, etc. All questions answered.       H&P  General Surgery        Pt Name: Hussein Bernard  MRN: [id number]  YOB: 1960  Date of evaluation: 9/18/2020      [x] I have examined the patient and reviewed the H&P/Consult completed, and there are no changes to the patient or plans.      [] I have examined the patient and reviewed the H&P/Consult and have noted the following changes: The patient was counseled at length about the risks of bunny Covid-19 during their perioperative period and any recovery window from their procedure. The patient was made aware that bunny Covid-19  may worsen their prognosis for recovering from their procedure  and lend to a higher morbidity and/or mortality risk. All material risks, benefits, and reasonable alternatives including postponing the procedure were discussed. The patient does wish to proceed with the procedure at this time.         Electronically signed by Theresa Ramirez DO  on 9/18/2020 at 2:41 PM

## 2020-09-18 NOTE — OP NOTE
Operative Note      Patient: Darryle Pinion  YOB: 1960  MRN: Emiliana Wisdom, APRN - CNP      Date of Procedure: 9/18/2020    Pre-Op Diagnosis: peripheral venous insufficiency. Chemotherapy access need. Post-Op Diagnosis: Same       Surgery: percutaneous left subclavian tunneled subcutaneous Power port placement with C-arm fluoroscopy    Surgeon(s):  Yuliya Cage DO    Anesthesia: MAC    Estimated Blood Loss (mL): 10 ml    Complications: None    Specimens: none    Procedure details:    Informed consent obtained, risks and benefits discussed in detail, all questions answered. Please see H&P for indications. Pre-op IV antibiotics administered. Patient brought to OR suite, positioned, prepped, draped in sterile fashion. Placed under MAC. Timeout called, procedure confirmed. Patient placed in Trendelenberg, local anesthetic used. The left subclavian vein is easily cannulated, dark red nonpulsatile blood is confirmed upon removal of the syringe,  guidewire is fed through smoothly without resistance, and C-arm fluoroscopy confirms proper pathway. Stab incision with 11 blade at entry site done. Dilator/peel-away sheath complex advanced over guidewire without difficulty and under C-arm monitoring. The guide wire and dilator are removed leaving the sheath in the vein. The pre-flushed 8 Nepali catheter is inserted through the sheath, positioned appropriately under C-arm, then the sheath is peeled away carefully while the catheter stays. Then catheter tip is then positioned under fluoroscopy. Additional local injected used to create the port pocket of adequate depth, hemostasis maintained. The catheter is tunneled inferiorly, locking hub device placed proximally, excess catheter trimmed off, port connection made and locked into place. Once this was completed, the port was flushed with heparinized saline, easily aspirating and flushing.  The port was secured to underlying fascia with 3-0 prolene to prevent migration and twisting. The port was re-tested through the skin and noted to again easily aspirate and flush, again putting a few more ml of heparinized heplock concentration saline through the port with the straight Monte needle. The wound and port are irrigated with sterile saline. Wound is closed in layers with absorbable suture and glue. Patient tolerated the procedure well. I spoke with family afterwards. Discharge instructions/scripts provided. Follow up prn. Routine portable CXR post-procedure was obtained and reviewed, good placement confirmed, no pneumothorax. The port may be used at anytime.        Electronically signed by Rosa Isela Au DO, FACOS, FACS on 9/18/2020 at 2:18 PM

## 2020-11-07 ENCOUNTER — HOSPITAL ENCOUNTER (EMERGENCY)
Age: 60
Discharge: HOME | End: 2020-11-08
Attending: EMERGENCY MEDICINE
Payer: COMMERCIAL

## 2020-11-07 ENCOUNTER — APPOINTMENT (OUTPATIENT)
Dept: GENERAL RADIOLOGY | Age: 60
End: 2020-11-07
Payer: COMMERCIAL

## 2020-11-07 LAB
ABSOLUTE EOS #: 0.23 K/UL (ref 0–0.44)
ABSOLUTE IMMATURE GRANULOCYTE: 0.03 K/UL (ref 0–0.3)
ABSOLUTE LYMPH #: 1.49 K/UL (ref 1.1–3.7)
ABSOLUTE MONO #: 0.1 K/UL (ref 0.1–1.2)
ALBUMIN SERPL-MCNC: 3.2 G/DL (ref 3.5–5.2)
ALBUMIN/GLOBULIN RATIO: 1.2 (ref 1–2.5)
ALLEN TEST: ABNORMAL
ALP BLD-CCNC: 80 U/L (ref 40–129)
ALT SERPL-CCNC: 78 U/L (ref 5–41)
ANION GAP SERPL CALCULATED.3IONS-SCNC: 9 MMOL/L (ref 9–17)
AST SERPL-CCNC: 55 U/L
BASOPHILS # BLD: 0 % (ref 0–2)
BASOPHILS ABSOLUTE: <0.03 K/UL (ref 0–0.2)
BILIRUB SERPL-MCNC: 0.29 MG/DL (ref 0.3–1.2)
BNP INTERPRETATION: NORMAL
BUN BLDV-MCNC: 14 MG/DL (ref 6–20)
BUN/CREAT BLD: 23 (ref 9–20)
CALCIUM SERPL-MCNC: 8.3 MG/DL (ref 8.6–10.4)
CARBOXYHEMOGLOBIN: ABNORMAL % (ref 0–5)
CHLORIDE BLD-SCNC: 103 MMOL/L (ref 98–107)
CO2: 23 MMOL/L (ref 20–31)
CREAT SERPL-MCNC: 0.6 MG/DL (ref 0.7–1.2)
D-DIMER QUANTITATIVE: 0.77 MG/L FEU (ref 0–0.59)
DIFFERENTIAL TYPE: ABNORMAL
EOSINOPHILS RELATIVE PERCENT: 3 % (ref 1–4)
FIO2: ABNORMAL
GFR AFRICAN AMERICAN: >60 ML/MIN
GFR NON-AFRICAN AMERICAN: >60 ML/MIN
GFR SERPL CREATININE-BSD FRML MDRD: ABNORMAL ML/MIN/{1.73_M2}
GFR SERPL CREATININE-BSD FRML MDRD: ABNORMAL ML/MIN/{1.73_M2}
GLUCOSE BLD-MCNC: 122 MG/DL (ref 70–99)
HCO3 VENOUS: 24 MMOL/L (ref 24–30)
HCT VFR BLD CALC: 31.3 % (ref 40.7–50.3)
HEMOGLOBIN: 10.5 G/DL (ref 13–17)
IMMATURE GRANULOCYTES: 0 %
LACTIC ACID, WHOLE BLOOD: NORMAL MMOL/L (ref 0.7–2.1)
LACTIC ACID: 0.6 MMOL/L (ref 0.5–2.2)
LYMPHOCYTES # BLD: 17 % (ref 24–43)
MAGNESIUM: 2 MG/DL (ref 1.6–2.6)
MCH RBC QN AUTO: 33.9 PG (ref 25.2–33.5)
MCHC RBC AUTO-ENTMCNC: 33.5 G/DL (ref 28.4–34.8)
MCV RBC AUTO: 101 FL (ref 82.6–102.9)
METHEMOGLOBIN: ABNORMAL % (ref 0–1.9)
MODE: ABNORMAL
MONOCYTES # BLD: 1 % (ref 3–12)
NEGATIVE BASE EXCESS, VEN: ABNORMAL MMOL/L (ref 0–2)
NOTIFICATION TIME: ABNORMAL
NOTIFICATION: ABNORMAL
NRBC AUTOMATED: 0 PER 100 WBC
O2 DEVICE/FLOW/%: ABNORMAL
O2 SAT, VEN: 91.7 % (ref 60–85)
OXYHEMOGLOBIN: ABNORMAL % (ref 95–98)
PATIENT TEMP: ABNORMAL
PCO2, VEN, TEMP ADJ: ABNORMAL MMHG (ref 39–55)
PCO2, VEN: 34.5 (ref 39–55)
PDW BLD-RTO: 16.3 % (ref 11.8–14.4)
PEEP/CPAP: ABNORMAL
PH VENOUS: 7.46 (ref 7.32–7.42)
PH, VEN, TEMP ADJ: ABNORMAL (ref 7.32–7.42)
PLATELET # BLD: 135 K/UL (ref 138–453)
PLATELET ESTIMATE: ABNORMAL
PMV BLD AUTO: 10.1 FL (ref 8.1–13.5)
PO2, VEN, TEMP ADJ: ABNORMAL MMHG (ref 30–50)
PO2, VEN: 58 (ref 30–50)
POSITIVE BASE EXCESS, VEN: 0.7 MMOL/L (ref 0–2)
POTASSIUM SERPL-SCNC: 3.9 MMOL/L (ref 3.7–5.3)
PRO-BNP: 198 PG/ML
PSV: ABNORMAL
PT. POSITION: ABNORMAL
RBC # BLD: 3.1 M/UL (ref 4.21–5.77)
RBC # BLD: ABNORMAL 10*6/UL
RESPIRATORY RATE: ABNORMAL
SAMPLE SITE: ABNORMAL
SEDIMENTATION RATE, ERYTHROCYTE: 28 MM (ref 0–20)
SEG NEUTROPHILS: 79 % (ref 36–65)
SEGMENTED NEUTROPHILS ABSOLUTE COUNT: 6.72 K/UL (ref 1.5–8.1)
SET RATE: ABNORMAL
SODIUM BLD-SCNC: 135 MMOL/L (ref 135–144)
TEXT FOR RESPIRATORY: ABNORMAL
TOTAL HB: ABNORMAL G/DL (ref 12–16)
TOTAL PROTEIN: 5.9 G/DL (ref 6.4–8.3)
TOTAL RATE: ABNORMAL
TROPONIN INTERP: NORMAL
TROPONIN T: NORMAL NG/ML
TROPONIN, HIGH SENSITIVITY: 10 NG/L (ref 0–22)
VT: ABNORMAL
WBC # BLD: 8.6 K/UL (ref 3.5–11.3)
WBC # BLD: ABNORMAL 10*3/UL

## 2020-11-07 PROCEDURE — 85651 RBC SED RATE NONAUTOMATED: CPT

## 2020-11-07 PROCEDURE — 71045 X-RAY EXAM CHEST 1 VIEW: CPT

## 2020-11-07 PROCEDURE — 96367 TX/PROPH/DG ADDL SEQ IV INF: CPT

## 2020-11-07 PROCEDURE — 93005 ELECTROCARDIOGRAM TRACING: CPT | Performed by: EMERGENCY MEDICINE

## 2020-11-07 PROCEDURE — 80053 COMPREHEN METABOLIC PANEL: CPT

## 2020-11-07 PROCEDURE — 99283 EMERGENCY DEPT VISIT LOW MDM: CPT

## 2020-11-07 PROCEDURE — 82805 BLOOD GASES W/O2 SATURATION: CPT

## 2020-11-07 PROCEDURE — 96372 THER/PROPH/DIAG INJ SC/IM: CPT

## 2020-11-07 PROCEDURE — 96365 THER/PROPH/DIAG IV INF INIT: CPT

## 2020-11-07 PROCEDURE — 84484 ASSAY OF TROPONIN QUANT: CPT

## 2020-11-07 PROCEDURE — 83880 ASSAY OF NATRIURETIC PEPTIDE: CPT

## 2020-11-07 PROCEDURE — 85379 FIBRIN DEGRADATION QUANT: CPT

## 2020-11-07 PROCEDURE — 83605 ASSAY OF LACTIC ACID: CPT

## 2020-11-07 PROCEDURE — 83735 ASSAY OF MAGNESIUM: CPT

## 2020-11-07 PROCEDURE — 87040 BLOOD CULTURE FOR BACTERIA: CPT

## 2020-11-07 PROCEDURE — 6360000002 HC RX W HCPCS: Performed by: EMERGENCY MEDICINE

## 2020-11-07 PROCEDURE — 36415 COLL VENOUS BLD VENIPUNCTURE: CPT

## 2020-11-07 PROCEDURE — 2580000003 HC RX 258: Performed by: EMERGENCY MEDICINE

## 2020-11-07 PROCEDURE — 85025 COMPLETE CBC W/AUTO DIFF WBC: CPT

## 2020-11-07 RX ORDER — SODIUM CHLORIDE 0.9 % (FLUSH) 0.9 %
10 SYRINGE (ML) INJECTION PRN
Status: DISCONTINUED | OUTPATIENT
Start: 2020-11-07 | End: 2020-11-08 | Stop reason: HOSPADM

## 2020-11-07 RX ORDER — SODIUM CHLORIDE 0.9 % (FLUSH) 0.9 %
10 SYRINGE (ML) INJECTION EVERY 12 HOURS SCHEDULED
Status: DISCONTINUED | OUTPATIENT
Start: 2020-11-07 | End: 2020-11-08 | Stop reason: HOSPADM

## 2020-11-07 RX ORDER — PROCHLORPERAZINE MALEATE 10 MG
10 TABLET ORAL EVERY 6 HOURS PRN
COMMUNITY
End: 2021-09-24 | Stop reason: ALTCHOICE

## 2020-11-07 RX ADMIN — ENOXAPARIN SODIUM 90 MG: 100 INJECTION SUBCUTANEOUS at 23:37

## 2020-11-07 RX ADMIN — PIPERACILLIN AND TAZOBACTAM 4.5 G: 4; .5 INJECTION, POWDER, LYOPHILIZED, FOR SOLUTION INTRAVENOUS at 21:44

## 2020-11-07 RX ADMIN — VANCOMYCIN HYDROCHLORIDE 2250 MG: 1 INJECTION, POWDER, LYOPHILIZED, FOR SOLUTION INTRAVENOUS at 23:08

## 2020-11-07 ASSESSMENT — PAIN DESCRIPTION - INTENSITY: RATING_2: 5

## 2020-11-07 ASSESSMENT — PAIN DESCRIPTION - ORIENTATION
ORIENTATION: RIGHT
ORIENTATION: LEFT

## 2020-11-07 ASSESSMENT — PAIN - FUNCTIONAL ASSESSMENT
PAIN_FUNCTIONAL_ASSESSMENT: PREVENTS OR INTERFERES WITH MANY ACTIVE NOT PASSIVE ACTIVITIES
PAIN_FUNCTIONAL_ASSESSMENT: PREVENTS OR INTERFERES WITH MANY ACTIVE NOT PASSIVE ACTIVITIES

## 2020-11-07 ASSESSMENT — PAIN DESCRIPTION - PROGRESSION
CLINICAL_PROGRESSION: NOT CHANGED
CLINICAL_PROGRESSION: NOT CHANGED

## 2020-11-07 ASSESSMENT — PAIN DESCRIPTION - PAIN TYPE
TYPE: ACUTE PAIN
TYPE: ACUTE PAIN

## 2020-11-07 ASSESSMENT — PAIN SCALES - GENERAL
PAINLEVEL_OUTOF10: 5
PAINLEVEL_OUTOF10: 5

## 2020-11-07 ASSESSMENT — PAIN DESCRIPTION - FREQUENCY
FREQUENCY: CONTINUOUS
FREQUENCY: CONTINUOUS

## 2020-11-07 ASSESSMENT — PAIN DESCRIPTION - LOCATION
LOCATION: LEG
LOCATION: LEG

## 2020-11-07 ASSESSMENT — PAIN DESCRIPTION - ONSET: ONSET: SUDDEN

## 2020-11-07 ASSESSMENT — PAIN DESCRIPTION - DESCRIPTORS
DESCRIPTORS: ACHING
DESCRIPTORS: ACHING

## 2020-11-08 ENCOUNTER — HOSPITAL ENCOUNTER (OUTPATIENT)
Age: 60
Discharge: HOME | End: 2020-11-10
Payer: COMMERCIAL

## 2020-11-08 ENCOUNTER — HOSPITAL ENCOUNTER (OUTPATIENT)
Dept: VASCULAR LAB | Age: 60
Discharge: HOME | End: 2020-11-10
Payer: COMMERCIAL

## 2020-11-08 VITALS
SYSTOLIC BLOOD PRESSURE: 127 MMHG | BODY MASS INDEX: 29.18 KG/M2 | HEART RATE: 78 BPM | TEMPERATURE: 97.9 F | OXYGEN SATURATION: 96 % | HEIGHT: 69 IN | WEIGHT: 197 LBS | DIASTOLIC BLOOD PRESSURE: 65 MMHG | RESPIRATION RATE: 18 BRPM

## 2020-11-08 LAB
EKG ATRIAL RATE: 89 BPM
EKG P AXIS: 58 DEGREES
EKG P-R INTERVAL: 152 MS
EKG Q-T INTERVAL: 382 MS
EKG QRS DURATION: 98 MS
EKG QTC CALCULATION (BAZETT): 464 MS
EKG R AXIS: 82 DEGREES
EKG T AXIS: 52 DEGREES
EKG VENTRICULAR RATE: 89 BPM

## 2020-11-08 PROCEDURE — 93970 EXTREMITY STUDY: CPT

## 2020-11-08 PROCEDURE — 93010 ELECTROCARDIOGRAM REPORT: CPT | Performed by: INTERNAL MEDICINE

## 2020-11-08 RX ORDER — LEVOFLOXACIN 500 MG/1
500 TABLET, FILM COATED ORAL DAILY
Qty: 7 TABLET | Refills: 0 | Status: SHIPPED | OUTPATIENT
Start: 2020-11-08 | End: 2020-11-12 | Stop reason: ALTCHOICE

## 2020-11-08 ASSESSMENT — ENCOUNTER SYMPTOMS
TROUBLE SWALLOWING: 0
ABDOMINAL DISTENTION: 0
CONSTIPATION: 0
EYE REDNESS: 0
EYE PAIN: 0
COUGH: 0
VOMITING: 0
SORE THROAT: 0
ABDOMINAL PAIN: 0
SINUS PAIN: 0
DIARRHEA: 0
CHEST TIGHTNESS: 0
SHORTNESS OF BREATH: 0
NAUSEA: 0

## 2020-11-08 NOTE — ED NOTES
Lovenox given. Denies further needs. [place] given to visitor.      Arielle Olea RN  11/07/20 [id number]

## 2020-11-08 NOTE — ED PROVIDER NOTES
[facility] ED  Emergency Department        Pt Name: Marvin Liriano  MRN: [id number]  [place] 1960  Date of evaluation: 11/7/20    CHIEF COMPLAINT       Chief Complaint   Patient presents with    Leg Swelling     bilateral leg and thigh swelling in lower extremities with tenderness. warmth noted on calves bilaterally. Pt is on chemo medications       HISTORY OF PRESENT ILLNESS  (Location/Symptom, Timing/Onset, Context/Setting, Quality, Duration, ModifyingFactors, Severity.)      Marvin Liriano is a 64 y.o. male who presents with bilateral lower extremity swelling, tenderness, and warmth. The patient has a history of cholangiocarcinoma and has had a cholecystectomy. He is currently on chemotherapy. He denies history of DVT. He has noted swelling in his legs for the past 2 days along with some warmth and discomfort with the swelling. No breathlessness. No fever, chills, or sweats. He describes the pain is aching and throbbing. It is mild to moderate in intensity. Nothing seems to make it better or worse. PAST MEDICAL / SURGICAL / SOCIAL / FAMILY HISTORY      has a past medical history of Anxiety, Chronic back pain, Depression, Gastroesophageal reflux disease without esophagitis, and 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[facility] [address] has a past surgical history that includes Mandible surgery; ERCP (08/07/2020); ERCP (8/7/2020); Port Surgery (Left, 09/18/2020); and Port Surgery (Left, 9/18/2020).        Social History     Socioeconomic History    Marital status:      Spouse name: Marietta Lai Number of children: 2    Years of education: 12    Highest education level: Not on file   Occupational History    Occupation:    Social Needs    Financial resource strain: Not hard at all   [place] insecurity     Worry: Never true     Inability: Never true   [employer] needs     Medical: No     Non-medical: No   Tobacco Use    Smoking status: Current Every Day Smoker     Packs/day: 0.25 Years: 20.00     Pack years: 5.00     Types: Cigarettes    Smokeless tobacco: Never Used   Substance and Sexual Activity    Alcohol use: Yes     Alcohol/week: 5.8 standard drinks     Types: 7 Standard drinks or equivalent per week     Comment: Occ    Drug use: No    Sexual activity: Yes   Lifestyle    Physical activity     Days per week: Not on file     Minutes per session: Not on file    Stress: Not on file   Relationships    Social connections     Talks on phone: Not on file     Gets together: Not on file     Attends Yazidism service: Not on file     Active member of club or organization: Not on file     Attends meetings of clubs or organizations: Not on file     Relationship status: Not on file    Intimate partner violence     Fear of current or ex partner: Not on file     Emotionally abused: Not on file     Physically abused: Not on file     Forced sexual activity: Not on file   Other Topics Concern    Not on file   Social History Narrative    Not on file       Family History   Problem Relation Age of Onset    Hypertension Mother     Stroke Mother     High Blood Pressure Mother     Diabetes Father     Hypertension Father     High Blood Pressure Father     Heart Disease Father        Allergies:  Asa [aspirin] and Chocolate    Home Medications:  Prior to Admission medications    Medication Sig Start Date End Date Taking?  Authorizing Provider   levoFLOXacin (LEVAQUIN) 500 MG tablet Take 1 tablet by mouth daily for 7 days 11/8/20 11/15/20 Yes Choe Mishra III, MD   prochlorperazine (COMPAZINE) 10 MG tablet Take 10 mg by mouth every 6 hours as needed   Yes Historical Provider, MD   Multiple Vitamins-Minerals (THERAPEUTIC MULTIVITAMIN-MINERALS) tablet Take 1 tablet by mouth daily    Historical Provider, MD   nicotine (NICODERM CQ) 21 MG/24HR Place 1 patch onto the skin daily as needed (if patient is a smoker and requests nicotine replacemnt therapy) 8/8/20   Mariana Teresa MD   omeprazole (PRILOSEC) 40 MG delayed release capsule Take 1 capsule by mouth every morning (before breakfast) 6/22/20   Shelli Cason, APRN - CNP       REVIEW OF SYSTEMS    (2-9 systems for level 4, 10 or more for level 5)      Review of Systems   Constitutional: Negative for activity change, appetite change, chills and fever. HENT: Negative for congestion, sinus pain, sore throat and trouble swallowing. Eyes: Negative for pain and redness. Respiratory: Negative for cough, chest tightness and shortness of breath. Cardiovascular: Positive for leg swelling. Negative for chest pain and palpitations. Gastrointestinal: Negative for abdominal distention, abdominal pain, constipation, diarrhea, nausea and vomiting. Genitourinary: Negative for decreased urine volume, difficulty urinating, dysuria, flank pain, hematuria and urgency. Musculoskeletal: Negative for arthralgias and myalgias. Skin: Positive for rash. Negative for wound. Neurological: Negative for dizziness, seizures, speech difficulty, light-headedness, numbness and headaches. Hematological: Negative for adenopathy. Psychiatric/Behavioral: Negative for confusion, decreased concentration, dysphoric mood and hallucinations. The patient is not hyperactive. All other systems reviewed and are negative. PHYSICAL EXAM   (up to 7 for level 4, 8 or more for level 5)     INITIAL VITALS:   /65   Pulse 78   Temp 97.9 °F (36.6 °C) (Oral)   Resp 18   Ht 5' 9\" (1.753 m)   Wt 197 lb (89.4 kg)   SpO2 96%   BMI 29.09 kg/m²     Physical Exam  Vitals signs and nursing note reviewed. Constitutional:       General: He is in acute distress. Appearance: Normal appearance. He is not ill-appearing or diaphoretic. HENT:      Head: Normocephalic and atraumatic. Right Ear: External ear normal.      Left Ear: External ear normal.      Nose: Nose normal. No rhinorrhea.       Mouth/Throat:      Mouth: Mucous membranes are moist.      Pharynx: No posterior oropharyngeal erythema. Eyes:      General: No scleral icterus. Extraocular Movements: Extraocular movements intact. Pupils: Pupils are equal, round, and reactive to light. Neck:      Musculoskeletal: Normal range of motion and neck supple. No muscular tenderness. Cardiovascular:      Rate and Rhythm: Normal rate and regular rhythm. Pulses: Normal pulses. Heart sounds: Normal heart sounds. Pulmonary:      Effort: Pulmonary effort is normal.      Breath sounds: Normal breath sounds. Abdominal:      General: Bowel sounds are normal. There is no distension. Palpations: Abdomen is soft. Tenderness: There is no abdominal tenderness. There is no guarding or rebound. Musculoskeletal: Normal range of motion. General: Swelling and tenderness present. Right lower leg: Edema present. Left lower leg: Edema present. Lymphadenopathy:      Cervical: No cervical adenopathy. Skin:     General: Skin is warm and dry. Findings: Erythema present. Comments: There is 1+ pedal and pretibial edema with erythema noted on the anterior portion of the lower extremities bilaterally and to the anteromedial aspect of the thighs bilaterally. There is warmth in this area as well. Neurological:      General: No focal deficit present. Mental Status: He is alert and oriented to person, place, and time.    Psychiatric:         Mood and Affect: Mood normal.         Behavior: Behavior normal.         DIFFERENTIAL  DIAGNOSIS       PLAN (LABS / IMAGING / EKG):  Orders Placed This Encounter   Procedures    Culture, Blood 1    Culture, Blood 2    XR CHEST PORTABLE    US DUP LOWER EXTREMITIES BILATERAL VENOUS    CBC auto differential    Comprehensive Metabolic Panel    Lactic Acid, Plasma    D-dimer, quantitative    Sedimentation Rate    Magnesium    Brain Natriuretic Peptide    Troponin    Blood gas, venous    EKG 12 Lead    Insert peripheral IV MEDICATIONS ORDERED:  Orders Placed This Encounter   Medications    sodium chloride flush 0.9 % injection 10 mL    sodium chloride flush 0.9 % injection 10 mL    piperacillin-tazobactam (ZOSYN) 4.5 g in dextrose 5 % 100 mL IVPB (mini-bag)     Order Specific Question:   Antimicrobial Indications     Answer: Other     Order Specific Question:   Other Abx Indication     Answer: Suspected Sepsis of Skin or Soft Tissue Origin    vancomycin (VANCOCIN) 2,250 mg in dextrose 5 % 500 mL IVPB     Order Specific Question:   Antimicrobial Indications     Answer: Other     Order Specific Question:   Other Abx Indication     Answer:    Suspected Sepsis of Skin or Soft Tissue Origin    sterile water injection     Rosary Kasi: cabinet override    enoxaparin (LOVENOX) injection 90 mg    levoFLOXacin (LEVAQUIN) 500 MG tablet     Sig: Take 1 tablet by mouth daily for 7 days     Dispense:  7 tablet     Refill:  0       DIAGNOSTIC RESULTS / EMERGENCY DEPARTMENT COURSE / MDM     LABS:  Results for orders placed or performed during the hospital encounter of 11/07/20   CBC auto differential   Result Value Ref Range    WBC 8.6 3.5 - 11.3 k/uL    RBC 3.10 (L) 4.21 - 5.77 m/uL    Hemoglobin 10.5 (L) 13.0 - 17.0 g/dL    Hematocrit 31.3 (L) 40.7 - 50.3 %    .0 82.6 - 102.9 fL    MCH 33.9 (H) 25.2 - 33.5 pg    MCHC 33.5 28.4 - 34.8 g/dL    RDW 16.3 (H) 11.8 - 14.4 %    Platelets 831 (L) 484 - 453 k/uL    MPV 10.1 8.1 - 13.5 fL    NRBC Automated 0.0 0.0 per 100 WBC    Differential Type NOT REPORTED     Seg Neutrophils 79 (H) 36 - 65 %    Lymphocytes 17 (L) 24 - 43 %    Monocytes 1 (L) 3 - 12 %    Eosinophils % 3 1 - 4 %    Basophils 0 0 - 2 %    Immature Granulocytes 0 0 %    Segs Absolute 6.72 1.50 - 8.10 k/uL    Absolute Lymph # 1.49 1.10 - 3.70 k/uL    Absolute Mono # 0.10 0.10 - 1.20 k/uL    Absolute Eos # 0.23 0.00 - 0.44 k/uL    Basophils Absolute <0.03 0.00 - 0.20 k/uL    Absolute Immature Granulocyte 0.03 0.00 - REPORTED 0 - 5 %    Methemoglobin NOT REPORTED 0.0 - 1.9 %    Pt Temp NOT REPORTED     pH, [place], Temp Adj NOT REPORTED 7.320 - 7.420    pCO2, [place], Temp Adj NOT REPORTED 39.0 - 55.0 mmHg    pO2, [place], Temp Adj NOT REPORTED 30.0 - 50.0 mmHg    O2 Device/Flow/% NOT REPORTED     Respiratory Rate NOT REPORTED     Codey Test NOT REPORTED     Sample Site NOT REPORTED     Pt. Position NOT REPORTED     Mode NOT REPORTED     Set Rate NOT REPORTED     Total Rate NOT REPORTED     VT NOT REPORTED     FIO2 NOT REPORTED     Peep/Cpap NOT REPORTED     PSV NOT REPORTED     Text for Respiratory NOT REPORTED     NOTIFICATION NOT REPORTED     NOTIFICATION TIME NOT REPORTED    EKG 12 Lead   Result Value Ref Range    Ventricular Rate 89 BPM    Atrial Rate 89 BPM    P-R Interval 152 ms    QRS Duration 98 ms    Q-T Interval 382 ms    QTc Calculation (Bazett) 464 ms    P Axis 58 degrees    R Axis 82 degrees    T Axis 52 degrees       IMPRESSION:   1. Bilateral lower extremity edema    2. Bilateral lower leg cellulitis    3. Elevated d-dimer          RADIOLOGY:    XR CHEST PORTABLE   Final Result   No acute cardiopulmonary abnormality. US DUP LOWER EXTREMITIES BILATERAL VENOUS    (Results Pending)     I have personally viewed all of the X-ray images and reviewed the radiologist's interpretation(s) and concur. EKG:    Electrocardiogram:  EKG # 1  Ordered, reviewed, and independently interpreted by the EP. Time Interpreted: 2040 hrs. EKG interpreted by me in the absence of the cardiologist contemporaneously with patient care shows normal sinus rhythm rate of 89 bpm, normal CA 0.15, normal QTC 0.46, normal axis +82. No STEMI. No bundle branch block pattern. No acute ischemia. POC ULTRASOUND:  N/A    EMERGENCY DEPARTMENT COURSE:    Time: 1:40 AM EST  Discussed all findings and plan of care with patient and family/friend. Suitable for outpatient management with close PCP follow-up as directed.  I emphasized the importance of follow up with PCP. Explained reasons to return to the ED. Pt is understanding and agreeable to the treatment plan and discharge. All questions were answered. Reassessment at the time of disposition demonstrates that the pt is in no acute distress. Pt has remained hemodynamically stable throughout the entire ED visit and is without objective evidence for acute process requiring urgent intervention or hospitalization. The pt is stable for discharge, counseling is provided as documented below, discussed symptomatic treatment and specific conditions for return. PROCEDURES:  N/A    CONSULTS:  None    CRITICAL CARE:  N/A    FINAL IMPRESSION      1. Bilateral lower extremity edema    2. Bilateral lower leg cellulitis    3. Elevated d-dimer          DISPOSITION / PLAN     DISPOSITION        PATIENT REFERRED TO:  No follow-up provider specified.     DISCHARGE MEDICATIONS:  Discharge Medication List as of 11/8/2020  1:44 AM      START taking these medications    Details   levoFLOXacin (LEVAQUIN) 500 MG tablet Take 1 tablet by mouth daily for 7 days, Disp-7 tablet,R-0Print             Madison Paz III  3:44 AM    Attending Emergency Physician  [address] [place] [address] ED    (Please note that portions of this note were completed with a voice recognition program.  Effortswere made to edit the dictations but occasionally words are mis-transcribed.)             Ravinder Nur MD  11/08/20 [id number]

## 2020-11-09 ENCOUNTER — TELEPHONE (OUTPATIENT)
Dept: PRIMARY CARE CLINIC | Age: 60
End: 2020-11-09

## 2020-11-09 NOTE — TELEPHONE ENCOUNTER
[facility] Transitions Initial Follow Up Call    Outreach made within 2 business days of discharge: Yes    Patient: Mehrdad Boston Patient : 1960   MRN: [id number]  Reason for Admission: There are no discharge diagnoses documented for the most recent discharge. Discharge Date: 20       Spoke with: Jazzy Celis    Discharge department/facility: [facility] ER    TCM Interactive Patient Contact:  Was patient able to fill all prescriptions: Yes  Was patient instructed to bring all medications to the follow-up visit: Yes  Is patient taking all medications as directed in the discharge summary?  Yes  Does patient understand their discharge instructions: Yes  Does patient have questions or concerns that need addressed prior to 7-14 day follow up office visit: no, will call if needed    Scheduled appointment with PCP within 7-14 days    Follow Up  Future Appointments   Date Time Provider Martell Bergman   2020 10:20 AM JUAN CARLOS Hawkins - CNP [facility]       Sidney Ramirez

## 2020-11-12 ENCOUNTER — OFFICE VISIT (OUTPATIENT)
Dept: PRIMARY CARE CLINIC | Age: 60
End: 2020-11-12
Payer: COMMERCIAL

## 2020-11-12 VITALS
TEMPERATURE: 99.1 F | HEART RATE: 90 BPM | RESPIRATION RATE: 20 BRPM | BODY MASS INDEX: 29.49 KG/M2 | WEIGHT: 199.7 LBS | SYSTOLIC BLOOD PRESSURE: 122 MMHG | DIASTOLIC BLOOD PRESSURE: 78 MMHG

## 2020-11-12 LAB
CULTURE: NORMAL
CULTURE: NORMAL
Lab: NORMAL
Lab: NORMAL
SPECIMEN DESCRIPTION: NORMAL
SPECIMEN DESCRIPTION: NORMAL

## 2020-11-12 PROCEDURE — G8484 FLU IMMUNIZE NO ADMIN: HCPCS | Performed by: NURSE PRACTITIONER

## 2020-11-12 PROCEDURE — G8427 DOCREV CUR MEDS BY ELIG CLIN: HCPCS | Performed by: NURSE PRACTITIONER

## 2020-11-12 PROCEDURE — 99214 OFFICE O/P EST MOD 30 MIN: CPT | Performed by: NURSE PRACTITIONER

## 2020-11-12 PROCEDURE — 4004F PT TOBACCO SCREEN RCVD TLK: CPT | Performed by: NURSE PRACTITIONER

## 2020-11-12 PROCEDURE — 3017F COLORECTAL CA SCREEN DOC REV: CPT | Performed by: NURSE PRACTITIONER

## 2020-11-12 PROCEDURE — G8419 CALC BMI OUT NRM PARAM NOF/U: HCPCS | Performed by: NURSE PRACTITIONER

## 2020-11-12 RX ORDER — DOXYCYCLINE HYCLATE 100 MG/1
100 CAPSULE ORAL 2 TIMES DAILY
Qty: 20 CAPSULE | Refills: 0 | Status: SHIPPED | OUTPATIENT
Start: 2020-11-12 | End: 2020-11-22

## 2020-11-12 RX ORDER — FUROSEMIDE 20 MG/1
20 TABLET ORAL DAILY PRN
Qty: 30 TABLET | Refills: 0 | Status: SHIPPED | OUTPATIENT
Start: 2020-11-12 | End: 2021-09-24 | Stop reason: ALTCHOICE

## 2020-11-12 ASSESSMENT — ENCOUNTER SYMPTOMS
SHORTNESS OF BREATH: 0
SORE THROAT: 0
CONSTIPATION: 0
RHINORRHEA: 0
NAUSEA: 0
DIARRHEA: 0
VOMITING: 0
ABDOMINAL PAIN: 0
COLOR CHANGE: 1
WHEEZING: 0
COUGH: 0

## 2020-11-12 NOTE — PROGRESS NOTES
Name: Joseluis North  : 1960         Chief Complaint:     Chief Complaint   Patient presents with    Cellulitis     right lower leg X 5days       History of Present Illness:      Joseluis North is a 64 y.o.  male who presents with Cellulitis (right lower leg X 5days)      Arlina Meigs is here today for same-day office visit. Patient was seen in the emergency department for a right lower leg cellulitis. He did have bilateral Doppler studies completed which revealed no DVT. He was given a prescription for Levaquin. He states the redness has slightly improved but not a whole lot. The swelling is still present. Leg Pain    The incident occurred 5 to 7 days ago. The incident occurred at home. There was no injury mechanism. The pain is present in the right leg. The quality of the pain is described as aching (Redness warmth). The pain is at a severity of 3/10. The pain is mild. The pain has been improving since onset. Pertinent negatives include no inability to bear weight, loss of motion, loss of sensation, muscle weakness, numbness or tingling. He reports no foreign bodies present. The symptoms are aggravated by weight bearing. He has tried rest (Levaquin and rest) for the symptoms. The treatment provided mild relief. 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 Past Surgical History:     Past Surgical History:   Procedure Laterality Date    ERCP  2020    stent insertion, cholangiopancreatography  with brushings.     ERCP  2020    ERCP STENT INSERTION performed by Ten Harris MD at [address] PORT SURGERY Left 2020    left subclavian port insertion    PORT SURGERY Left 9/18/2020    PORT INSERTION, SUBCLAVIAN, W/C-ARM performed by Deb Dougherty DO at [address]        Medications:       Prior to Admission medications    Medication Sig Start Date End Date Taking? Authorizing Provider   furosemide (LASIX) 20 MG tablet Take 1 tablet by mouth daily as needed (lower leg swelling) 11/12/20  Yes JUAN CARLOS Leblanc CNP   doxycycline hyclate (VIBRAMYCIN) 100 MG capsule Take 1 capsule by mouth 2 times daily for 10 days 11/12/20 11/22/20 Yes JUAN CARLOS Leblanc - CNP   Multiple Vitamins-Minerals (THERAPEUTIC MULTIVITAMIN-MINERALS) tablet Take 1 tablet by mouth daily   Yes Historical Provider, MD   omeprazole (PRILOSEC) 40 MG delayed release capsule Take 1 capsule by mouth every morning (before breakfast) 6/22/20  Yes JUAN CARLOS Leblanc CNP   prochlorperazine (COMPAZINE) 10 MG tablet Take 10 mg by mouth every 6 hours as needed    Historical Provider, MD   nicotine (NICODERM CQ) 21 MG/24HR Place 1 patch onto the skin daily as needed (if patient is a smoker and requests nicotine replacemnt therapy)  Patient not taking: Reported on 11/12/2020 8/8/20   Cuba Ramírez MD        Allergies:       Asa [aspirin] and Chocolate    Social History:     Tobacco:    reports that he has been smoking cigarettes. He has a 5.00 pack-year smoking history. He has never used smokeless tobacco.  Alcohol:      reports current alcohol use of about 5.8 standard drinks of alcohol per week. Drug Use:  reports no history of drug use. Family History:     Family History   Problem Relation Age of Onset    Hypertension Mother     Stroke Mother     High Blood Pressure Mother     Diabetes Father     Hypertension Father     High Blood Pressure Father     Heart Disease Father        Review of Systems:     Positive and Negative as described in HPI    Review of Systems   Constitutional: Negative for chills, fatigue and fever.    HENT: Negative for congestion, rhinorrhea and sore throat. Eyes: Negative for visual disturbance. Respiratory: Negative for cough, shortness of breath and wheezing. Cardiovascular: Positive for leg swelling. Negative for chest pain and palpitations. Gastrointestinal: Negative for abdominal pain, constipation, diarrhea, nausea and vomiting. Genitourinary: Negative for difficulty urinating and dysuria. Musculoskeletal: Negative for gait problem, neck pain and neck stiffness. Skin: Positive for color change (Erythema). Negative for rash and wound. Neurological: Negative for dizziness, tingling, syncope, light-headedness, numbness and headaches. Physical Exam:   Vitals:  /78   Pulse 90   Temp 99.1 °F (37.3 °C) (Temporal)   Resp 20   Wt 199 lb 11.2 oz (90.6 kg)   BMI 29.49 kg/m²     Physical Exam  Vitals signs and nursing note reviewed. Constitutional:       General: He is not in acute distress. Appearance: Normal appearance. He is not ill-appearing. HENT:      Mouth/Throat:      Mouth: Mucous membranes are moist.   Eyes:      General: No scleral icterus. Conjunctiva/sclera: Conjunctivae normal.   Neck:      Musculoskeletal: Normal range of motion and neck supple. Cardiovascular:      Rate and Rhythm: Normal rate and regular rhythm. Heart sounds: No murmur. Pulmonary:      Effort: Pulmonary effort is normal.      Breath sounds: Normal breath sounds. No wheezing. Musculoskeletal:      Right lower leg: Edema (1+ pitting) present. Left lower leg: Edema (trace) present. Skin:     General: Skin is warm and dry. Findings: Erythema (RLE) present. Neurological:      Mental Status: He is alert and oriented to person, place, and time.    Psychiatric:         Mood and Affect: Mood normal.         Behavior: Behavior normal.         Data:     Lab Results   Component Value Date     11/07/2020    K 3.9 11/07/2020     11/07/2020    CO2 23 11/07/2020    BUN 14 11/07/2020    CREATININE 0.60 11/07/2020 GLUCOSE 122 11/07/2020    PROT 5.9 11/07/2020    LABALBU 3.2 11/07/2020    BILITOT 0.29 11/07/2020    ALKPHOS 80 11/07/2020    AST 55 11/07/2020    ALT 78 11/07/2020     Lab Results   Component Value Date    WBC 8.6 11/07/2020    RBC 3.10 11/07/2020    HGB 10.5 11/07/2020    HCT 31.3 11/07/2020    .0 11/07/2020    MCH 33.9 11/07/2020    MCHC 33.5 11/07/2020    RDW 16.3 11/07/2020     11/07/2020    MPV 10.1 11/07/2020     Lab Results   Component Value Date    TSH 1.51 06/29/2020     Lab Results   Component Value Date    CHOL 267 06/29/2020    HDL 99 06/29/2020       Assessment/Plan:      Diagnosis Orders   1. Cellulitis of right lower leg  doxycycline hyclate (VIBRAMYCIN) 100 MG capsule   2. Lower leg edema  furosemide (LASIX) 20 MG tablet     We will discontinue Levaquin and start doxycycline. He will be given Lasix for intermittent lower leg edema. He will follow-up with his oncologist as scheduled. 1.  Jonah Martínez received counseling on the following healthy behaviors: medication adherence  2. Patient given educational materials - see patient instructions  3. Was a self-tracking handout given in paper form or via sofatronict? No  If yes, see orders or list here. 4.  Discussed use, benefit, and side effects of prescribed medications. Barriers to medication compliance addressed. All patient questions answered. Pt voiced understanding. 5.  Reviewed prior labs and health maintenance  6. Continue current medications, diet and exercise. Completed Refills   Requested Prescriptions     Signed Prescriptions Disp Refills    furosemide (LASIX) 20 MG tablet 30 tablet 0     Sig: Take 1 tablet by mouth daily as needed (lower leg swelling)    doxycycline hyclate (VIBRAMYCIN) 100 MG capsule 20 capsule 0     Sig: Take 1 capsule by mouth 2 times daily for 10 days         Return if symptoms worsen or fail to improve.

## 2020-11-12 NOTE — PATIENT INSTRUCTIONS
SURVEY:    You may be receiving a survey from [employer] regarding your visit today. Please complete the survey to enable us to provide the highest quality of care to you and your family. If you cannot score us a very good on any question, please call the office to discuss how we could have made your experience a very good one. Thank you. Patient Education        Cellulitis: Care Instructions  Your Care Instructions     Cellulitis is a skin infection caused by bacteria, most often strep or staph. It often occurs after a break in the skin from a scrape, cut, bite, or puncture, or after a rash. Cellulitis may be treated without doing tests to find out what caused it. But your doctor may do tests, if needed, to look for a specific bacteria, like methicillin-resistant Staphylococcus aureus (MRSA). The doctor has checked you carefully, but problems can develop later. If you notice any problems or new symptoms, get medical treatment right away. Follow-up care is a key part of your treatment and safety. Be sure to make and go to all appointments, and call your doctor if you are having problems. It's also a good idea to know your test results and keep a list of the medicines you take. How can you care for yourself at home? · Take your antibiotics as directed. Do not stop taking them just because you feel better. You need to take the full course of antibiotics. · Prop up the infected area on pillows to reduce pain and swelling. Try to keep the area above the level of your heart as often as you can. · If your doctor told you how to care for your wound, follow your doctor's instructions. If you did not get instructions, follow this general advice:  ? Wash the wound with clean water 2 times a day. Don't use hydrogen peroxide or alcohol, which can slow healing. ? You may cover the wound with a thin layer of petroleum jelly, such as Vaseline, and a nonstick bandage. ?  Apply more petroleum jelly and replace the bandage as needed. · Be safe with medicines. Take pain medicines exactly as directed. ? If the doctor gave you a prescription medicine for pain, take it as prescribed. ? If you are not taking a prescription pain medicine, ask your doctor if you can take an over-the-counter medicine. To prevent cellulitis in the future  · Try to prevent cuts, scrapes, or other injuries to your skin. Cellulitis most often occurs where there is a break in the skin. · If you get a scrape, cut, mild burn, or bite, wash the wound with clean water as soon as you can to help avoid infection. Don't use hydrogen peroxide or alcohol, which can slow healing. · If you have swelling in your legs (edema), support stockings and good skin care may help prevent leg sores and cellulitis. · Take care of your feet, especially if you have diabetes or other conditions that increase the risk of infection. Wear shoes and socks. Do not go barefoot. If you have athlete's foot or other skin problems on your feet, talk to your doctor about how to treat them. When should you call for help? Call your doctor now or seek immediate medical care if:    · You have signs that your infection is getting worse, such as:  ? Increased pain, swelling, warmth, or redness. ? Red streaks leading from the area. ? Pus draining from the area. ? A fever.     · You get a rash. Watch closely for changes in your health, and be sure to contact your doctor if:    · You do not get better as expected. Where can you learn more? Go to https://eLibs.compeGLWL Research.Buzz360. org and sign in to your [employer] account. Enter A350 in the [facility] box to learn more about \"Cellulitis: Care Instructions. \"     If you do not have an account, please click on the \"Sign Up Now\" link. Current as of: July 2, 2020               Content Version: 12.6  © 3888-2775 [employer]. Care instructions adapted under license by [facility] ([facility]).  If you have questions about a medical condition or this instruction, always ask your healthcare professional. [place] [address] any warranty or liability for your use of this information.

## 2021-03-24 ENCOUNTER — OFFICE VISIT (OUTPATIENT)
Dept: PRIMARY CARE CLINIC | Age: 61
End: 2021-03-24
Payer: COMMERCIAL

## 2021-03-24 VITALS
WEIGHT: 206.4 LBS | SYSTOLIC BLOOD PRESSURE: 132 MMHG | RESPIRATION RATE: 18 BRPM | BODY MASS INDEX: 30.57 KG/M2 | TEMPERATURE: 97.6 F | OXYGEN SATURATION: 99 % | HEART RATE: 73 BPM | DIASTOLIC BLOOD PRESSURE: 86 MMHG | HEIGHT: 69 IN

## 2021-03-24 DIAGNOSIS — R03.0 TRANSIENT ELEVATED BLOOD PRESSURE: ICD-10-CM

## 2021-03-24 DIAGNOSIS — K21.9 GASTROESOPHAGEAL REFLUX DISEASE WITHOUT ESOPHAGITIS: Primary | ICD-10-CM

## 2021-03-24 DIAGNOSIS — Z13.220 LIPID SCREENING: ICD-10-CM

## 2021-03-24 PROCEDURE — G8417 CALC BMI ABV UP PARAM F/U: HCPCS | Performed by: NURSE PRACTITIONER

## 2021-03-24 PROCEDURE — 99214 OFFICE O/P EST MOD 30 MIN: CPT | Performed by: NURSE PRACTITIONER

## 2021-03-24 PROCEDURE — 4004F PT TOBACCO SCREEN RCVD TLK: CPT | Performed by: NURSE PRACTITIONER

## 2021-03-24 PROCEDURE — G8484 FLU IMMUNIZE NO ADMIN: HCPCS | Performed by: NURSE PRACTITIONER

## 2021-03-24 PROCEDURE — G8427 DOCREV CUR MEDS BY ELIG CLIN: HCPCS | Performed by: NURSE PRACTITIONER

## 2021-03-24 PROCEDURE — 3017F COLORECTAL CA SCREEN DOC REV: CPT | Performed by: NURSE PRACTITIONER

## 2021-03-24 RX ORDER — OMEPRAZOLE 40 MG/1
40 CAPSULE, DELAYED RELEASE ORAL
Qty: 90 CAPSULE | Refills: 1 | Status: SHIPPED
Start: 2021-03-24 | End: 2021-09-24 | Stop reason: ALTCHOICE

## 2021-03-24 ASSESSMENT — ENCOUNTER SYMPTOMS
WATER BRASH: 0
VOMITING: 0
RHINORRHEA: 0
COUGH: 0
DIARRHEA: 0
HOARSE VOICE: 0
GLOBUS SENSATION: 0
BELCHING: 0
SHORTNESS OF BREATH: 0
CONSTIPATION: 0
WHEEZING: 0
SORE THROAT: 0
NAUSEA: 0
CHOKING: 0
HEARTBURN: 1
BLURRED VISION: 0
STRIDOR: 0
ABDOMINAL PAIN: 0
ORTHOPNEA: 0

## 2021-03-24 ASSESSMENT — PATIENT HEALTH QUESTIONNAIRE - PHQ9: SUM OF ALL RESPONSES TO PHQ QUESTIONS 1-9: 0

## 2021-03-24 NOTE — PATIENT INSTRUCTIONS
SURVEY:    You may be receiving a survey from [employer] regarding your visit today. Please complete the survey to enable us to provide the highest quality of care to you and your family. If you cannot score us a very good on any question, please call the office to discuss how we could have made your experience a very good one. Thank you.

## 2021-03-24 NOTE — PROGRESS NOTES
Name: Erin Metzger  : 1960         Chief Complaint:     Chief Complaint   Patient presents with    Check-Up    Hypertension     x 2 weeks. After radiation has been noticing higher BP. History of Present Illness:      Erin Metzger is a 64 y.o.  male who presents with Check-Up and Hypertension (x 2 weeks. After radiation has been noticing higher BP. )      Cite Aleksandr Elizabeth is here for a routine office visit. Elevated blood pressure readings- he reports his blood pressure has been elevated at the end of radiation treatments. Readings today in office was 144/96, blood pressure was rechecked after 10 minutes and was 132/86. Denies any headaches or blurred vision. He has not been on medication for hypertension in the past.    Gastroesophageal Reflux  He complains of heartburn. He reports no abdominal pain, no belching, no chest pain, no choking, no coughing, no dysphagia, no early satiety, no globus sensation, no hoarse voice, no nausea, no sore throat, no stridor, no tooth decay, no water brash or no wheezing. This is a chronic problem. The current episode started more than 1 year ago. The problem occurs occasionally. The problem has been unchanged. The heartburn duration is less than a minute. The heartburn is located in the substernum. The heartburn is of moderate intensity. The heartburn does not wake him from sleep. The heartburn does not limit his activity. The heartburn doesn't change with position. The symptoms are aggravated by certain foods, smoking and stress. Pertinent negatives include no anemia, fatigue, melena, muscle weakness or weight loss. Risk factors include lack of exercise, smoking/tobacco exposure and caffeine use. He has tried a PPI for the symptoms. The treatment provided significant relief. Past procedures do not include an EGD or a UGI. Past invasive treatments do not include gastroplasty, gastroplication or reflux surgery. Hypertension  This is a new problem.  The current episode started 1 to 4 weeks ago. The problem has been waxing and waning since onset. The problem is controlled. Pertinent negatives include no anxiety, blurred vision, chest pain, headaches, malaise/fatigue, neck pain, orthopnea, palpitations, peripheral edema, PND, shortness of breath or sweats. There are no associated agents to hypertension. Risk factors for coronary artery disease include male gender, obesity and stress. Past treatments include nothing. The current treatment provides no improvement. Compliance problems include exercise and diet. There is no history of kidney disease, CAD/MI, CVA or heart failure. There is no history of chronic renal disease. Past Medical History:     Past Medical History:   Diagnosis Date    Anxiety     Chronic back pain     Depression     Gastroesophageal reflux disease without esophagitis 8/6/2020    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[facility] [address] 8/7/2020      Reviewed all health maintenance requirements and ordered appropriate tests  Health Maintenance Due   Topic Date Due    COVID-19 Vaccine (1) Never done       Past Surgical History:     Past Surgical History:   Procedure Laterality Date    ERCP  08/07/2020    stent insertion, cholangiopancreatography  with brushings.  ERCP  8/7/2020    ERCP STENT INSERTION performed by Shyanne Cohn MD at [address] PORT SURGERY Left 09/18/2020    left subclavian port insertion    PORT SURGERY Left 9/18/2020    PORT INSERTION, SUBCLAVIAN, W/C-ARM performed by Bear Sun DO at [address]        Medications:       Prior to Admission medications    Medication Sig Start Date End Date Taking?  Authorizing Provider   omeprazole (PRILOSEC) 40 MG delayed release capsule Take 1 capsule by mouth every morning (before breakfast) 3/24/21  Yes Maria E Cason APRN - CNP   furosemide (LASIX) 20 MG tablet Take 1 tablet by mouth daily as needed (lower leg swelling) 11/12/20  Yes Maria E Cason APRN - DEANNA   Multiple Vitamins-Minerals (THERAPEUTIC MULTIVITAMIN-MINERALS) tablet Take 1 tablet by mouth daily   Yes Historical Provider, MD   prochlorperazine (COMPAZINE) 10 MG tablet Take 10 mg by mouth every 6 hours as needed    Historical Provider, MD        Allergies:       Asa [aspirin] and Chocolate    Social History:     Tobacco:    reports that he has been smoking cigarettes. He has a 5.00 pack-year smoking history. He has never used smokeless tobacco.  Alcohol:      reports current alcohol use of about 5.8 standard drinks of alcohol per week. Drug Use:  reports no history of drug use. Family History:     Family History   Problem Relation Age of Onset    Hypertension Mother     Stroke Mother     High Blood Pressure Mother     Diabetes Father     Hypertension Father     High Blood Pressure Father     Heart Disease Father        Review of Systems:     Positive and Negative as described in HPI    Review of Systems   Constitutional: Negative for chills, fatigue, fever, malaise/fatigue and weight loss. HENT: Negative for congestion, hoarse voice, rhinorrhea and sore throat. Eyes: Negative for blurred vision and visual disturbance. Respiratory: Negative for cough, choking, shortness of breath and wheezing. Cardiovascular: Negative for chest pain, palpitations, orthopnea and PND. Gastrointestinal: Positive for heartburn. Negative for abdominal pain, constipation, diarrhea, dysphagia, melena, nausea and vomiting. Genitourinary: Negative for difficulty urinating and dysuria. Musculoskeletal: Negative for gait problem, muscle weakness, neck pain and neck stiffness. Skin: Negative for rash. Neurological: Negative for dizziness, syncope, light-headedness and headaches.        Physical Exam:   Vitals:  BP (!) 144/96 (Site: Left Upper Arm, Position: Sitting, Cuff Size: Large Adult)   Pulse 73   Temp 97.6 °F (36.4 °C) (Temporal)   Resp 18   Ht 5' 9\" (1.753 m)   Wt 206 lb 6.4 oz (93.6 kg)   SpO2 99%   BMI 30.48 kg/m²     Physical Exam  Vitals signs and nursing note reviewed. Constitutional:       General: He is not in acute distress. Appearance: Normal appearance. He is well-developed. HENT:      Head: Normocephalic. Right Ear: Hearing normal.      Left Ear: Hearing normal.      Mouth/Throat:      Mouth: Mucous membranes are moist.   Eyes:      General: No scleral icterus. Conjunctiva/sclera: Conjunctivae normal.   Neck:      Musculoskeletal: Normal range of motion and neck supple. Cardiovascular:      Rate and Rhythm: Normal rate and regular rhythm. Heart sounds: No murmur. Pulmonary:      Effort: Pulmonary effort is normal.      Breath sounds: Normal breath sounds. No wheezing or rales. Abdominal:      General: Bowel sounds are normal. There is no distension. Palpations: Abdomen is soft. Tenderness: There is no abdominal tenderness. Musculoskeletal:      Right lower leg: No edema. Left lower leg: No edema. Lymphadenopathy:      Cervical: No cervical adenopathy. Skin:     General: Skin is warm and dry. Findings: No rash. Neurological:      Mental Status: He is alert and oriented to person, place, and time. Psychiatric:         Mood and Affect: Mood normal.         Behavior: Behavior normal. Behavior is cooperative.          Data:     Lab Results   Component Value Date     11/07/2020    K 3.9 11/07/2020     11/07/2020    CO2 23 11/07/2020    BUN 14 11/07/2020    CREATININE 0.60 11/07/2020    GLUCOSE 122 11/07/2020    PROT 5.9 11/07/2020    LABALBU 3.2 11/07/2020    BILITOT 0.29 11/07/2020    ALKPHOS 80 11/07/2020    AST 55 11/07/2020    ALT 78 11/07/2020     Lab Results   Component Value Date    WBC 8.6 11/07/2020    RBC 3.10 11/07/2020    HGB 10.5 11/07/2020    HCT 31.3 11/07/2020    .0 11/07/2020    MCH 33.9 11/07/2020    MCHC 33.5 11/07/2020    RDW 16.3 11/07/2020     11/07/2020    MPV 10.1 11/07/2020     Lab Results   Component Value Date    TSH 1.51 06/29/2020     Lab Results   Component Value Date    CHOL 267 06/29/2020    HDL 99 06/29/2020       Assessment/Plan:      Diagnosis Orders   1. Gastroesophageal reflux disease without esophagitis  omeprazole (PRILOSEC) 40 MG delayed release capsule    CBC Auto Differential    ALT    AST    Basic Metabolic Panel   2. Transient elevated blood pressure     3. Lipid screening  Lipid Panel     Path labs called for most recent lab results. Continue current medications. Screening labs and office visit in 6 months. He will return to office in 2 weeks for blood pressure check with nurse. 1.  Taurus Samantha received counseling on the following healthy behaviors: nutrition, exercise and medication adherence  2. Patient given educational materials - see patient instructions  3. Was a self-tracking handout given in paper form or via [employer]? No  If yes, see orders or list here. 4.  Discussed use, benefit, and side effects of prescribed medications. Barriers to medication compliance addressed. All patient questions answered. Pt voiced understanding. 5.  Reviewed prior labs and health maintenance  6. Continue current medications, diet and exercise. Completed Refills   Requested Prescriptions     Signed Prescriptions Disp Refills    omeprazole (PRILOSEC) 40 MG delayed release capsule 90 capsule 1     Sig: Take 1 capsule by mouth every morning (before breakfast)         Return in about 6 months (around 9/24/2021) for Check up.

## 2021-04-09 ENCOUNTER — NURSE ONLY (OUTPATIENT)
Dept: PRIMARY CARE CLINIC | Age: 61
End: 2021-04-09

## 2021-04-09 VITALS
HEART RATE: 74 BPM | WEIGHT: 206.5 LBS | TEMPERATURE: 98 F | SYSTOLIC BLOOD PRESSURE: 134 MMHG | RESPIRATION RATE: 18 BRPM | OXYGEN SATURATION: 99 % | DIASTOLIC BLOOD PRESSURE: 72 MMHG | BODY MASS INDEX: 30.49 KG/M2

## 2021-04-09 NOTE — PATIENT INSTRUCTIONS
SURVEY:    You may be receiving a survey from [employer] regarding your visit today. Please complete the survey to enable us to provide the highest quality of care to you and your family. If you cannot score us a very good on any question, please call the office to discuss how we could have made your experience a very good one. Thank you.

## 2021-08-08 ENCOUNTER — HOSPITAL ENCOUNTER (EMERGENCY)
Age: 61
Discharge: LEFT BEFORE DISCHARGE | End: 2021-08-08
Attending: FAMILY MEDICINE
Payer: COMMERCIAL

## 2021-08-08 VITALS
DIASTOLIC BLOOD PRESSURE: 83 MMHG | RESPIRATION RATE: 18 BRPM | OXYGEN SATURATION: 95 % | BODY MASS INDEX: 31.75 KG/M2 | SYSTOLIC BLOOD PRESSURE: 123 MMHG | HEART RATE: 88 BPM | WEIGHT: 215 LBS

## 2021-08-08 DIAGNOSIS — M25.512 ACUTE PAIN OF LEFT SHOULDER: ICD-10-CM

## 2021-08-08 DIAGNOSIS — R55 SYNCOPE AND COLLAPSE: Primary | ICD-10-CM

## 2021-08-08 PROCEDURE — 99283 EMERGENCY DEPT VISIT LOW MDM: CPT

## 2021-08-08 ASSESSMENT — PAIN DESCRIPTION - FREQUENCY: FREQUENCY: CONTINUOUS

## 2021-08-08 ASSESSMENT — PAIN DESCRIPTION - PAIN TYPE: TYPE: ACUTE PAIN

## 2021-08-08 ASSESSMENT — PAIN DESCRIPTION - DESCRIPTORS: DESCRIPTORS: SHARP;SHOOTING

## 2021-08-08 ASSESSMENT — PAIN DESCRIPTION - ORIENTATION: ORIENTATION: LEFT

## 2021-08-08 ASSESSMENT — PAIN DESCRIPTION - LOCATION: LOCATION: ARM;SHOULDER;ELBOW

## 2021-08-08 ASSESSMENT — PAIN SCALES - GENERAL: PAINLEVEL_OUTOF10: 7

## 2021-08-08 NOTE — ED NOTES
Writer entered room to draw ordered lab work. Patient not in room. Writer spoke with registration and they confirmed they saw patient and patient's spouse leave.      Corona Daley RN  08/08/21 [id number]

## 2021-08-08 NOTE — ED PROVIDER NOTES
[facility] ED  EMERGENCY DEPARTMENT ENCOUNTER      Pt Name: Livia Leong  MRN: [id number]  [place] 1960  Date of evaluation: 8/8/2021  Provider: Dorita Ferreira MD    CHIEF COMPLAINT     Chief Complaint   Patient presents with   Morgan Spina Fall     pt fell in garage was found unresponsive by family for about  5 mins. denies syncope. Reports  6 beers from 1100 to PTA    Shoulder Pain     left shoulder pain and deformity    Elbow Pain     left elbow pain from fall         HISTORY OF PRESENT ILLNESS   (Location/Symptom, Timing/Onset, Context/Setting,Quality, Duration, Modifying Factors, Severity)  Note limiting factors. Livia Leong is a66 y.o. male who presents to the emergency department      This is a 63years old patient with a history of Klatskin's tumor -presented to ER after being found unresponsive at home. He apparently had a sixpack of beer and he was working outside in the heat trying to load up a large compressor into a truck. He did that and then he returned home and he does not member what else happened however his wife found at home unresponsive. Reports that he has some slight discomfort toward the left shoulder in the area of a Port-A-Cath. He reports there is no concern about any injuries he does not initially his Port-A-Cath is fine. His wife states the same thing she states that they are here just to check his Port-A-Cath and there is nothing else wrong with him. Nursing Notes werereviewed. REVIEW OF SYSTEMS    (2-9 systems for level 4, 10 or more for level 5)     Review of Systems   Constitutional: Negative. HENT: Negative. Eyes: Negative. Respiratory: Negative. Cardiovascular: Negative. Gastrointestinal: Negative. Endocrine: Negative. Genitourinary: Negative. Musculoskeletal: Positive for arthralgias. Negative for back pain, gait problem, joint swelling, myalgias, neck pain and neck stiffness.         Very discrete pain to the left shoulder -close to the area where he has a Port-A-Cath that appears to be placed somewhat more laterally of the left upper chest more toward the left shoulder. Neurological: Negative. Hematological: Negative. Psychiatric/Behavioral: Negative. Except as noted above the remainder of the review of systems was reviewed and negative. PAST MEDICAL HISTORY     Past Medical History:   Diagnosis Date    Anxiety     Chronic back pain     Depression     Gastroesophageal reflux disease without esophagitis 8/6/2020    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[address] 8/7/2020         SURGICALHISTORY       Past Surgical History:   Procedure Laterality Date    ERCP  08/07/2020    stent insertion, cholangiopancreatography  with brushings.     ERCP  8/7/2020    ERCP STENT INSERTION performed by Ollie Castleman, MD at [address] Left 09/18/2020    left subclavian port insertion    PORT SURGERY Left 9/18/2020    PORT INSERTION, SUBCLAVIAN, W/C-ARM performed by Genie Gonsalves DO at [address] [facility]       Discharge Medication List as of 8/8/2021  7:13 PM      CONTINUE these medications which have NOT CHANGED    Details   omeprazole (PRILOSEC) 40 MG delayed release capsule Take 1 capsule by mouth every morning (before breakfast), Disp-90 capsule, R-1Normal      Multiple Vitamins-Minerals (THERAPEUTIC MULTIVITAMIN-MINERALS) tablet Take 1 tablet by mouth dailyHistorical Med      furosemide (LASIX) 20 MG tablet Take 1 tablet by mouth daily as needed (lower leg swelling), Disp-30 tablet,R-0Normal      prochlorperazine (COMPAZINE) 10 MG tablet Take 10 mg by mouth every 6 hours as neededHistorical Med                  Asa [aspirin] and Chocolate    FAMILY HISTORY       Family History   Problem Relation Age of Onset    Hypertension Mother     Stroke Mother     High Blood Pressure Mother     Diabetes Father     Hypertension Father     High Blood Pressure Father     Heart Disease Father           SOCIAL HISTORY       Social History     Socioeconomic History    Marital status:      Spouse name: Aliya    Number of children: 2    Years of education: 15    Highest education level: None   Occupational History    Occupation:    Tobacco Use    Smoking status: Current Every Day Smoker     Packs/day: 0.25     Years: 20.00     Pack years: 5.00     Types: Cigarettes    Smokeless tobacco: Never Used   Vaping Use    Vaping Use: Never used   Substance and Sexual Activity    Alcohol use: Yes     Alcohol/week: 5.8 standard drinks     Types: 7 Standard drinks or equivalent per week     Comment: Occ    Drug use: No    Sexual activity: Yes   Other Topics Concern    None   Social History Narrative    None     Social Determinants of Health     Financial Resource Strain:     Difficulty of Paying Living Expenses:    Food Insecurity:     Worried About Running Out of Food in the Last Year:     [address] in the Last Year:    Transportation Needs:     Lack of Transportation (Medical):  Lack of Transportation (Non-Medical):    Physical Activity:     Days of Exercise per Week:     Minutes of Exercise per Session:    Stress:     Feeling of Stress :    Social Connections:     Frequency of Communication with Friends and Family:     Frequency of Social Gatherings with Friends and Family:     Attends Oriental orthodox Services:     Active Member of Clubs or Organizations:     Attends Club or Organization Meetings:     Marital Status:    Intimate Partner Violence:     Fear of Current or Ex-Partner:     Emotionally Abused:     Physically Abused:     Sexually Abused:        SCREENINGS                    PHYSICAL EXAM    (up to 7 for level 4, 8 or more for level 5)     ED Triage Vitals [08/08/21 1740]   BP Temp Temp src Pulse Resp SpO2 Height Weight   123/83 -- -- 88 18 95 % -- 215 lb (97.5 kg)       Physical Exam  Vitals and nursing note reviewed.    Constitutional: General: He is not in acute distress. Appearance: Normal appearance. He is not ill-appearing, toxic-appearing or diaphoretic. HENT:      Head: Normocephalic and atraumatic. Nose: Nose normal.   Eyes:      Extraocular Movements: Extraocular movements intact. Pupils: Pupils are equal, round, and reactive to light. Cardiovascular:      Rate and Rhythm: Normal rate and regular rhythm. Pulses: Normal pulses. Heart sounds: Normal heart sounds. Pulmonary:      Effort: Pulmonary effort is normal. No respiratory distress. Breath sounds: No stridor. No wheezing, rhonchi or rales. Chest:      Chest wall: No tenderness. Musculoskeletal:         General: Tenderness and signs of injury present. No swelling or deformity. Cervical back: Normal range of motion and neck supple. Right lower leg: No edema. Left lower leg: No edema. Comments: Slight tenderness palpation left shoulder close to the area of the Port-A-Cath which is located more laterally than left upper chest.  There is no deformity, and there is fairly good range of motion. Skin:     General: Skin is warm. Capillary Refill: Capillary refill takes less than 2 seconds. Coloration: Skin is not jaundiced or pale. Findings: No bruising, erythema, lesion or rash. Neurological:      General: No focal deficit present. Mental Status: He is alert and oriented to person, place, and time. Cranial Nerves: No cranial nerve deficit. Sensory: No sensory deficit. Motor: No weakness.       Coordination: Coordination normal.      Gait: Gait normal.      Deep Tendon Reflexes: Reflexes normal.         DIAGNOSTIC RESULTS     EKG: All EKG's are interpreted by the Emergency Department Physician who either signs orCo-signs this chart in the absence of a cardiologist.        RADIOLOGY:   plain film images such as CT, Ultrasound and MRI are read by the radiologist. Plain radiographic images are visualized and preliminarily interpreted by the emergency physician with the below findings:        Interpretation per the Radiologist below, ifavailable at the time of this note:    No orders to display         ED BEDSIDE ULTRASOUND:   Performed by ED Physician - none    LABS:  Labs Reviewed - No data to display    All other labs were within normal range ornot returned as of this dictation. EMERGENCY DEPARTMENT COURSE and DIFFERENTIAL DIAGNOSIS/MDM:   Vitals:    Vitals:    08/08/21 1740   BP: 123/83   Pulse: 88   Resp: 18   SpO2: 95%   Weight: 215 lb (97.5 kg)           MDM  Number of Diagnoses or Management Options  Diagnosis management comments: Patient and his wife eloped from the work-up was even started. I was informed by the nurse that the patient disappeared along with his wife. CRITICAL CARE TIME   Total CriticalCare time was  minutes, excluding separately reportable procedures. There was a high probability of clinically significant/life threatening deterioration in the patient's condition which required my urgent intervention. CONSULTS:  None    PROCEDURES:  Unlessotherwise noted below, none     Procedures    FINAL IMPRESSION      1. Syncope and collapse    2. Acute pain of left shoulder          DISPOSITION/PLAN   DISPOSITION Eloped - Left Before Treatment Complete 08/08/2021 07:12:30 PM      PATIENT REFERRED TO:  No follow-up provider specified.     DISCHARGE MEDICATIONS:  Discharge Medication List as of 8/8/2021  7:13 PM                 (Please note that portions of this note were completed with a voice recognition program.  Efforts were made to edit the dictations but occasionally words are mis-transcribed.)      Kathy Wolff MD (electronically signed)  Attending Emergency Physician            Kathy Wolff MD  08/10/21 [facility] [address], [place]  08/10/21 [id number]

## 2021-08-10 ENCOUNTER — TELEPHONE (OUTPATIENT)
Dept: PRIMARY CARE CLINIC | Age: 61
End: 2021-08-10

## 2021-08-10 ASSESSMENT — ENCOUNTER SYMPTOMS
EYES NEGATIVE: 1
BACK PAIN: 0
GASTROINTESTINAL NEGATIVE: 1
RESPIRATORY NEGATIVE: 1

## 2021-08-10 NOTE — TELEPHONE ENCOUNTER
[facility] Transitions Initial Follow Up Call    Outreach made within 2 business days of discharge: Yes    Patient: Michelle Avelar Patient : 1960   MRN: [id number]  Reason for Admission: There are no discharge diagnoses documented for the most recent discharge. Discharge Date: 21       Spoke with: LVM    Discharge department/facility:[facility]  TCM Interactive Patient Contact:  Was patient able to fill all prescriptions: No:LVM  Was patient instructed to bring all medications to the follow-up visit: No: LVM  Is patient taking all medications as directed in the discharge summary?  LVM  Does patient understand their discharge instructions: No: LVM  Does patient have questions or concerns that need addressed prior to 7-14 day follow up office visit: no LVM    Scheduled appointment with PCP within 7-14 days    Follow Up  Future Appointments   Date Time Provider Martell Bergman   2021  8:40 AM Scott Cason, APRN - [address]       Kiet Vernon LPN

## 2021-09-17 ENCOUNTER — TELEPHONE (OUTPATIENT)
Dept: PRIMARY CARE CLINIC | Age: 61
End: 2021-09-17

## 2021-09-24 ENCOUNTER — OFFICE VISIT (OUTPATIENT)
Dept: PRIMARY CARE CLINIC | Age: 61
End: 2021-09-24
Payer: COMMERCIAL

## 2021-09-24 VITALS
OXYGEN SATURATION: 98 % | RESPIRATION RATE: 20 BRPM | DIASTOLIC BLOOD PRESSURE: 70 MMHG | SYSTOLIC BLOOD PRESSURE: 124 MMHG | HEART RATE: 72 BPM | WEIGHT: 198.4 LBS | BODY MASS INDEX: 29.3 KG/M2 | TEMPERATURE: 97.5 F

## 2021-09-24 DIAGNOSIS — C24.0 KLATSKIN'S TUMOR (HCC): ICD-10-CM

## 2021-09-24 DIAGNOSIS — K21.9 GASTROESOPHAGEAL REFLUX DISEASE WITHOUT ESOPHAGITIS: Primary | ICD-10-CM

## 2021-09-24 DIAGNOSIS — Z12.11 SCREEN FOR COLON CANCER: Primary | ICD-10-CM

## 2021-09-24 PROCEDURE — G8427 DOCREV CUR MEDS BY ELIG CLIN: HCPCS | Performed by: NURSE PRACTITIONER

## 2021-09-24 PROCEDURE — 3017F COLORECTAL CA SCREEN DOC REV: CPT | Performed by: NURSE PRACTITIONER

## 2021-09-24 PROCEDURE — 99214 OFFICE O/P EST MOD 30 MIN: CPT | Performed by: NURSE PRACTITIONER

## 2021-09-24 PROCEDURE — G8417 CALC BMI ABV UP PARAM F/U: HCPCS | Performed by: NURSE PRACTITIONER

## 2021-09-24 PROCEDURE — 4004F PT TOBACCO SCREEN RCVD TLK: CPT | Performed by: NURSE PRACTITIONER

## 2021-09-24 RX ORDER — FAMOTIDINE 40 MG/1
40 TABLET, FILM COATED ORAL EVERY EVENING
Qty: 90 TABLET | Refills: 3 | Status: ON HOLD
Start: 2021-09-24 | End: 2022-05-16 | Stop reason: HOSPADM

## 2021-09-24 RX ORDER — PANTOPRAZOLE SODIUM 40 MG/1
40 TABLET, DELAYED RELEASE ORAL
Qty: 90 TABLET | Refills: 3 | Status: SHIPPED | OUTPATIENT
Start: 2021-09-24 | End: 2022-04-05 | Stop reason: ALTCHOICE

## 2021-09-24 SDOH — ECONOMIC STABILITY: FOOD INSECURITY: WITHIN THE PAST 12 MONTHS, THE FOOD YOU BOUGHT JUST DIDN'T LAST AND YOU DIDN'T HAVE MONEY TO GET MORE.: NEVER TRUE

## 2021-09-24 SDOH — ECONOMIC STABILITY: FOOD INSECURITY: WITHIN THE PAST 12 MONTHS, YOU WORRIED THAT YOUR FOOD WOULD RUN OUT BEFORE YOU GOT MONEY TO BUY MORE.: NEVER TRUE

## 2021-09-24 ASSESSMENT — ENCOUNTER SYMPTOMS
HOARSE VOICE: 0
WATER BRASH: 0
SHORTNESS OF BREATH: 0
STRIDOR: 0
ORTHOPNEA: 0
SORE THROAT: 0
BELCHING: 0
CHOKING: 0
WHEEZING: 0
BLURRED VISION: 0
GLOBUS SENSATION: 0
COUGH: 0
HEARTBURN: 1
NAUSEA: 0
ABDOMINAL PAIN: 0

## 2021-09-24 ASSESSMENT — SOCIAL DETERMINANTS OF HEALTH (SDOH): HOW HARD IS IT FOR YOU TO PAY FOR THE VERY BASICS LIKE FOOD, HOUSING, MEDICAL CARE, AND HEATING?: NOT HARD AT ALL

## 2021-09-24 NOTE — PATIENT INSTRUCTIONS
SURVEY:    You may be receiving a survey from [employer] regarding your visit today. Please complete the survey to enable us to provide the highest quality of care to you and your family. If you cannot score us a very good on any question, please call the office to discuss how we could have made your experience a very good one. Thank you. John Cason, APRN-DEANNA Hernandez, DEANNA Gordillo, [address], [place]  Adin Silvestre, [place]  Sridevi, PCA    Patient Education        Gastroesophageal Reflux Disease (GERD): Care Instructions  Overview     Gastroesophageal reflux disease (GERD) is the backward flow of stomach acid into the esophagus. The esophagus is the tube that leads from your throat to your stomach. A one-way valve prevents the stomach acid from backing up into this tube. But when you have GERD, this valve does not close tightly enough. This can also cause pain and swelling in your esophagus. (This is called esophagitis.)  If you have mild GERD symptoms including heartburn, you may be able to control the problem with antacids or over-the-counter medicine. You can also make lifestyle changes to help reduce your symptoms. These include changing your diet and eating habits, such as not eating late at night and losing weight. Follow-up care is a key part of your treatment and safety. Be sure to make and go to all appointments, and call your doctor if you are having problems. It's also a good idea to know your test results and keep a list of the medicines you take. How can you care for yourself at home? · Take your medicines exactly as prescribed. Call your doctor if you think you are having a problem with your medicine. · Your doctor may recommend over-the-counter medicine. For mild or occasional indigestion, antacids, such as Tums, Gaviscon, Mylanta, or Maalox, may help. Your doctor also may recommend over-the-counter acid reducers, such as famotidine (Pepcid AC), cimetidine (Tagamet HB), or omeprazole (Prilosec). Read and follow all instructions on the label. If you use these medicines often, talk with your doctor. · Change your eating habits. ? It's best to eat several small meals instead of two or three large meals. ? After you eat, wait 2 to 3 hours before you lie down. ? Chocolate, mint, and alcohol can make GERD worse. ? Spicy foods, foods that have a lot of acid (like tomatoes and oranges), and coffee can make GERD symptoms worse in some people. If your symptoms are worse after you eat a certain food, you may want to stop eating that food to see if your symptoms get better. · Do not smoke or chew tobacco. Smoking can make GERD worse. If you need help quitting, talk to your doctor about stop-smoking programs and medicines. These can increase your chances of quitting for good. · If you have GERD symptoms at night, raise the head of your bed 6 to 8 inches by putting the frame on blocks or placing a foam wedge under the head of your mattress. (Adding extra pillows does not work.)  · Do not wear tight clothing around your middle. · Lose weight if you need to. Losing just 5 to 10 pounds can help. When should you call for help? Call your doctor now or seek immediate medical care if:    · You have new or different belly pain.     · Your stools are black and tarlike or have streaks of blood. Watch closely for changes in your health, and be sure to contact your doctor if:    · Your symptoms have not improved after 2 days.     · Food seems to catch in your throat or chest.   Where can you learn more? Go to [email or web address]://[email or web address].Shout. org and sign in to your [employer] account. Enter Q510 in the [facility] box to learn more about \"Gastroesophageal Reflux Disease (GERD): Care Instructions. \"     If you do not have an account, please click on the \"Sign Up Now\" link. Current as of: February 10, 2021               Content Version: 13.0  © 3319-4551 Healthwise, Incorporated.    Care instructions adapted under license by [facility] ([facility]). If you have questions about a medical condition or this instruction, always ask your healthcare professional. [address] any warranty or liability for your use of this information.

## 2021-10-09 ENCOUNTER — APPOINTMENT (OUTPATIENT)
Dept: CT IMAGING | Age: 61
End: 2021-10-09
Payer: COMMERCIAL

## 2021-10-09 ENCOUNTER — HOSPITAL ENCOUNTER (EMERGENCY)
Age: 61
Discharge: HOME | End: 2021-10-09
Payer: COMMERCIAL

## 2021-10-09 VITALS
SYSTOLIC BLOOD PRESSURE: 120 MMHG | HEART RATE: 84 BPM | TEMPERATURE: 99 F | OXYGEN SATURATION: 95 % | WEIGHT: 180 LBS | BODY MASS INDEX: 26.58 KG/M2 | DIASTOLIC BLOOD PRESSURE: 84 MMHG | RESPIRATION RATE: 18 BRPM

## 2021-10-09 DIAGNOSIS — R11.2 NON-INTRACTABLE VOMITING WITH NAUSEA, UNSPECIFIED VOMITING TYPE: Primary | ICD-10-CM

## 2021-10-09 LAB
-: NORMAL
ABSOLUTE EOS #: 0.34 K/UL (ref 0–0.44)
ABSOLUTE IMMATURE GRANULOCYTE: 0.03 K/UL (ref 0–0.3)
ABSOLUTE LYMPH #: 2.12 K/UL (ref 1.1–3.7)
ABSOLUTE MONO #: 0.94 K/UL (ref 0.1–1.2)
ALBUMIN SERPL-MCNC: 4.4 G/DL (ref 3.5–5.2)
ALBUMIN/GLOBULIN RATIO: 1.4 (ref 1–2.5)
ALP BLD-CCNC: 110 U/L (ref 40–129)
ALT SERPL-CCNC: 23 U/L (ref 5–41)
AMORPHOUS: NORMAL
ANION GAP SERPL CALCULATED.3IONS-SCNC: 16 MMOL/L (ref 9–17)
AST SERPL-CCNC: 23 U/L
BACTERIA: NORMAL
BASOPHILS # BLD: 1 % (ref 0–2)
BASOPHILS ABSOLUTE: 0.07 K/UL (ref 0–0.2)
BILIRUB SERPL-MCNC: 0.62 MG/DL (ref 0.3–1.2)
BILIRUBIN URINE: ABNORMAL
BUN BLDV-MCNC: 20 MG/DL (ref 6–20)
BUN/CREAT BLD: 23 (ref 9–20)
CALCIUM SERPL-MCNC: 9.7 MG/DL (ref 8.6–10.4)
CASTS UA: NORMAL /LPF
CHLORIDE BLD-SCNC: 89 MMOL/L (ref 98–107)
CHP ED QC CHECK: YES
CO2: 34 MMOL/L (ref 20–31)
COLOR: YELLOW
COMMENT UA: ABNORMAL
CREAT SERPL-MCNC: 0.87 MG/DL (ref 0.7–1.2)
CRYSTALS, UA: NORMAL /HPF
DIFFERENTIAL TYPE: ABNORMAL
EOSINOPHILS RELATIVE PERCENT: 3 % (ref 1–4)
EPITHELIAL CELLS UA: NORMAL /HPF (ref 0–5)
GFR AFRICAN AMERICAN: >60 ML/MIN
GFR NON-AFRICAN AMERICAN: >60 ML/MIN
GFR SERPL CREATININE-BSD FRML MDRD: ABNORMAL ML/MIN/{1.73_M2}
GFR SERPL CREATININE-BSD FRML MDRD: ABNORMAL ML/MIN/{1.73_M2}
GLUCOSE BLD-MCNC: 103 MG/DL (ref 70–99)
GLUCOSE URINE: NEGATIVE
HCT VFR BLD CALC: 47.9 % (ref 40.7–50.3)
HEMOCCULT STL QL: POSITIVE
HEMOGLOBIN: 16.9 G/DL (ref 13–17)
IMMATURE GRANULOCYTES: 0 %
KETONES, URINE: ABNORMAL
LACTIC ACID: 1.2 MMOL/L (ref 0.5–2.2)
LEUKOCYTE ESTERASE, URINE: NEGATIVE
LIPASE: 41 U/L (ref 13–60)
LYMPHOCYTES # BLD: 17 % (ref 24–43)
MAGNESIUM: 1.9 MG/DL (ref 1.6–2.6)
MCH RBC QN AUTO: 34 PG (ref 25.2–33.5)
MCHC RBC AUTO-ENTMCNC: 35.3 G/DL (ref 28.4–34.8)
MCV RBC AUTO: 96.4 FL (ref 82.6–102.9)
MONOCYTES # BLD: 8 % (ref 3–12)
MUCUS: NORMAL
NITRITE, URINE: NEGATIVE
NRBC AUTOMATED: 0 PER 100 WBC
OTHER OBSERVATIONS UA: NORMAL
PDW BLD-RTO: 11.8 % (ref 11.8–14.4)
PH UA: 7 (ref 5–9)
PLATELET # BLD: 222 K/UL (ref 138–453)
PLATELET ESTIMATE: ABNORMAL
PMV BLD AUTO: 8.9 FL (ref 8.1–13.5)
POTASSIUM SERPL-SCNC: 3.2 MMOL/L (ref 3.7–5.3)
PROTEIN UA: NEGATIVE
RBC # BLD: 4.97 M/UL (ref 4.21–5.77)
RBC # BLD: ABNORMAL 10*6/UL
RBC UA: NORMAL /HPF (ref 0–2)
RENAL EPITHELIAL, UA: NORMAL /HPF
SEG NEUTROPHILS: 71 % (ref 36–65)
SEGMENTED NEUTROPHILS ABSOLUTE COUNT: 8.8 K/UL (ref 1.5–8.1)
SODIUM BLD-SCNC: 139 MMOL/L (ref 135–144)
SPECIFIC GRAVITY UA: 1.01 (ref 1.01–1.02)
TOTAL PROTEIN: 7.5 G/DL (ref 6.4–8.3)
TRICHOMONAS: NORMAL
TURBIDITY: CLEAR
URINE HGB: NEGATIVE
UROBILINOGEN, URINE: ABNORMAL
WBC # BLD: 12.3 K/UL (ref 3.5–11.3)
WBC # BLD: ABNORMAL 10*3/UL
WBC UA: NORMAL /HPF (ref 0–5)
YEAST: NORMAL

## 2021-10-09 PROCEDURE — 99283 EMERGENCY DEPT VISIT LOW MDM: CPT

## 2021-10-09 PROCEDURE — 83690 ASSAY OF LIPASE: CPT

## 2021-10-09 PROCEDURE — 85025 COMPLETE CBC W/AUTO DIFF WBC: CPT

## 2021-10-09 PROCEDURE — 96375 TX/PRO/DX INJ NEW DRUG ADDON: CPT

## 2021-10-09 PROCEDURE — 2500000003 HC RX 250 WO HCPCS: Performed by: PHYSICIAN ASSISTANT

## 2021-10-09 PROCEDURE — 83735 ASSAY OF MAGNESIUM: CPT

## 2021-10-09 PROCEDURE — 6370000000 HC RX 637 (ALT 250 FOR IP): Performed by: PHYSICIAN ASSISTANT

## 2021-10-09 PROCEDURE — 36415 COLL VENOUS BLD VENIPUNCTURE: CPT

## 2021-10-09 PROCEDURE — 86301 IMMUNOASSAY TUMOR CA 19-9: CPT

## 2021-10-09 PROCEDURE — 82378 CARCINOEMBRYONIC ANTIGEN: CPT

## 2021-10-09 PROCEDURE — 81001 URINALYSIS AUTO W/SCOPE: CPT

## 2021-10-09 PROCEDURE — 6360000004 HC RX CONTRAST MEDICATION: Performed by: PHYSICIAN ASSISTANT

## 2021-10-09 PROCEDURE — 96374 THER/PROPH/DIAG INJ IV PUSH: CPT

## 2021-10-09 PROCEDURE — 83605 ASSAY OF LACTIC ACID: CPT

## 2021-10-09 PROCEDURE — 6360000002 HC RX W HCPCS: Performed by: PHYSICIAN ASSISTANT

## 2021-10-09 PROCEDURE — 2580000003 HC RX 258: Performed by: PHYSICIAN ASSISTANT

## 2021-10-09 PROCEDURE — 74177 CT ABD & PELVIS W/CONTRAST: CPT

## 2021-10-09 PROCEDURE — 80053 COMPREHEN METABOLIC PANEL: CPT

## 2021-10-09 RX ORDER — ONDANSETRON 4 MG/1
4 TABLET, ORALLY DISINTEGRATING ORAL EVERY 8 HOURS PRN
Qty: 20 TABLET | Refills: 0 | Status: SHIPPED | OUTPATIENT
Start: 2021-10-09 | End: 2021-11-11 | Stop reason: SDUPTHER

## 2021-10-09 RX ORDER — METOCLOPRAMIDE 10 MG/1
10 TABLET ORAL ONCE
Status: COMPLETED | OUTPATIENT
Start: 2021-10-09 | End: 2021-10-09

## 2021-10-09 RX ORDER — ONDANSETRON 2 MG/ML
4 INJECTION INTRAMUSCULAR; INTRAVENOUS ONCE
Status: COMPLETED | OUTPATIENT
Start: 2021-10-09 | End: 2021-10-09

## 2021-10-09 RX ORDER — METOCLOPRAMIDE 10 MG/1
10 TABLET ORAL 3 TIMES DAILY
Qty: 40 TABLET | Refills: 0 | Status: SHIPPED | OUTPATIENT
Start: 2021-10-09 | End: 2021-11-29 | Stop reason: SDUPTHER

## 2021-10-09 RX ORDER — 0.9 % SODIUM CHLORIDE 0.9 %
1000 INTRAVENOUS SOLUTION INTRAVENOUS ONCE
Status: COMPLETED | OUTPATIENT
Start: 2021-10-09 | End: 2021-10-09

## 2021-10-09 RX ORDER — METOCLOPRAMIDE HYDROCHLORIDE 5 MG/ML
5 INJECTION INTRAMUSCULAR; INTRAVENOUS ONCE
Status: COMPLETED | OUTPATIENT
Start: 2021-10-09 | End: 2021-10-09

## 2021-10-09 RX ORDER — ONDANSETRON 4 MG/1
4 TABLET, FILM COATED ORAL ONCE
Status: COMPLETED | OUTPATIENT
Start: 2021-10-09 | End: 2021-10-09

## 2021-10-09 RX ADMIN — FAMOTIDINE 20 MG: 10 INJECTION, SOLUTION INTRAVENOUS at 15:47

## 2021-10-09 RX ADMIN — ONDANSETRON HYDROCHLORIDE 4 MG: 4 TABLET, FILM COATED ORAL at 19:22

## 2021-10-09 RX ADMIN — SODIUM CHLORIDE 1000 ML: 9 INJECTION, SOLUTION INTRAVENOUS at 15:44

## 2021-10-09 RX ADMIN — METOCLOPRAMIDE 10 MG: 10 TABLET ORAL at 19:22

## 2021-10-09 RX ADMIN — ONDANSETRON 4 MG: 2 INJECTION INTRAMUSCULAR; INTRAVENOUS at 15:47

## 2021-10-09 RX ADMIN — METOCLOPRAMIDE 5 MG: 5 INJECTION, SOLUTION INTRAMUSCULAR; INTRAVENOUS at 18:05

## 2021-10-09 RX ADMIN — IOPAMIDOL 75 ML: 755 INJECTION, SOLUTION INTRAVENOUS at 16:39

## 2021-10-09 ASSESSMENT — PAIN DESCRIPTION - DESCRIPTORS: DESCRIPTORS: ACHING

## 2021-10-09 ASSESSMENT — PAIN DESCRIPTION - LOCATION: LOCATION: ABDOMEN

## 2021-10-09 ASSESSMENT — ENCOUNTER SYMPTOMS
ABDOMINAL PAIN: 1
VOMITING: 1

## 2021-10-09 ASSESSMENT — PAIN SCALES - GENERAL: PAINLEVEL_OUTOF10: 2

## 2021-10-09 ASSESSMENT — PAIN DESCRIPTION - PAIN TYPE: TYPE: ACUTE PAIN

## 2021-10-09 NOTE — ED PROVIDER NOTES
[facility] ED  eMERGENCY dEPARTMENT eNCOUnter      Pt Name: Adrienne Mayer  MRN: [id number]  [place] 1960  Date of evaluation: 10/9/21      CHIEF COMPLAINT       Chief Complaint   Patient presents with    Abdominal Pain     onset for the past couple days    Emesis     onset about 2 weeks ago. Pt states he had a CT  Thursday and it said he had an obstruction         HISTORY OF PRESENT ILLNESS    Adrienne Mayer is a 63 y.o. male who presents complaining of nausea and vomiting  The history is provided by the patient. Abdominal Pain  Pain location:  Generalized  Pain quality: aching    Pain radiates to:  Does not radiate  Pain severity:  Mild  Onset quality:  Gradual  Duration:  2 days  Timing:  Intermittent  Progression:  Waxing and waning  Chronicity:  Recurrent  Context: laxative use and previous surgery    Context: not medication withdrawal, not recent illness, not recent travel, not retching and not suspicious food intake    Relieved by:  Nothing  Worsened by:  Nothing  Ineffective treatments:  None tried  Associated symptoms: melena and vomiting    Associated symptoms: no chills and no fever    Risk factors: multiple surgeries    Risk factors comment:  Hx of cancer, he has recurrent n/v, he has diffuse upper abd pain      REVIEW OF SYSTEMS       Review of Systems   Constitutional: Negative for chills and fever. Gastrointestinal: Positive for abdominal pain, melena and vomiting. All other systems reviewed and are negative. PAST MEDICAL HISTORY     Past Medical History:   Diagnosis Date    Anxiety     Chronic back pain     Depression     Gastroesophageal reflux disease without esophagitis 8/6/2020    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[facility] [address] 8/7/2020       SURGICAL HISTORY       Past Surgical History:   Procedure Laterality Date    ERCP  08/07/2020    stent insertion, cholangiopancreatography  with brushings.     ERCP  8/7/2020    ERCP STENT INSERTION performed by Cally Kwon MD at [address] [place] [address] range of motion. Skin:     General: Skin is warm and dry. Capillary Refill: Capillary refill takes less than 2 seconds. Neurological:      Mental Status: He is alert and oriented to person, place, and time. MEDICAL DECISION MAKING:     Stool positive for ob, has appt with surgeon on weds. Rosa Elena Wells on [place] on Thursday  I spoke with the surgeon on-call Dr. Rachele Lisa and and he would like to have the cancer markers are repeated while he is here today. I will order those. I will send him home with some Reglan and Zofran with a prescription for each. I encouraged him to increase his clear liquids small amount frequently advance his diet as tolerated avoid any milk products sugary products spicy foods. He is to follow-up with Dr. Rosa Elena Wells on Wednesday and Dr. Rohit More on Thursday if his symptoms worsen he is to return to the emergency department. DIAGNOSTIC RESULTS     EKG: All EKG's are interpreted by the Emergency Department Physician who either signs or Co-signs this chart in the absence of acardiologist.        RADIOLOGY:Allplain film, CT, MRI, and formal ultrasound images (except ED bedside ultrasound) are read by the radiologist and the images and interpretations are directly viewed by the emergency physician. Impression   1. Moderate gastric distension with mild inflammation surrounding the gastric   antrum.  The differential diagnosis includes gastritis, stricture or ulcer   disease; correlate with endoscopy. 2. Abnormal soft tissue attenuation along the hepatic flexure could be due to   underdistention of the colon, but cannot exclude metastatic disease. 3. Trace right lower quadrant ascites.             LABS:All lab results were reviewed by myself, and all abnormals are listed below.   Labs Reviewed   CBC WITH AUTO DIFFERENTIAL - Abnormal; Notable for the following components:       Result Value    WBC 12.3 (*)     MCH 34.0 (*)     MCHC 35.3 (*)     Seg Neutrophils 71 (*) Lymphocytes 17 (*)     Segs Absolute 8.80 (*)     All other components within normal limits   COMPREHENSIVE METABOLIC PANEL W/ REFLEX TO MG FOR LOW K - Abnormal; Notable for the following components:    Glucose 103 (*)     Bun/Cre Ratio 23 (*)     Potassium 3.2 (*)     Chloride 89 (*)     CO2 34 (*)     All other components within normal limits   POCT OCCULT BLOOD STOOL COLON CA SCREEN 1-3 - Normal   LIPASE   LACTIC ACID   MAGNESIUM   URINE RT REFLEX TO CULTURE   CEA   CANCER ANTIGEN 19-9         EMERGENCY DEPARTMENT COURSE:   Vitals:    Vitals:    10/09/21 1452 10/09/21 1802   BP: 108/87 126/82   Pulse: 102 84   Resp: 18 18   Temp: 99 °F (37.2 °C)    TempSrc: Tympanic    SpO2: 95% 95%   Weight: 180 lb (81.6 kg)        The patient was given the following medications while in the emergency department:  Orders Placed This Encounter   Medications    0.9 % sodium chloride bolus    ondansetron (ZOFRAN) injection 4 mg    famotidine (PEPCID) injection 20 mg    iopamidol (ISOVUE-370) 76 % injection 75 mL    metoclopramide (REGLAN) injection 5 mg    metoclopramide (REGLAN) tablet 10 mg    ondansetron (ZOFRAN) tablet 4 mg    metoclopramide (REGLAN) 10 MG tablet     Sig: Take 1 tablet by mouth 3 times daily     Dispense:  40 tablet     Refill:  0    ondansetron (ZOFRAN ODT) 4 MG disintegrating tablet     Sig: Take 1 tablet by mouth every 8 hours as needed for Nausea or Vomiting     Dispense:  20 tablet     Refill:  0       -------------------------      CRITICAL CARE:     CONSULTS:  None    PROCEDURES:  Procedures    FINAL IMPRESSION      1.  Non-intractable vomiting with nausea, unspecified vomiting type          DISPOSITION/PLAN   DISPOSITION Decision To Discharge 10/09/2021 07:27:12 PM      PATIENT REFERREDTO:  Blanca Rollins MD  [facility] [address]  [phone]    Schedule an appointment as soon as possible for a visit in [address] [place] [address],[address] [facility]  [phone]    If symptoms worsen      DISCHARGEMEDICATIONS:  New Prescriptions    METOCLOPRAMIDE (REGLAN) 10 MG TABLET    Take 1 tablet by mouth 3 times daily    ONDANSETRON (ZOFRAN ODT) 4 MG DISINTEGRATING TABLET    Take 1 tablet by mouth every 8 hours as needed for Nausea or Vomiting       (Please note that portions of this note were completed with a voice recognition program.  Efforts were made to edit thedictations but occasionally words are mis-transcribed.)    ROHINI Paulino PA-C  10/09/21 1944

## 2021-10-10 ENCOUNTER — APPOINTMENT (OUTPATIENT)
Dept: GENERAL RADIOLOGY | Age: 61
End: 2021-10-10
Payer: COMMERCIAL

## 2021-10-10 ENCOUNTER — HOSPITAL ENCOUNTER (EMERGENCY)
Age: 61
Discharge: HOME | End: 2021-10-10
Payer: COMMERCIAL

## 2021-10-10 VITALS
DIASTOLIC BLOOD PRESSURE: 91 MMHG | HEART RATE: 87 BPM | SYSTOLIC BLOOD PRESSURE: 117 MMHG | OXYGEN SATURATION: 99 % | RESPIRATION RATE: 18 BRPM | TEMPERATURE: 97.2 F

## 2021-10-10 DIAGNOSIS — M25.571 ACUTE RIGHT ANKLE PAIN: Primary | ICD-10-CM

## 2021-10-10 DIAGNOSIS — R11.2 NON-INTRACTABLE VOMITING WITH NAUSEA, UNSPECIFIED VOMITING TYPE: ICD-10-CM

## 2021-10-10 LAB
ABSOLUTE EOS #: 0.19 K/UL (ref 0–0.44)
ABSOLUTE IMMATURE GRANULOCYTE: 0.04 K/UL (ref 0–0.3)
ABSOLUTE LYMPH #: 1.57 K/UL (ref 1.1–3.7)
ABSOLUTE MONO #: 1.16 K/UL (ref 0.1–1.2)
ALBUMIN SERPL-MCNC: 3.9 G/DL (ref 3.5–5.2)
ALBUMIN/GLOBULIN RATIO: 1.3 (ref 1–2.5)
ALP BLD-CCNC: 99 U/L (ref 40–129)
ALT SERPL-CCNC: 18 U/L (ref 5–41)
ANION GAP SERPL CALCULATED.3IONS-SCNC: 11 MMOL/L (ref 9–17)
AST SERPL-CCNC: 18 U/L
BASOPHILS # BLD: 0 % (ref 0–2)
BASOPHILS ABSOLUTE: 0.03 K/UL (ref 0–0.2)
BILIRUB SERPL-MCNC: 0.79 MG/DL (ref 0.3–1.2)
BUN BLDV-MCNC: 18 MG/DL (ref 6–20)
BUN/CREAT BLD: 21 (ref 9–20)
CA 19-9: 147 U/ML (ref 0–35)
CALCIUM SERPL-MCNC: 8.8 MG/DL (ref 8.6–10.4)
CARCINOEMBRYONIC ANTIGEN: 2.9 NG/ML
CHLORIDE BLD-SCNC: 90 MMOL/L (ref 98–107)
CO2: 34 MMOL/L (ref 20–31)
CREAT SERPL-MCNC: 0.84 MG/DL (ref 0.7–1.2)
D-DIMER QUANTITATIVE: 0.47 MG/L FEU (ref 0–0.59)
DIFFERENTIAL TYPE: ABNORMAL
EOSINOPHILS RELATIVE PERCENT: 1 % (ref 1–4)
GFR AFRICAN AMERICAN: >60 ML/MIN
GFR NON-AFRICAN AMERICAN: >60 ML/MIN
GFR SERPL CREATININE-BSD FRML MDRD: ABNORMAL ML/MIN/{1.73_M2}
GFR SERPL CREATININE-BSD FRML MDRD: ABNORMAL ML/MIN/{1.73_M2}
GLUCOSE BLD-MCNC: 96 MG/DL (ref 70–99)
HCT VFR BLD CALC: 43.4 % (ref 40.7–50.3)
HEMOGLOBIN: 15.2 G/DL (ref 13–17)
IMMATURE GRANULOCYTES: 0 %
LIPASE: 36 U/L (ref 13–60)
LYMPHOCYTES # BLD: 12 % (ref 24–43)
MAGNESIUM: 2 MG/DL (ref 1.6–2.6)
MCH RBC QN AUTO: 34.2 PG (ref 25.2–33.5)
MCHC RBC AUTO-ENTMCNC: 35 G/DL (ref 28.4–34.8)
MCV RBC AUTO: 97.5 FL (ref 82.6–102.9)
MONOCYTES # BLD: 9 % (ref 3–12)
NRBC AUTOMATED: 0 PER 100 WBC
PDW BLD-RTO: 11.9 % (ref 11.8–14.4)
PLATELET # BLD: 175 K/UL (ref 138–453)
PLATELET ESTIMATE: ABNORMAL
PMV BLD AUTO: 9 FL (ref 8.1–13.5)
POTASSIUM SERPL-SCNC: 3 MMOL/L (ref 3.7–5.3)
RBC # BLD: 4.45 M/UL (ref 4.21–5.77)
RBC # BLD: ABNORMAL 10*6/UL
SEG NEUTROPHILS: 78 % (ref 36–65)
SEGMENTED NEUTROPHILS ABSOLUTE COUNT: 10.29 K/UL (ref 1.5–8.1)
SODIUM BLD-SCNC: 135 MMOL/L (ref 135–144)
TOTAL PROTEIN: 6.8 G/DL (ref 6.4–8.3)
URIC ACID: 6.2 MG/DL (ref 3.4–7)
WBC # BLD: 13.3 K/UL (ref 3.5–11.3)
WBC # BLD: ABNORMAL 10*3/UL

## 2021-10-10 PROCEDURE — 99283 EMERGENCY DEPT VISIT LOW MDM: CPT

## 2021-10-10 PROCEDURE — 83690 ASSAY OF LIPASE: CPT

## 2021-10-10 PROCEDURE — 73610 X-RAY EXAM OF ANKLE: CPT

## 2021-10-10 PROCEDURE — 80053 COMPREHEN METABOLIC PANEL: CPT

## 2021-10-10 PROCEDURE — 85025 COMPLETE CBC W/AUTO DIFF WBC: CPT

## 2021-10-10 PROCEDURE — 96375 TX/PRO/DX INJ NEW DRUG ADDON: CPT

## 2021-10-10 PROCEDURE — 85379 FIBRIN DEGRADATION QUANT: CPT

## 2021-10-10 PROCEDURE — 73630 X-RAY EXAM OF FOOT: CPT

## 2021-10-10 PROCEDURE — 96374 THER/PROPH/DIAG INJ IV PUSH: CPT

## 2021-10-10 PROCEDURE — 84550 ASSAY OF BLOOD/URIC ACID: CPT

## 2021-10-10 PROCEDURE — 36415 COLL VENOUS BLD VENIPUNCTURE: CPT

## 2021-10-10 PROCEDURE — 6360000002 HC RX W HCPCS: Performed by: PHYSICIAN ASSISTANT

## 2021-10-10 PROCEDURE — 6370000000 HC RX 637 (ALT 250 FOR IP): Performed by: PHYSICIAN ASSISTANT

## 2021-10-10 PROCEDURE — 2580000003 HC RX 258: Performed by: PHYSICIAN ASSISTANT

## 2021-10-10 PROCEDURE — 83735 ASSAY OF MAGNESIUM: CPT

## 2021-10-10 RX ORDER — METOCLOPRAMIDE HYDROCHLORIDE 5 MG/ML
5 INJECTION INTRAMUSCULAR; INTRAVENOUS ONCE
Status: COMPLETED | OUTPATIENT
Start: 2021-10-10 | End: 2021-10-10

## 2021-10-10 RX ORDER — SODIUM CHLORIDE 9 MG/ML
1000 INJECTION, SOLUTION INTRAVENOUS CONTINUOUS
Status: DISCONTINUED | OUTPATIENT
Start: 2021-10-10 | End: 2021-10-10 | Stop reason: HOSPADM

## 2021-10-10 RX ORDER — ONDANSETRON 2 MG/ML
4 INJECTION INTRAMUSCULAR; INTRAVENOUS ONCE
Status: COMPLETED | OUTPATIENT
Start: 2021-10-10 | End: 2021-10-10

## 2021-10-10 RX ORDER — POTASSIUM CHLORIDE 20 MEQ/1
20 TABLET, EXTENDED RELEASE ORAL DAILY
Qty: 7 TABLET | Refills: 0 | Status: SHIPPED | OUTPATIENT
Start: 2021-10-10 | End: 2022-04-05 | Stop reason: ALTCHOICE

## 2021-10-10 RX ORDER — POTASSIUM CHLORIDE 20 MEQ/1
40 TABLET, EXTENDED RELEASE ORAL ONCE
Status: COMPLETED | OUTPATIENT
Start: 2021-10-10 | End: 2021-10-10

## 2021-10-10 RX ADMIN — METOCLOPRAMIDE 5 MG: 5 INJECTION, SOLUTION INTRAMUSCULAR; INTRAVENOUS at 13:32

## 2021-10-10 RX ADMIN — SODIUM CHLORIDE 1000 ML: 9 INJECTION, SOLUTION INTRAVENOUS at 15:59

## 2021-10-10 RX ADMIN — ONDANSETRON 4 MG: 2 INJECTION INTRAMUSCULAR; INTRAVENOUS at 13:32

## 2021-10-10 RX ADMIN — POTASSIUM CHLORIDE 40 MEQ: 1500 TABLET, EXTENDED RELEASE ORAL at 15:56

## 2021-10-10 ASSESSMENT — PAIN SCALES - GENERAL: PAINLEVEL_OUTOF10: 7

## 2021-10-10 ASSESSMENT — PAIN DESCRIPTION - ORIENTATION: ORIENTATION: RIGHT

## 2021-10-10 ASSESSMENT — PAIN DESCRIPTION - FREQUENCY: FREQUENCY: CONTINUOUS

## 2021-10-10 ASSESSMENT — PAIN DESCRIPTION - LOCATION: LOCATION: ANKLE

## 2021-10-10 ASSESSMENT — ENCOUNTER SYMPTOMS
VOMITING: 1
NAUSEA: 1

## 2021-10-10 ASSESSMENT — PAIN DESCRIPTION - DESCRIPTORS: DESCRIPTORS: ACHING;SHARP

## 2021-10-10 ASSESSMENT — PAIN DESCRIPTION - PAIN TYPE: TYPE: ACUTE PAIN

## 2021-10-10 NOTE — ED PROVIDER NOTES
[facility] ED  eMERGENCY dEPARTMENT eNCOUnter      Pt Name: Janna Brown  MRN: [id number]  [place] 1960  Date of evaluation: 10/10/21      CHIEF COMPLAINT       Chief Complaint   Patient presents with    Ankle Pain     rt ankle pain    Emesis         HISTORY OF PRESENT ILLNESS    Janna Brown is a 63 y.o. male who presents complaining of ankle pain and emesis, he did not get his rx for reglan or zofran filled from the pharmacy. The history is provided by the patient. Ankle Problem  Location:  Ankle  Time since incident:  1 day  Injury: yes (He bumped his right medial ankle against the corner of bed and now he has some pain and some mild redness to the right ankle and medial aspect of the foot)    Ankle location:  R ankle  Pain details:     Quality:  Aching    Radiates to:  Does not radiate    Severity:  Moderate    Onset quality:  Sudden    Duration:  1 day    Timing:  Intermittent    Progression:  Waxing and waning  Chronicity:  New  Dislocation: no    Foreign body present:  No foreign bodies  Tetanus status:  Unknown  Prior injury to area:  Unable to specify  Relieved by:  Nothing  Worsened by:  Nothing  Ineffective treatments:  None tried  Associated symptoms: decreased ROM and swelling    Associated symptoms comment:  He was seen last night for nausea and vomiting he states that it is better than it has been. He had a small amount of emesis. He is also complaining of right ankle pain after he bumped it against the edge of his bed. He has some mild redness it hurts to touch and painful to walk on      REVIEW OF SYSTEMS       Review of Systems   Gastrointestinal: Positive for nausea and vomiting. Musculoskeletal: Positive for arthralgias. All other systems reviewed and are negative.       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  ERCP  8/7/2020    ERCP STENT INSERTION performed by Cuate Villarreal MD at [address] 09/18/2020    left subclavian port insertion    PORT SURGERY Left 9/18/2020    PORT INSERTION, SUBCLAVIAN, W/C-ARM performed by Mariela Mera DO at [facility]       Previous Medications    FAMOTIDINE (PEPCID) 40 MG TABLET    Take 1 tablet by mouth every evening    METOCLOPRAMIDE (REGLAN) 10 MG TABLET    Take 1 tablet by mouth 3 times daily    MULTIPLE VITAMINS-MINERALS (THERAPEUTIC MULTIVITAMIN-MINERALS) TABLET    Take 1 tablet by mouth daily    ONDANSETRON (ZOFRAN ODT) 4 MG DISINTEGRATING TABLET    Take 1 tablet by mouth every 8 hours as needed for Nausea or Vomiting    PANTOPRAZOLE (PROTONIX) 40 MG TABLET    Take 1 tablet by mouth every morning (before breakfast)    PROBIOTIC PRODUCT (PROBIOTIC-10 PO)    Take by mouth daily       ALLERGIES     is allergic to asa [aspirin] and chocolate. FAMILY HISTORY     He indicated that his mother is alive. He indicated that his father is alive. SOCIAL HISTORY      reports that he has been smoking cigarettes. He has a 5.00 pack-year smoking history. He has never used smokeless tobacco. He reports current alcohol use of about 5.8 standard drinks of alcohol per week. He reports that he does not use drugs. PHYSICAL EXAM     INITIAL VITALS: BP (!) 117/91   Pulse 87   Temp 97.2 °F (36.2 °C) (Tympanic)   Resp 18   SpO2 99%      Physical Exam  Vitals and nursing note reviewed. Constitutional:       Appearance: He is well-developed. HENT:      Head: Normocephalic and atraumatic. Cardiovascular:      Rate and Rhythm: Normal rate and regular rhythm. Heart sounds: Normal heart sounds.    Pulmonary:      Effort: Pulmonary effort is normal.      Breath sounds: Normal breath sounds. Abdominal:      General: Abdomen is flat. Palpations: Abdomen is soft. Musculoskeletal:         General: Tenderness and signs of injury present. Comments: He has some mild swelling noted to the medial ankle and medial foot there is no obvious deformity there is some slight redness noted there appears that he did have a contusion at one point. Pedal pulses are palpable he has brisk capillary refill skin is pink warm and dry. Skin:     Capillary Refill: Capillary refill takes less than 2 seconds. Neurological:      Mental Status: He is alert and oriented to person, place, and time. MEDICAL DECISION MAKING:         DIAGNOSTIC RESULTS     EKG: All EKG's are interpreted by the Emergency Department Physician who either signs or Co-signs this chart in the absence of acardiologist.        RADIOLOGY:Allplain film, CT, MRI, and formal ultrasound images (except ED bedside ultrasound) are read by the radiologist and the images and interpretations are directly viewed by the emergency physician. Impression   1. Moderate gastric distension with mild inflammation surrounding the gastric   antrum.  The differential diagnosis includes gastritis, stricture or ulcer   disease; correlate with endoscopy. 2. Abnormal soft tissue attenuation along the hepatic flexure could be due to   underdistention of the colon, but cannot exclude metastatic disease. 3. Trace right lower quadrant ascites. LABS:All lab results were reviewed by myself, and all abnormals are listed below.   Labs Reviewed   CBC WITH AUTO DIFFERENTIAL - Abnormal; Notable for the following components:       Result Value    WBC 13.3 (*)     MCH 34.2 (*)     MCHC 35.0 (*)     Seg Neutrophils 78 (*)     Lymphocytes 12 (*)     Segs Absolute 10.29 (*)     All other components within normal limits   COMPREHENSIVE METABOLIC PANEL W/ REFLEX TO MG FOR LOW K - Abnormal; Notable for the following components:    Bun/Cre Ratio 21 (*)     Potassium 3.0 (*)     Chloride 90 (*)     CO2 34 (*)     All other components within normal limits   LIPASE   URIC ACID   D-DIMER, QUANTITATIVE   MAGNESIUM         EMERGENCY DEPARTMENT COURSE:   Vitals:    Vitals:    10/10/21 1032   BP: (!) 117/91   Pulse: 87   Resp: 18   Temp: 97.2 °F (36.2 °C)   TempSrc: Tympanic   SpO2: 99%       The patient was given the following medications while in the emergency department:  Orders Placed This Encounter   Medications    0.9 % sodium chloride infusion    metoclopramide (REGLAN) injection 5 mg    ondansetron (ZOFRAN) injection 4 mg    potassium chloride (KLOR-CON M) extended release tablet 40 mEq    0.9 % sodium chloride infusion    potassium chloride (KLOR-CON M) 20 MEQ extended release tablet     Sig: Take 1 tablet by mouth daily     Dispense:  7 tablet     Refill:  0       -------------------------      CRITICAL CARE:     CONSULTS:  None    PROCEDURES:  Procedures    FINAL IMPRESSION      1. Acute right ankle pain    2.  Non-intractable vomiting with nausea, unspecified vomiting type          DISPOSITION/PLAN   DISPOSITION Decision To Discharge 10/10/2021 05:22:19 PM      PATIENT REFERREDTO:  JUAN CARLOS Rodriguez CNP  [facility] [address]  [phone]    Schedule an appointment as soon as possible for a visit in 2 days      [facility] ED  [address] [facility]  [phone]    If symptoms worsen      DISCHARGEMEDICATIONS:  New Prescriptions    POTASSIUM CHLORIDE (KLOR-CON M) 20 MEQ EXTENDED RELEASE TABLET    Take 1 tablet by mouth daily       (Please note that portions of this note were completed with a voice recognition program.  Efforts were made to edit thedictations but occasionally words are mis-transcribed.)    ROHINI Manuel PA-C  10/10/21 9335

## 2021-10-10 NOTE — ED NOTES
Patient verbalized understanding that prescriptions were sent to pharmacy. Aware to keep follow up with GI next week.       Nicko Bond RN  10/09/21 2012

## 2021-10-11 PROBLEM — R11.2 NAUSEA AND VOMITING: Status: ACTIVE | Noted: 2021-10-11

## 2021-10-11 PROBLEM — R10.9 ABDOMINAL PAIN: Status: ACTIVE | Noted: 2021-10-11

## 2021-10-12 ENCOUNTER — TELEPHONE (OUTPATIENT)
Dept: PRIMARY CARE CLINIC | Age: 61
End: 2021-10-12

## 2021-10-15 PROBLEM — J96.90 RESPIRATORY FAILURE (HCC): Status: ACTIVE | Noted: 2021-10-15

## 2021-10-15 PROBLEM — J69.0 ASPIRATION PNEUMONIA DUE TO INHALATION OF VOMITUS (HCC): Status: ACTIVE | Noted: 2021-10-15

## 2021-10-18 ENCOUNTER — TELEPHONE (OUTPATIENT)
Dept: PRIMARY CARE CLINIC | Age: 61
End: 2021-10-18

## 2021-10-18 NOTE — TELEPHONE ENCOUNTER
Aliya called to update you on Ross's current situation. He was having an EGD on Friday and aspirated on vomit. He was put on a vent and was [facility] to [facility]. He is still inpatient and they want to do another EGD.   Records are to be sent here per Aliya.

## 2021-10-19 PROBLEM — J69.0 ASPIRATION PNEUMONIA (HCC): Status: ACTIVE | Noted: 2021-10-19

## 2021-11-11 ENCOUNTER — OFFICE VISIT (OUTPATIENT)
Dept: PRIMARY CARE CLINIC | Age: 61
End: 2021-11-11
Payer: COMMERCIAL

## 2021-11-11 VITALS
RESPIRATION RATE: 18 BRPM | HEART RATE: 100 BPM | TEMPERATURE: 98.1 F | WEIGHT: 176.2 LBS | HEIGHT: 69 IN | BODY MASS INDEX: 26.1 KG/M2 | SYSTOLIC BLOOD PRESSURE: 112 MMHG | OXYGEN SATURATION: 98 % | DIASTOLIC BLOOD PRESSURE: 72 MMHG

## 2021-11-11 DIAGNOSIS — C24.0 KLATSKIN'S TUMOR (HCC): Primary | ICD-10-CM

## 2021-11-11 DIAGNOSIS — C16.9 MALIGNANT NEOPLASM OF STOMACH, UNSPECIFIED LOCATION (HCC): ICD-10-CM

## 2021-11-11 PROCEDURE — G8427 DOCREV CUR MEDS BY ELIG CLIN: HCPCS | Performed by: NURSE PRACTITIONER

## 2021-11-11 PROCEDURE — 4004F PT TOBACCO SCREEN RCVD TLK: CPT | Performed by: NURSE PRACTITIONER

## 2021-11-11 PROCEDURE — G8484 FLU IMMUNIZE NO ADMIN: HCPCS | Performed by: NURSE PRACTITIONER

## 2021-11-11 PROCEDURE — G8417 CALC BMI ABV UP PARAM F/U: HCPCS | Performed by: NURSE PRACTITIONER

## 2021-11-11 PROCEDURE — 3017F COLORECTAL CA SCREEN DOC REV: CPT | Performed by: NURSE PRACTITIONER

## 2021-11-11 PROCEDURE — 1111F DSCHRG MED/CURRENT MED MERGE: CPT | Performed by: NURSE PRACTITIONER

## 2021-11-11 PROCEDURE — 99214 OFFICE O/P EST MOD 30 MIN: CPT | Performed by: NURSE PRACTITIONER

## 2021-11-11 RX ORDER — OXYCODONE HYDROCHLORIDE 5 MG/1
5 TABLET ORAL EVERY 6 HOURS PRN
COMMUNITY
End: 2021-11-11 | Stop reason: SDUPTHER

## 2021-11-11 RX ORDER — OXYCODONE HYDROCHLORIDE 5 MG/1
5 TABLET ORAL 2 TIMES DAILY PRN
Qty: 60 TABLET | Refills: 0 | Status: SHIPPED | OUTPATIENT
Start: 2021-11-11 | End: 2022-04-05 | Stop reason: SDUPTHER

## 2021-11-11 RX ORDER — ONDANSETRON 4 MG/1
4 TABLET, ORALLY DISINTEGRATING ORAL EVERY 8 HOURS PRN
Qty: 90 TABLET | Refills: 0 | Status: SHIPPED | OUTPATIENT
Start: 2021-11-11 | End: 2021-12-23 | Stop reason: SDUPTHER

## 2021-11-11 NOTE — PROGRESS NOTES
Post-Discharge Transitional Care Management Services or Hospital Follow Up      Cayla Peter   YOB: 1960    Date of Office Visit:  11/11/2021  Date of Hospital Admission: 10/15/21  Date of Hospital Discharge: 10/15/21  Risk of hospital readmission (high >=14%. Medium >=10%) :Readmission Risk Score: 14      Care management risk score Rising risk (score 2-5) and Complex Care (Scores >=6): 0     Non face to face  following discharge, date last encounter closed (first attempt may have been earlier): *No documented post hospital discharge outreach found in the last 14 days    Call initiated 2 business days of discharge: *No response recorded in the last 14 days    Patient Active Problem List   Diagnosis    Positive FIT (fecal immunochemical test)    Cholecystitis    Gastroesophageal reflux disease without esophagitis    Alcohol use    Portal hypertension ([address]    Esophageal varices without bleeding (HCC)    Intrahepatic bile duct dilation    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[address]    Elevated bilirubin    Elevated CA 19-9 level    Alcohol abuse    Abdominal pain    Nausea and vomiting    Aspiration pneumonia due to inhalation of vomitus ([address]    Respiratory failure ([address]    Aspiration pneumonia ([address]       Allergies   Allergen Reactions    Asa [Aspirin]     Chocolate Swelling       Medications listed as ordered at the time of discharge from hospital  @DISCHARGEMEDSLIST(<NOROUTINE> error)@      Medications marked \"taking\" at this time  Outpatient Medications Marked as Taking for the 11/11/21 encounter (Office Visit) with Kevin Simmons Might, APRN - CNP   Medication Sig Dispense Refill    oxyCODONE (ROXICODONE) 5 MG immediate release tablet Take 1 tablet by mouth 2 times daily as needed for Pain for up to 30 days.  60 tablet 0    ondansetron (ZOFRAN ODT) 4 MG disintegrating tablet Take 1 tablet by mouth every 8 hours as needed for Nausea or Vomiting 90 tablet 0    levoFLOXacin (LEVAQUIN) 750 MG/150ML SOLN Infuse 150 mLs intravenously every 24 hours 1000 mL 0    omeprazole (PRILOSEC) 20 MG delayed release capsule Take 20 mg by mouth daily      Probiotic Product (PROBIOTIC-10 PO) Take by mouth daily      metoclopramide (REGLAN) 10 MG tablet Take 1 tablet by mouth 3 times daily 40 tablet 0    pantoprazole (PROTONIX) 40 MG tablet Take 1 tablet by mouth every morning (before breakfast) 90 tablet 3    famotidine (PEPCID) 40 MG tablet Take 1 tablet by mouth every evening 90 tablet 3        Medications patient taking as of now reconciled against medications ordered at time of hospital discharge: Yes    Chief Complaint   Patient presents with    Follow-Up from [facility]     discharged from [place] had surgery to remove mass        History of Present illness - Follow up of Hospital diagnosis(es):     Mr. 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 He has had surgery for a 4 cm well-differentiated adenocarcinoma at the hepatic duct confluence and common hepatic duct location. Surgery resulted in negative margins. One of six nodes was also positive resulting in the classification of a [id number], stage IIIC cholangiocarcinoma. He has had four courses of chemotherapy including Gemcitabine and I agree with your recommendation to continue with modified chemotherapy concomitant with radiation therapy. I explained the goal, process, and side effects of treatment to the patient who was quite anxious to proceed, and I have therefore proceeded with CT simulation today and we will notify him probably within a week about the initiation of treatment. Normal         Inpatient course: Discharge summary reviewed- see chart. Interval history/Current status:     States he is feeling well and is able to eat at this time. He has had a gastric tube placed for relief of nausea. He is using oxycodone as needed for pain and Zofran as needed for nausea.   He is requesting refills of these medications. He has chemotherapy planned after he gained some weight. A comprehensive review of systems was negative except for what was noted in the HPI. Vitals:    11/11/21 1138   BP: 112/72   Site: Left Upper Arm   Position: Sitting   Cuff Size: Medium Adult   Pulse: 100   Resp: 18   Temp: 98.1 °F (36.7 °C)   SpO2: 98%   Weight: 176 lb 3.2 oz (79.9 kg)   Height: 5' 9\" (1.753 m)     Body mass index is 26.02 kg/m². Wt Readings from Last 3 Encounters:   11/11/21 176 lb 3.2 oz (79.9 kg)   10/15/21 190 lb 3.2 oz (86.3 kg)   10/09/21 180 lb (81.6 kg)     BP Readings from Last 3 Encounters:   11/11/21 112/72   10/15/21 113/66   10/15/21 (!) 152/82        Physical Exam:  General Appearance: alert and oriented to person, place and time and appears older than stated age  Skin: warm and dry  Head: normocephalic and atraumatic  Eyes: conjunctivae normal and sclera anicteric  ENT: oropharynx clear and moist with normal mucous membranes  Neck: neck supple and non tender without mass   Pulmonary/Chest: clear to auscultation bilaterally- no wheezes, rales or rhonchi, normal air movement, no respiratory distress  Cardiovascular: normal rate and regular rhythm  Abdomen: bowel sounds normal and tenderness present- general,  without rebound and guarding  Extremities: no edema  Musculoskeletal: normal range of motion, no joint swelling, deformity or tenderness  Neurologic: gait and coordination normal and speech normal    Assessment/Plan:  1. 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[facility]    - oxyCODONE (ROXICODONE) 5 MG immediate release tablet; Take 1 tablet by mouth 2 times daily as needed for Pain for up to 30 days. Dispense: 60 tablet; Refill: 0  - ondansetron (ZOFRAN ODT) 4 MG disintegrating tablet; Take 1 tablet by mouth every 8 hours as needed for Nausea or Vomiting  Dispense: 90 tablet; Refill: 0  - NE DISCHARGE MEDS RECONCILED W/ CURRENT OUTPATIENT MED LIST    2.  Malignant neoplasm of stomach, unspecified location ([facility]    - oxyCODONE (ROXICODONE) 5 MG immediate release tablet; Take 1 tablet by mouth 2 times daily as needed for Pain for up to 30 days. Dispense: 60 tablet; Refill: 0  - ondansetron (ZOFRAN ODT) 4 MG disintegrating tablet; Take 1 tablet by mouth every 8 hours as needed for Nausea or Vomiting  Dispense: 90 tablet;  Refill: 0  - ID DISCHARGE MEDS RECONCILED W/ CURRENT OUTPATIENT MED LIST        Medical Decision Making: moderate complexity

## 2021-11-29 RX ORDER — OMEPRAZOLE 20 MG/1
20 CAPSULE, DELAYED RELEASE ORAL DAILY
Qty: 90 CAPSULE | Refills: 0 | Status: SHIPPED | OUTPATIENT
Start: 2021-11-29 | End: 2022-02-28 | Stop reason: SDUPTHER

## 2021-11-29 RX ORDER — METOCLOPRAMIDE 10 MG/1
10 TABLET ORAL 3 TIMES DAILY
Qty: 90 TABLET | Refills: 0 | Status: SHIPPED
Start: 2021-11-29 | End: 2022-04-05

## 2021-11-29 NOTE — TELEPHONE ENCOUNTER
Health Maintenance   Topic Date Due    COVID-19 Vaccine (1) 04/09/2022 (Originally 3/31/1972)    Flu vaccine (1) 09/24/2022 (Originally 9/1/2021)    Pneumococcal 0-64 years Vaccine (1 of 2 - PPSV23) 09/24/2022 (Originally [birth year]/31/1968)    HIV screen  09/24/2022 (Originally 3/31/1977)    Colon Cancer Screen FIT/FOBT  10/09/2022    DTaP/Tdap/Td vaccine (2 - Td or Tdap) 03/18/2024    Lipid screen  06/29/2025    Shingles Vaccine  Completed    Hepatitis C screen  Completed    Hepatitis A vaccine  Aged Out    Hepatitis B vaccine  Aged Out    Hib vaccine  Aged Out    Meningococcal (ACWY) vaccine  Aged Out             (applicable per patient's age: Cancer Screenings, Depression Screening, Fall Risk Screening, Immunizations)    LDL Cholesterol (mg/dL)   Date Value   06/29/2020 153 (H)     AST (U/L)   Date Value   10/15/2021 33     ALT (U/L)   Date Value   10/15/2021 24     BUN (mg/dL)   Date Value   10/15/2021 12      (goal A1C is < 7)   (goal LDL is <100) need 30-50% reduction from baseline     BP Readings from Last 3 Encounters:   11/11/21 112/72   10/15/21 113/66   10/15/21 (!) 152/82    (goal /80)      All Future Testing planned in [facility]:  Lab Frequency Next Occurrence   CBC Auto Differential Once 09/20/2021   ALT Once 09/24/2021   AST Once 75/04/8088   Basic Metabolic Panel Once 02/20/4817   Lipid Panel Once 09/20/2021   POCT Fecal Immunochemical Test (FIT) Once 10/15/2021       Next Visit Date:  Future Appointments   Date Time Provider Martell Bergman   3/25/2022  9:20 AM JUAN CARLOS Scales - CNP [facility]            Patient Active Problem List:     Positive FIT (fecal immunochemical test)     Cholecystitis     Gastroesophageal reflux disease without esophagitis     Alcohol use     Portal hypertension (HCC)     Esophageal varices without bleeding (HCC)     Intrahepatic bile duct dilation     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HCC)     Elevated bilirubin     Elevated CA 19-9 level     Alcohol abuse Abdominal pain     Nausea and vomiting     Aspiration pneumonia due to inhalation of vomitus (HCC)     Respiratory failure (HCC)     Aspiration pneumonia ([facility]

## 2021-12-22 ENCOUNTER — HOSPITAL ENCOUNTER (OUTPATIENT)
Dept: GENERAL RADIOLOGY | Age: 61
Discharge: HOME | End: 2021-12-24
Payer: COMMERCIAL

## 2021-12-22 VITALS
OXYGEN SATURATION: 99 % | DIASTOLIC BLOOD PRESSURE: 82 MMHG | SYSTOLIC BLOOD PRESSURE: 119 MMHG | RESPIRATION RATE: 12 BRPM | HEART RATE: 88 BPM | TEMPERATURE: 98.4 F

## 2021-12-22 DIAGNOSIS — C24.0 MALIGNANT NEOPLASM OF BILE DUCTS (HCC): ICD-10-CM

## 2021-12-22 PROCEDURE — 36598 INJ W/FLUOR EVAL CV DEVICE: CPT

## 2021-12-22 PROCEDURE — 6360000002 HC RX W HCPCS: Performed by: RADIOLOGY

## 2021-12-22 PROCEDURE — 76000 FLUOROSCOPY <1 HR PHYS/QHP: CPT

## 2021-12-22 PROCEDURE — 6360000004 HC RX CONTRAST MEDICATION: Performed by: RADIOLOGY

## 2021-12-22 RX ORDER — HEPARIN SODIUM (PORCINE) LOCK FLUSH IV SOLN 100 UNIT/ML 100 UNIT/ML
500 SOLUTION INTRAVENOUS PRN
Status: DISCONTINUED | OUTPATIENT
Start: 2021-12-22 | End: 2021-12-25 | Stop reason: HOSPADM

## 2021-12-22 RX ADMIN — IOPAMIDOL 20 ML: 755 INJECTION, SOLUTION INTRAVENOUS at 13:25

## 2021-12-22 RX ADMIN — HEPARIN 500 UNITS: 100 SYRINGE at 13:24

## 2021-12-22 NOTE — PLAN OF CARE
RN called the [address] [place] [address] and informed Dr. Genaro De Guzman staff that the port study was completed and it is working.

## 2021-12-23 ENCOUNTER — TELEPHONE (OUTPATIENT)
Dept: PRIMARY CARE CLINIC | Age: 61
End: 2021-12-23

## 2021-12-23 DIAGNOSIS — C24.0 KLATSKIN'S TUMOR (HCC): ICD-10-CM

## 2021-12-23 DIAGNOSIS — C16.9 MALIGNANT NEOPLASM OF STOMACH, UNSPECIFIED LOCATION (HCC): ICD-10-CM

## 2021-12-23 RX ORDER — ONDANSETRON 4 MG/1
4 TABLET, ORALLY DISINTEGRATING ORAL EVERY 8 HOURS PRN
Qty: 90 TABLET | Refills: 0 | Status: SHIPPED | OUTPATIENT
Start: 2021-12-23 | End: 2022-02-17 | Stop reason: SDUPTHER

## 2021-12-23 NOTE — TELEPHONE ENCOUNTER
Health Maintenance   Topic Date Due    COVID-19 Vaccine (1) 04/09/2022 (Originally 3/31/1965)    Flu vaccine (1) 09/24/2022 (Originally 9/1/2021)    Pneumococcal 0-64 years Vaccine (1 of 2 - PPSV23) 09/24/2022 (Originally [birth year]/31/1968)    HIV screen  09/24/2022 (Originally 3/31/1977)    Colon Cancer Screen FIT/FOBT  10/09/2022    DTaP/Tdap/Td vaccine (2 - Td or Tdap) 03/18/2024    Lipid screen  06/29/2025    Shingles Vaccine  Completed    Hepatitis C screen  Completed    Hepatitis A vaccine  Aged Out    Hepatitis B vaccine  Aged Out    Hib vaccine  Aged Out    Meningococcal (ACWY) vaccine  Aged Out             (applicable per patient's age: Cancer Screenings, Depression Screening, Fall Risk Screening, Immunizations)    LDL Cholesterol (mg/dL)   Date Value   06/29/2020 153 (H)     AST (U/L)   Date Value   10/15/2021 33     ALT (U/L)   Date Value   10/15/2021 24     BUN (mg/dL)   Date Value   10/15/2021 12      (goal A1C is < 7)   (goal LDL is <100) need 30-50% reduction from baseline     BP Readings from Last 3 Encounters:   12/22/21 119/82   11/11/21 112/72   10/15/21 113/66    (goal /80)      All Future Testing planned in [facility]:  Lab Frequency Next Occurrence   CBC Auto Differential Once 09/20/2021   ALT Once 09/24/2021   AST Once 88/09/6017   Basic Metabolic Panel Once 17/26/0502   Lipid Panel Once 09/20/2021   POCT Fecal Immunochemical Test (FIT) Once 10/15/2021       Next Visit Date:  Future Appointments   Date Time Provider Martell Bergman   3/25/2022  9:20 AM Karyna Cason APRN - CNP [facility]            Patient Active Problem List:     Positive FIT (fecal immunochemical test)     Cholecystitis     Gastroesophageal reflux disease without esophagitis     Alcohol use     Portal hypertension (HCC)     Esophageal varices without bleeding (HCC)     Intrahepatic bile duct dilation     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HCC)     Elevated bilirubin     Elevated CA 19-9 level     Alcohol abuse Abdominal pain     Nausea and vomiting     Aspiration pneumonia due to inhalation of vomitus (HCC)     Respiratory failure (HCC)     Asp[facility] pneumonia ([facility]

## 2021-12-23 NOTE — TELEPHONE ENCOUNTER
----- Message from Cammy Armstrong sent at 12/23/2021 12:46 PM EST -----  Subject: Refill Request    QUESTIONS  Name of Medication? ondansetron (ZOFRAN ODT) 4 MG disintegrating tablet  Patient-reported dosage and instructions? as needed   How many days do you have left? 0  Preferred Pharmacy? [address] [facility] phone number (if available)? [phone]  ---------------------------------------------------------------------------  --------------  CALL BACK INFO  What is the best way for the office to contact you?  OK to leave message on   voicemail  Preferred Call Back Phone Number? [phone]

## 2022-02-07 ENCOUNTER — TELEPHONE (OUTPATIENT)
Dept: PRIMARY CARE CLINIC | Age: 62
End: 2022-02-07

## 2022-02-17 DIAGNOSIS — C16.9 MALIGNANT NEOPLASM OF STOMACH, UNSPECIFIED LOCATION (HCC): ICD-10-CM

## 2022-02-17 DIAGNOSIS — C24.0 KLATSKIN'S TUMOR (HCC): ICD-10-CM

## 2022-02-17 PROBLEM — C22.1 CHOLANGIOCARCINOMA (HCC): Status: ACTIVE | Noted: 2021-11-16

## 2022-02-17 RX ORDER — NICOTINE 21 MG/24HR
PATCH, TRANSDERMAL 24 HOURS TRANSDERMAL
COMMUNITY
Start: 2021-11-16

## 2022-02-17 RX ORDER — ONDANSETRON 4 MG/1
4 TABLET, ORALLY DISINTEGRATING ORAL EVERY 8 HOURS PRN
Qty: 90 TABLET | Refills: 0 | Status: SHIPPED | OUTPATIENT
Start: 2022-02-17 | End: 2022-04-05 | Stop reason: SDUPTHER

## 2022-02-17 RX ORDER — LIDOCAINE AND PRILOCAINE 25; 25 MG/G; MG/G
CREAM TOPICAL
COMMUNITY
Start: 2022-01-11

## 2022-02-17 RX ORDER — OXYCODONE HYDROCHLORIDE 5 MG/1
5 TABLET ORAL 2 TIMES DAILY PRN
Qty: 60 TABLET | Refills: 0 | OUTPATIENT
Start: 2022-02-17 | End: 2022-03-19

## 2022-02-17 NOTE — TELEPHONE ENCOUNTER
The oxycodone prescription should be coming from oncology for pain control. Please have him check with his oncologist. Thank you.

## 2022-02-17 NOTE — TELEPHONE ENCOUNTER
Called patient and informed him that Burma Settler refilled his Zofran but his oxycodone should be coming from oncology for pain control. He should contact oncology. Verbalizes understanding.

## 2022-02-17 NOTE — TELEPHONE ENCOUNTER
Phone call from patient requesting a refill of Oxycodone 5 mg and Zofran 4 mg.  is going thru chemo and takes oxycodone for abdominal pain associated to chemo. Patient  is willing to come into office if needed.     Health Maintenance   Topic Date Due    COVID-19 Vaccine (1) 04/09/2022 (Originally 3/31/1965)    Flu vaccine (1) 09/24/2022 (Originally 9/1/2021)    Pneumococcal 0-64 years Vaccine (1 of 2 - PPSV23) 09/24/2022 (Originally [birth year]/31/1968)    HIV screen  09/24/2022 (Originally 3/31/1977)    Depression Screen  03/24/2022    Colorectal Cancer Screen  10/09/2022    DTaP/Tdap/Td vaccine (2 - Td or Tdap) 03/18/2024    Lipid screen  06/29/2025    Shingles Vaccine  Completed    Hepatitis C screen  Completed    Hepatitis A vaccine  Aged Out    Hepatitis B vaccine  Aged Out    Hib vaccine  Aged Out    Meningococcal (ACWY) vaccine  Aged Out             (applicable per patient's age: Cancer Screenings, Depression Screening, Fall Risk Screening, Immunizations)    LDL Cholesterol (mg/dL)   Date Value   06/29/2020 153 (H)     AST (U/L)   Date Value   10/15/2021 33     ALT (U/L)   Date Value   10/15/2021 24     BUN (mg/dL)   Date Value   10/15/2021 12      (goal A1C is < 7)   (goal LDL is <100) need 30-50% reduction from baseline     BP Readings from Last 3 Encounters:   12/22/21 119/82   11/11/21 112/72   10/15/21 113/66    (goal /80)      All Future Testing planned in [facility]:  Lab Frequency Next Occurrence   POCT Fecal Immunochemical Test (FIT) Once 02/21/2022       Next Visit Date:  Future Appointments   Date Time Provider Martell Bergman   3/25/2022  9:20 AM JUAN CARLOS Reeder - CNP [facility]            Patient Active Problem List:     Positive FIT (fecal immunochemical test)     Cholecystitis     Gastroesophageal reflux disease without esophagitis     Alcohol use     Portal hypertension (HCC)     Esophageal varices without bleeding (HCC)     Intrahepatic bile duct dilation 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[facility]     Elevated bilirubin     Elevated CA 19-9 level     Alcohol abuse     Abdominal pain     Nausea and vomiting     Aspiration pneumonia due to inhalation of vomitus (HCC)     Respiratory failure (HCC)     Aspiration pneumonia ([facility]

## 2022-02-22 ENCOUNTER — TELEPHONE (OUTPATIENT)
Dept: PRIMARY CARE CLINIC | Age: 62
End: 2022-02-22

## 2022-02-22 NOTE — TELEPHONE ENCOUNTER
Called and spoke with patient and reminded him to return OC FIT test to the office. Verbalizes understanding.

## 2022-02-28 DIAGNOSIS — K21.9 GASTROESOPHAGEAL REFLUX DISEASE WITHOUT ESOPHAGITIS: Primary | ICD-10-CM

## 2022-02-28 RX ORDER — OMEPRAZOLE 20 MG/1
20 CAPSULE, DELAYED RELEASE ORAL DAILY
Qty: 90 CAPSULE | Refills: 0 | Status: ON HOLD
Start: 2022-02-28 | End: 2022-05-16 | Stop reason: HOSPADM

## 2022-02-28 NOTE — TELEPHONE ENCOUNTER
Health Maintenance   Topic Date Due    COVID-19 Vaccine (1) 04/09/2022 (Originally 3/31/1965)    Flu vaccine (1) 09/24/2022 (Originally 9/1/2021)    Pneumococcal 0-64 years Vaccine (1 of 2 - PPSV23) 09/24/2022 (Originally [birth year]/31/1968)    HIV screen  09/24/2022 (Originally 3/31/1977)    Depression Screen  03/24/2022    Colorectal Cancer Screen  10/09/2022    DTaP/Tdap/Td vaccine (2 - Td or Tdap) 03/18/2024    Lipid screen  06/29/2025    Shingles Vaccine  Completed    Hepatitis C screen  Completed    Hepatitis A vaccine  Aged Out    Hepatitis B vaccine  Aged Out    Hib vaccine  Aged Out    Meningococcal (ACWY) vaccine  Aged Out             (applicable per patient's age: Cancer Screenings, Depression Screening, Fall Risk Screening, Immunizations)    LDL Cholesterol (mg/dL)   Date Value   06/29/2020 153 (H)     AST (U/L)   Date Value   10/15/2021 33     ALT (U/L)   Date Value   10/15/2021 24     BUN (mg/dL)   Date Value   10/15/2021 12      (goal A1C is < 7)   (goal LDL is <100) need 30-50% reduction from baseline     BP Readings from Last 3 Encounters:   12/22/21 119/82   11/11/21 112/72   10/15/21 113/66    (goal /80)      All Future Testing planned in [facility]:  Lab Frequency Next Occurrence   POCT Fecal Immunochemical Test (FIT) Once 03/08/2022       Next Visit Date:  Future Appointments   Date Time Provider Martell Bergman   3/25/2022  9:20 AM Manpreet Sender JUAN CARLOS Cason CNP [facility]            Patient Active Problem List:     Positive FIT (fecal immunochemical test)     Cholecystitis     Gastroesophageal reflux disease without esophagitis     Alcohol use     Portal hypertension (HCC)     Esophageal varices without bleeding (HCC)     Intrahepatic bile duct dilation     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HCC)     Elevated bilirubin     Elevated CA 19-9 level     Alcohol abuse     Abdominal pain     Nausea and vomiting     Aspiration pneumonia due to inhalation of vomitus ([address]     Respiratory failure ([address] Aspiration pneumonia ([facility]     Cholangiocarcinoma ([facility]

## 2022-03-09 ENCOUNTER — TELEPHONE (OUTPATIENT)
Dept: PRIMARY CARE CLINIC | Age: 62
End: 2022-03-09

## 2022-03-09 NOTE — TELEPHONE ENCOUNTER
Called patient to remind him to return OC FIT test to the office, spoke with wife who states that patient is in [place] for chemo treatment- but he has lost the kit. Informed that a new kit could be picked up in the office. Verbalizes understanding.

## 2022-03-24 ENCOUNTER — TELEPHONE (OUTPATIENT)
Dept: PRIMARY CARE CLINIC | Age: 62
End: 2022-03-24

## 2022-03-24 NOTE — TELEPHONE ENCOUNTER
Contacted patients wife in regards to open FIT card order. Patient going to come to office and  a new kit since he has misplaced the other one.

## 2022-04-05 ENCOUNTER — OFFICE VISIT (OUTPATIENT)
Dept: PRIMARY CARE CLINIC | Age: 62
End: 2022-04-05
Payer: COMMERCIAL

## 2022-04-05 VITALS
DIASTOLIC BLOOD PRESSURE: 74 MMHG | HEIGHT: 69 IN | RESPIRATION RATE: 20 BRPM | WEIGHT: 169.6 LBS | HEART RATE: 84 BPM | SYSTOLIC BLOOD PRESSURE: 124 MMHG | BODY MASS INDEX: 25.12 KG/M2 | TEMPERATURE: 97.7 F | OXYGEN SATURATION: 100 %

## 2022-04-05 DIAGNOSIS — K21.9 GASTROESOPHAGEAL REFLUX DISEASE WITHOUT ESOPHAGITIS: Primary | ICD-10-CM

## 2022-04-05 DIAGNOSIS — C24.0 KLATSKIN'S TUMOR (HCC): ICD-10-CM

## 2022-04-05 DIAGNOSIS — Z13.220 LIPID SCREENING: ICD-10-CM

## 2022-04-05 DIAGNOSIS — C22.1 CHOLANGIOCARCINOMA (HCC): ICD-10-CM

## 2022-04-05 DIAGNOSIS — E44.1 MILD MALNUTRITION (HCC): ICD-10-CM

## 2022-04-05 PROCEDURE — G8427 DOCREV CUR MEDS BY ELIG CLIN: HCPCS | Performed by: NURSE PRACTITIONER

## 2022-04-05 PROCEDURE — G8417 CALC BMI ABV UP PARAM F/U: HCPCS | Performed by: NURSE PRACTITIONER

## 2022-04-05 PROCEDURE — 3017F COLORECTAL CA SCREEN DOC REV: CPT | Performed by: NURSE PRACTITIONER

## 2022-04-05 PROCEDURE — 4004F PT TOBACCO SCREEN RCVD TLK: CPT | Performed by: NURSE PRACTITIONER

## 2022-04-05 PROCEDURE — 99214 OFFICE O/P EST MOD 30 MIN: CPT | Performed by: NURSE PRACTITIONER

## 2022-04-05 RX ORDER — ONDANSETRON 4 MG/1
4 TABLET, ORALLY DISINTEGRATING ORAL EVERY 8 HOURS PRN
Qty: 270 TABLET | Refills: 1 | Status: ON HOLD | OUTPATIENT
Start: 2022-04-05 | End: 2022-05-19 | Stop reason: SDUPTHER

## 2022-04-05 RX ORDER — OXYCODONE HYDROCHLORIDE 5 MG/1
5 TABLET ORAL 2 TIMES DAILY PRN
Qty: 14 TABLET | Refills: 0 | Status: SHIPPED | OUTPATIENT
Start: 2022-04-05 | End: 2022-04-12

## 2022-04-05 RX ORDER — LACTOSE-REDUCED FOOD
1 LIQUID (ML) ORAL DAILY
Qty: 30 EACH | Refills: 2 | Status: SHIPPED | OUTPATIENT
Start: 2022-04-05

## 2022-04-05 ASSESSMENT — PATIENT HEALTH QUESTIONNAIRE - PHQ9
SUM OF ALL RESPONSES TO PHQ QUESTIONS 1-9: 0
SUM OF ALL RESPONSES TO PHQ QUESTIONS 1-9: 0
2. FEELING DOWN, DEPRESSED OR HOPELESS: 0
SUM OF ALL RESPONSES TO PHQ QUESTIONS 1-9: 0
SUM OF ALL RESPONSES TO PHQ9 QUESTIONS 1 & 2: 0
1. LITTLE INTEREST OR PLEASURE IN DOING THINGS: 0
SUM OF ALL RESPONSES TO PHQ QUESTIONS 1-9: 0

## 2022-04-05 ASSESSMENT — ENCOUNTER SYMPTOMS
HOARSE VOICE: 0
BELCHING: 0
DIARRHEA: 0
NAUSEA: 0
RHINORRHEA: 0
COUGH: 0
WHEEZING: 0
STRIDOR: 0
CONSTIPATION: 0
CHOKING: 0
ABDOMINAL PAIN: 1
SORE THROAT: 0
VOMITING: 0
WATER BRASH: 0
SHORTNESS OF BREATH: 0
HEARTBURN: 1
GLOBUS SENSATION: 0

## 2022-04-05 NOTE — PATIENT INSTRUCTIONS
SURVEY:     You may be receiving a survey from [email or web address] regarding your visit today. Please complete the survey to enable us to provide the highest quality of care to you and your family. If you cannot score us a very good on any question, please call the office to discuss how we could have made your experience a very good one. Thank you.   Evert Severe Might, APRN-DEANNA Silver, CNP  Ronak Carter, LPN  Santana Humphries, LPANA Jerez, CMA  Jackie Atwood, MARYLU Villavicencio, CMA  Sridevi, PCA

## 2022-04-05 NOTE — PROGRESS NOTES
Name: Livia Leong  : 1960         Chief Complaint:     Chief Complaint   Patient presents with    Gastroesophageal Reflux     routine check, abdominal pain nothing new    Other     Wants to discuss Pallative care       History of Present Illness:      Livia Leong is a 63 y.o.  male who presents with Gastroesophageal Reflux (routine check, abdominal pain nothing new) and Other (Wants to discuss Pallative care)      [facility] is here for a routine office visit. Gallbladder CA-patient unfortunately is undergoing treatments for recurrence of disease. He states he is feeling pretty well but has occasional nausea and chronic abdominal pain. He is requesting options for pain management/palliative care. Mild malnutritionpatient states he is using boost over-the-counter as directed but this is becoming cost prohibitive. He is looking for options to help with cost.    Gastroesophageal Reflux  He complains of abdominal pain (chronic) and heartburn. He reports no belching, no chest pain, no choking, no coughing, no dysphagia, no early satiety, no globus sensation, no hoarse voice, no nausea, no sore throat, no stridor, no tooth decay, no water brash or no wheezing. This is a chronic problem. The current episode started more than 1 year ago. The problem occurs occasionally. The problem has been unchanged. The heartburn duration is less than a minute. The heartburn is located in the substernum. The heartburn is of moderate intensity. The heartburn does not wake him from sleep. The heartburn does not limit his activity. The heartburn doesn't change with position. The symptoms are aggravated by certain foods, smoking and stress. Associated symptoms include fatigue (mild). Pertinent negatives include no anemia, melena, muscle weakness or weight loss. Risk factors include lack of exercise, smoking/tobacco exposure and caffeine use. He has tried a PPI for the symptoms.  The treatment provided significant relief. Past procedures do not include an EGD or a UGI. Past invasive treatments do not include gastroplasty, gastroplication or reflux surgery. Past Medical History:     Past Medical History:   Diagnosis Date    Anxiety     Aspiration pneumonia due to inhalation of vomitus ([facility] [address] 10/15/2021    Chronic back pain     Depression     Gastroesophageal reflux disease without esophagitis 8/6/2020    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[facility] [address] 8/7/2020      Reviewed all health maintenance requirements and ordered appropriate tests  Health Maintenance Due   Topic Date Due    Colorectal Cancer Screen  06/29/2021    Depression Screen  03/24/2022       Past Surgical History:     Past Surgical History:   Procedure Laterality Date    ERCP  08/07/2020    stent insertion, cholangiopancreatography  with brushings.  ERCP  8/7/2020    ERCP STENT INSERTION performed by Sheryl Saab MD at [address] [place] [address] Left 09/18/2020    left subclavian port insertion    PORT SURGERY Left 9/18/2020    PORT INSERTION, SUBCLAVIAN, W/C-ARM performed by Kaye Carlson DO at [facility] N/A 10/15/2021    EGD BIOPSY performed by Jojo Zepeda MD at [facility] OR        Medications:       Prior to Admission medications    Medication Sig Start Date End Date Taking? Authorizing Provider   ondansetron (ZOFRAN ODT) 4 MG disintegrating tablet Take 1 tablet by mouth every 8 hours as needed for Nausea or Vomiting 4/5/22  Yes Amparo Caba Might, APRN - CNP   Nutritional Supplements (BOOST) LIQD Take 1 Can by mouth daily 4/5/22  Yes Amparo Caba Might, APRN - DEANNA   oxyCODONE (ROXICODONE) 5 MG immediate release tablet Take 1 tablet by mouth 2 times daily as needed for Pain for up to 7 days.  4/5/22 4/12/22 Yes Amparo Caba Might, APRN - CNP   omeprazole (PRILOSEC) 20 MG delayed release capsule Take 1 capsule by mouth daily 2/28/22  Yes Amparo Caba Might, APRN - DEANNA   lidocaine-prilocaine (EMLA) 2.5-2.5 % cream  1/11/22 Yes Historical Provider, MD   nicotine (NICODERM CQ) 14 MG/24HR  11/16/21  Yes Historical Provider, MD   Probiotic Product (PROBIOTIC-10 PO) Take by mouth daily   Yes Historical Provider, MD   famotidine (PEPCID) 40 MG tablet Take 1 tablet by mouth every evening 9/24/21  Yes Seun Cason, APRN - CNP   Multiple Vitamins-Minerals (THERAPEUTIC MULTIVITAMIN-MINERALS) tablet Take 1 tablet by mouth daily   Yes Historical Provider, MD        Allergies:       Asa [aspirin] and Chocolate    Social History:     Tobacco:    reports that he has been smoking cigarettes. He has a 5.00 pack-year smoking history. He has never used smokeless tobacco.  Alcohol:      reports current alcohol use of about 5.8 standard drinks of alcohol per week. Drug Use:  reports no history of drug use. Family History:     Family History   Problem Relation Age of Onset    Hypertension Mother     Stroke Mother     High Blood Pressure Mother     Diabetes Father     Hypertension Father     High Blood Pressure Father     Heart Disease Father        Review of Systems:     Positive and Negative as described in HPI    Review of Systems   Constitutional: Positive for fatigue (mild). Negative for chills, fever and weight loss. HENT: Negative for congestion, hoarse voice, rhinorrhea and sore throat. Eyes: Negative for visual disturbance. Respiratory: Negative for cough, choking, shortness of breath and wheezing. Cardiovascular: Negative for chest pain and palpitations. Gastrointestinal: Positive for abdominal pain (chronic) and heartburn. Negative for constipation, diarrhea, dysphagia, melena, nausea and vomiting. Genitourinary: Negative for difficulty urinating and dysuria. Musculoskeletal: Negative for gait problem, muscle weakness, neck pain and neck stiffness. Skin: Negative for rash. Neurological: Negative for dizziness, syncope, light-headedness and headaches.        Physical Exam:   Vitals:  /74 (Position: Sitting) Pulse 84   Temp 97.7 °F (36.5 °C) (Temporal)   Resp 20   Ht 5' 9\" (1.753 m)   Wt 169 lb 9.6 oz (76.9 kg)   SpO2 100%   BMI 25.05 kg/m²     Physical Exam  Vitals and nursing note reviewed. Constitutional:       General: He is not in acute distress. Appearance: Normal appearance. He is underweight. He is not ill-appearing. HENT:      Mouth/Throat:      Mouth: Mucous membranes are moist.   Eyes:      General: No scleral icterus. Conjunctiva/sclera: Conjunctivae normal.   Cardiovascular:      Rate and Rhythm: Normal rate and regular rhythm. Heart sounds: No murmur heard. Pulmonary:      Effort: Pulmonary effort is normal.      Breath sounds: Normal breath sounds. No wheezing. Abdominal:      General: Bowel sounds are normal. There is no distension. Palpations: Abdomen is soft. Tenderness: There is abdominal tenderness (mild diffuse). Musculoskeletal:      Cervical back: Normal range of motion and neck supple. Right lower leg: No edema. Left lower leg: No edema. Lymphadenopathy:      Cervical: No cervical adenopathy. Skin:     General: Skin is warm and dry. Neurological:      Mental Status: He is alert and oriented to person, place, and time.    Psychiatric:         Mood and Affect: Mood normal.         Behavior: Behavior normal.         Data:     Lab Results   Component Value Date     10/15/2021    K 3.8 10/15/2021     10/15/2021    CO2 26 10/15/2021    BUN 12 10/15/2021    CREATININE 0.62 10/15/2021    GLUCOSE 59 10/15/2021    PROT 6.1 10/15/2021    LABALBU 3.4 10/15/2021    BILITOT 0.3 10/15/2021    ALKPHOS 93 10/15/2021    AST 33 10/15/2021    ALT 24 10/15/2021     Lab Results   Component Value Date    WBC 2.1 10/15/2021    WBC 13.3 10/10/2021    RBC 4.01 10/15/2021    HGB 13.6 10/15/2021    HCT 39.2 10/15/2021    MCV 98 10/15/2021    MCH 33.9 10/15/2021    MCHC 34.7 10/15/2021    RDW 11.9 10/10/2021     10/15/2021     10/10/2021 MPV 8.7 10/15/2021     Lab Results   Component Value Date    TSH 1.51 06/29/2020     Lab Results   Component Value Date    CHOL 267 06/29/2020    HDL 99 06/29/2020       Assessment/Plan:      Diagnosis Orders   1. Gastroesophageal reflux disease without esophagitis  CBC with Auto Differential    ALT    AST    Basic Metabolic Panel   2. Cholangiocarcinoma [facility])  External Referral To Palliative Care   3. 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[facility] [address]  ondansetron (ZOFRAN ODT) 4 MG disintegrating tablet    oxyCODONE (ROXICODONE) 5 MG immediate release tablet   4. Mild malnutrition (HCC)  Nutritional Supplements (BOOST) LIQD   5. Lipid screening  Lipid Panel     We will continue all current medications. A refill of Zofran given as needed. We have referred him for pain management. We will provide temporary supply of oxycodone until he can become established. Rx for boost given. References for financial aid for cancer treatment given. We will see him back in 6 months with repeat labs, sooner if any issues. 1.  [facility] received counseling on the following healthy behaviors: nutrition, exercise and medication adherence  2. Patient given educational materials - see patient instructions  3. Was a self-tracking handout given in paper form or via [employer]? No  If yes, see orders or list here. 4.  Discussed use, benefit, and side effects of prescribed medications. Barriers to medication compliance addressed. All patient questions answered. Pt voiced understanding. 5.  Reviewed prior labs and health maintenance  6. Continue current medications, diet and exercise.     Completed Refills   Requested Prescriptions     Signed Prescriptions Disp Refills    ondansetron (ZOFRAN ODT) 4 MG disintegrating tablet 270 tablet 1     Sig: Take 1 tablet by mouth every 8 hours as needed for Nausea or Vomiting    Nutritional Supplements (BOOST) LIQD 30 each 2     Sig: Take 1 Can by mouth daily    oxyCODONE (ROXICODONE) 5 MG immediate release tablet 14 tablet 0     Sig: Take 1 tablet by mouth 2 times daily as needed for Pain for up to 7 days. Return in about 6 months (around 10/5/2022).

## 2022-04-08 ENCOUNTER — TELEPHONE (OUTPATIENT)
Dept: PRIMARY CARE CLINIC | Age: 62
End: 2022-04-08

## 2022-04-08 NOTE — TELEPHONE ENCOUNTER
Contacted patient in regards to open FIT card order. Informed patient to complete and bring back to office ASAP. Patient verbalized understanding.

## 2022-05-04 ENCOUNTER — HOSPITAL ENCOUNTER (EMERGENCY)
Age: 62
Discharge: HOME | End: 2022-05-05
Attending: EMERGENCY MEDICINE
Payer: COMMERCIAL

## 2022-05-04 VITALS
BODY MASS INDEX: 21.86 KG/M2 | RESPIRATION RATE: 22 BRPM | DIASTOLIC BLOOD PRESSURE: 95 MMHG | WEIGHT: 148 LBS | SYSTOLIC BLOOD PRESSURE: 118 MMHG | OXYGEN SATURATION: 95 % | HEART RATE: 113 BPM

## 2022-05-04 DIAGNOSIS — R11.2 NON-INTRACTABLE VOMITING WITH NAUSEA, UNSPECIFIED VOMITING TYPE: Primary | ICD-10-CM

## 2022-05-04 PROCEDURE — 80076 HEPATIC FUNCTION PANEL: CPT

## 2022-05-04 PROCEDURE — 96375 TX/PRO/DX INJ NEW DRUG ADDON: CPT

## 2022-05-04 PROCEDURE — 85025 COMPLETE CBC W/AUTO DIFF WBC: CPT

## 2022-05-04 PROCEDURE — 96374 THER/PROPH/DIAG INJ IV PUSH: CPT

## 2022-05-04 PROCEDURE — 83690 ASSAY OF LIPASE: CPT

## 2022-05-04 PROCEDURE — 99284 EMERGENCY DEPT VISIT MOD MDM: CPT

## 2022-05-04 ASSESSMENT — PAIN - FUNCTIONAL ASSESSMENT: PAIN_FUNCTIONAL_ASSESSMENT: 0-10

## 2022-05-04 ASSESSMENT — PAIN SCALES - GENERAL: PAINLEVEL_OUTOF10: 10

## 2022-05-04 ASSESSMENT — PAIN DESCRIPTION - ORIENTATION: ORIENTATION: MID;LEFT;RIGHT

## 2022-05-04 ASSESSMENT — PAIN DESCRIPTION - LOCATION: LOCATION: ABDOMEN

## 2022-05-05 ENCOUNTER — TELEPHONE (OUTPATIENT)
Dept: PRIMARY CARE CLINIC | Age: 62
End: 2022-05-05

## 2022-05-05 LAB
ABSOLUTE EOS #: <0.03 K/UL (ref 0–0.44)
ABSOLUTE IMMATURE GRANULOCYTE: <0.03 K/UL (ref 0–0.3)
ABSOLUTE LYMPH #: 1.65 K/UL (ref 1.1–3.7)
ABSOLUTE MONO #: 0.81 K/UL (ref 0.1–1.2)
ALBUMIN SERPL-MCNC: 3.8 G/DL (ref 3.5–5.2)
ALBUMIN/GLOBULIN RATIO: 1.1 (ref 1–2.5)
ALP BLD-CCNC: 257 U/L (ref 40–129)
ALT SERPL-CCNC: 20 U/L (ref 5–41)
AST SERPL-CCNC: 22 U/L
BASOPHILS # BLD: 0 % (ref 0–2)
BASOPHILS ABSOLUTE: <0.03 K/UL (ref 0–0.2)
BILIRUB SERPL-MCNC: 0.96 MG/DL (ref 0.3–1.2)
BILIRUBIN DIRECT: 0.37 MG/DL
BILIRUBIN, INDIRECT: 0.59 MG/DL (ref 0–1)
EOSINOPHILS RELATIVE PERCENT: 0 % (ref 1–4)
HCT VFR BLD CALC: 44.6 % (ref 40.7–50.3)
HEMOGLOBIN: 15.5 G/DL (ref 13–17)
IMMATURE GRANULOCYTES: 0 %
LIPASE: 13 U/L (ref 13–60)
LYMPHOCYTES # BLD: 18 % (ref 24–43)
MCH RBC QN AUTO: 35.1 PG (ref 25.2–33.5)
MCHC RBC AUTO-ENTMCNC: 34.8 G/DL (ref 28.4–34.8)
MCV RBC AUTO: 100.9 FL (ref 82.6–102.9)
MONOCYTES # BLD: 9 % (ref 3–12)
NRBC AUTOMATED: 0 PER 100 WBC
PDW BLD-RTO: 13.1 % (ref 11.8–14.4)
PLATELET # BLD: 298 K/UL (ref 138–453)
PMV BLD AUTO: 9.2 FL (ref 8.1–13.5)
RBC # BLD: 4.42 M/UL (ref 4.21–5.77)
SEG NEUTROPHILS: 73 % (ref 36–65)
SEGMENTED NEUTROPHILS ABSOLUTE COUNT: 6.66 K/UL (ref 1.5–8.1)
TOTAL PROTEIN: 7.4 G/DL (ref 6.4–8.3)
WBC # BLD: 9.2 K/UL (ref 3.5–11.3)

## 2022-05-05 PROCEDURE — A4216 STERILE WATER/SALINE, 10 ML: HCPCS | Performed by: EMERGENCY MEDICINE

## 2022-05-05 PROCEDURE — 6360000002 HC RX W HCPCS: Performed by: EMERGENCY MEDICINE

## 2022-05-05 PROCEDURE — 6370000000 HC RX 637 (ALT 250 FOR IP): Performed by: EMERGENCY MEDICINE

## 2022-05-05 PROCEDURE — 2580000003 HC RX 258: Performed by: EMERGENCY MEDICINE

## 2022-05-05 PROCEDURE — 2500000003 HC RX 250 WO HCPCS: Performed by: EMERGENCY MEDICINE

## 2022-05-05 RX ORDER — DIPHENHYDRAMINE HYDROCHLORIDE 50 MG/ML
25 INJECTION INTRAMUSCULAR; INTRAVENOUS ONCE
Status: COMPLETED | OUTPATIENT
Start: 2022-05-05 | End: 2022-05-05

## 2022-05-05 RX ORDER — 0.9 % SODIUM CHLORIDE 0.9 %
1000 INTRAVENOUS SOLUTION INTRAVENOUS ONCE
Status: COMPLETED | OUTPATIENT
Start: 2022-05-05 | End: 2022-05-05

## 2022-05-05 RX ORDER — METOCLOPRAMIDE HYDROCHLORIDE 5 MG/ML
10 INJECTION INTRAMUSCULAR; INTRAVENOUS ONCE
Status: COMPLETED | OUTPATIENT
Start: 2022-05-05 | End: 2022-05-05

## 2022-05-05 RX ORDER — METOCLOPRAMIDE 10 MG/1
10 TABLET ORAL 4 TIMES DAILY PRN
Qty: 15 TABLET | Refills: 3 | Status: ON HOLD | OUTPATIENT
Start: 2022-05-05 | End: 2022-05-19 | Stop reason: SDUPTHER

## 2022-05-05 RX ADMIN — LIDOCAINE HYDROCHLORIDE: 20 SOLUTION ORAL; TOPICAL at 00:21

## 2022-05-05 RX ADMIN — FAMOTIDINE 20 MG: 10 INJECTION INTRAVENOUS at 00:18

## 2022-05-05 RX ADMIN — DIPHENHYDRAMINE HYDROCHLORIDE 25 MG: 50 INJECTION, SOLUTION INTRAMUSCULAR; INTRAVENOUS at 00:17

## 2022-05-05 RX ADMIN — SODIUM CHLORIDE 1000 ML: 9 INJECTION, SOLUTION INTRAVENOUS at 00:24

## 2022-05-05 RX ADMIN — METOCLOPRAMIDE 10 MG: 5 INJECTION, SOLUTION INTRAMUSCULAR; INTRAVENOUS at 00:16

## 2022-05-05 NOTE — TELEPHONE ENCOUNTER
[facility] [facility] Trans[facility] Initial Follow Up Call    Outreach made within 2 business days of discharge: Yes    Patient: Jeffery Granda Patient : 1960   MRN: [id number]  Reason for Admission: There are no discharge diagnoses documented for the most recent discharge. Discharge Date: 22       Spoke with: Miles Christianson     Discharge department/facility: [facility]     TCM Interactive Patient Contact:  Was patient able to fill all prescriptions: Yes  Was patient instructed to bring all medications to the follow-up visit: Yes  Is patient taking all medications as directed in the discharge summary?  Yes  Does patient understand their discharge instructions: Yes  Does patient have questions or concerns that need addressed prior to 7-14 day follow up office visit: no    Scheduled appointment with PCP within 7-14 days    Follow Up  Future Appointments   Date Time Provider Martell Bergman   10/6/2022  8:00 AM JUAN CARLOS Smith - CNP [facility]       Riley Hartman MA

## 2022-05-05 NOTE — ED PROVIDER NOTES
[facility] ED  EMERGENCY DEPARTMENT ENCOUNTER      Pt Name: Joseluis North  MRN: [id number]  [place] 1960  Date of evaluation: 5/4/2022  Provider: Kody Fuentes MD    [address] [place] [address]       Chief Complaint   Patient presents with    Abdominal Pain     mid-abdominal pain - onset this AM.     Emesis         HISTORY OF PRESENT ILLNESS   (Location/Symptom, Timing/Onset, Context/Setting, Quality, Duration, Modifying Factors, Severity)  Note limiting factors. Joseluis North is a 63 y.o. male who presents to the emergency department with concern for nausea vomiting. Patient has a history of stage IV cholangiocarcinoma. States he is having bowel movements but is still nauseous and vomiting. Denies any acute focal neurodeficits. Denies any urinary symptoms or any chest pain. HPI    Nursing Notes were reviewed. REVIEW OF SYSTEMS    (2-9 systems for level 4, 10 or more for level 5)     Review of Systems   All other systems reviewed and are negative. Except as noted above the remainder of the review of systems was reviewed and negative. PAST MEDICAL HISTORY     Past Medical History:   Diagnosis Date    Anxiety     Aspiration pneumonia due to inhalation of vomitus ([address] 10/15/2021    Chronic back pain     Depression     Gastroesophageal reflux disease without esophagitis 8/6/2020    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[facility] [address] 8/7/2020         SURGICAL HISTORY       Past Surgical History:   Procedure Laterality Date    CHOLECYSTECTOMY      ERCP  08/07/2020    stent insertion, cholangiopancreatography  with brushings.     ERCP  8/7/2020    ERCP STENT INSERTION performed by Ten Harris MD at [address] Left 09/18/2020    left subclavian port insertion    PORT SURGERY Left 9/18/2020    PORT INSERTION, SUBCLAVIAN, W/C-ARM performed by Jean Claude Lazcano DO at [facility] 10/15/2021    EGD BIOPSY performed by Jacob Griggs MD at [facility] OR         CURRENT MEDICATIONS       Previous Medications    FAMOTIDINE (PEPCID) 40 MG TABLET    Take 1 tablet by mouth every evening    LIDOCAINE-PRILOCAINE (EMLA) 2.5-2.5 % CREAM        MULTIPLE VITAMINS-MINERALS (THERAPEUTIC MULTIVITAMIN-MINERALS) TABLET    Take 1 tablet by mouth daily    NICOTINE (NICODERM CQ) 14 MG/24HR        NUTRITIONAL SUPPLEMENTS (BOOST) LIQD    Take 1 Can by mouth daily    OMEPRAZOLE (PRILOSEC) 20 MG DELAYED RELEASE CAPSULE    Take 1 capsule by mouth daily    ONDANSETRON (ZOFRAN ODT) 4 MG DISINTEGRATING TABLET    Take 1 tablet by mouth every 8 hours as needed for Nausea or Vomiting    PROBIOTIC PRODUCT (PROBIOTIC-10 PO)    Take by mouth daily       ALLERGIES     Asa [aspirin] and Chocolate    FAMILY HISTORY       Family History   Problem Relation Age of Onset    Hypertension Mother     Stroke Mother     High Blood Pressure Mother     Diabetes Father     Hypertension Father     High Blood Pressure Father     Heart Disease Father           SOCIAL HISTORY       Social History     Socioeconomic History    Marital status:      Spouse name: Aliya    Number of children: 2    Years of education: 15    Highest education level: None   Occupational History    Occupation:    Tobacco Use    Smoking status: Current Every Day Smoker     Packs/day: 0.25     Years: 20.00     Pack years: 5.00     Types: Cigarettes    Smokeless tobacco: Never Used   Vaping Use    Vaping Use: Never used   Substance and Sexual Activity    Alcohol use: Yes     Alcohol/week: 5.8 standard drinks     Types: 7 Standard drinks or equivalent per week     Comment:  Occ    Drug use: Yes     Types: Marijuana Marie Kales)     Comment: Occasionally    Sexual activity: Yes   Other Topics Concern    None   Social History Narrative    None     Social Determinants of Health     Financial Resource Strain: Low Risk     Difficulty of Paying Living Expenses: Not hard at all   Food Insecurity: No Food Insecurity    Worried About Running Out of Food in the Last Year: Never true    Brittney of Food in the Last Year: Never true   Transportation Needs:     Lack of Transportation (Medical): Not on file    Lack of Transportation (Non-Medical): Not on file   Physical Activity:     Days of Exercise per Week: Not on file    Minutes of Exercise per Session: Not on file   Stress:     Feeling of Stress : Not on file   Social Connections:     Frequency of Communication with Friends and Family: Not on file    Frequency of Social Gatherings with Friends and Family: Not on file    Attends Denominational Services: Not on file    Active Member of [address] [place] [address] [employer] or Organizations: Not on file    Attends Club or Organization Meetings: Not on file    Marital Status: Not on file   Intimate Partner Violence:     Fear of Current or Ex-Partner: Not on file    Emotionally Abused: Not on file    Physically Abused: Not on file    Sexually Abused: Not on file   Housing Stability:     Unable to Pay for Housing in the Last Year: Not on file    Number of [place] in the Last Year: Not on file    Unstable Housing in the Last Year: Not on file       SCREENINGS         [place] Coma Scale  Eye Opening: Spontaneous  Best Verbal Response: Oriented  Best Motor Response: Obeys commands  Shi Coma Scale Score: 15                     CIWA Assessment  BP: (!) 118/95  Pulse: 113                 PHYSICAL EXAM    (up to 7 for level 4, 8 or more for level 5)     ED Triage Vitals [05/04/22 2342]   BP Temp Temp src Pulse Resp SpO2 Height Weight   (!) 118/95 -- -- 113 22 95 % -- 148 lb (67.1 kg)       Physical Exam  Constitutional:       General: He is not in acute distress. Appearance: He is well-developed. He is not diaphoretic. HENT:      Head: Normocephalic and atraumatic. Eyes:      General:         Right eye: No discharge. Left eye: No discharge. Neck:      Trachea: No tracheal deviation.    Cardiovascular:      Rate and Rhythm: Normal rate and regular rhythm. Heart sounds: No murmur heard. No friction rub. Pulmonary:      Effort: Pulmonary effort is normal. No respiratory distress. Breath sounds: No stridor. No wheezing or rales. Chest:      Chest wall: No tenderness. Abdominal:      General: There is no distension. Palpations: Abdomen is soft. There is no mass. Tenderness: There is no abdominal tenderness. There is no guarding or rebound. Musculoskeletal:         General: No tenderness or deformity. Normal range of motion. Cervical back: Normal range of motion. Skin:     General: Skin is warm. Findings: No erythema or rash. Neurological:      Mental Status: He is alert and oriented to person, place, and time. Psychiatric:         Behavior: Behavior normal.         DIAGNOSTIC RESULTS     EKG: All EKG's are interpreted by the Emergency Department Physician who either signs or Co-signs this chart in the absence of a cardiologist.        RADIOLOGY:   Non-plain film images such as CT, Ultrasound and MRI are read by the radiologist. Plain radiographic images are visualized and preliminarily interpreted by the emergency physician with the below findings:        Interpretation per the Radiologist below, if available at the time of this note:    No orders to display         ED BEDSIDE ULTRASOUND:   Performed by ED Physician - none    LABS:  Labs Reviewed   CBC WITH AUTO DIFFERENTIAL - Abnormal; Notable for the following components:       Result Value    MCH 35.1 (*)     Seg Neutrophils 73 (*)     Lymphocytes 18 (*)     Eosinophils % 0 (*)     All other components within normal limits   HEPATIC FUNCTION PANEL - Abnormal; Notable for the following components:    Alkaline Phosphatase 257 (*)     Bilirubin, Direct 0.37 (*)     All other components within normal limits   LIPASE   URINALYSIS WITH MICROSCOPIC       All other labs were within normal range or not returned as of this dictation.     EMERGENCY DEPARTMENT COURSE and DIFFERENTIAL DIAGNOSIS/MDM:   Vitals:    Vitals:    05/04/22 2342   BP: (!) 118/95   Pulse: 113   Resp: 22   SpO2: 95%   Weight: 148 lb (67.1 kg)           MDM  Number of Diagnoses or Management Options  Diagnosis management comments: 27-year-old male presented with concern for nausea vomiting. Patient was having bowel movements and urgent CT scan was ordered to rule out potential obstruction due to patient's history but after conversation patient his wife that he preferred no CT imaging patient symptoms were treated. Reglan fluids provided a GI cocktail. Subsequent reevaluation patient was tolerating p.o. and felt better and was comfortable being discharged. Patient was provided with a prescription for Reglan extensive return precautions and time discharge patient was tolerating p.o. not hypotensive or tachycardic and otherwise clinically stable        REASSESSMENT          CRITICAL CARE TIME   Total Critical Care time was  minutes, excluding separately reportable procedures. There was a high probability of clinically significant/life threatening deterioration in the patient's condition which required my urgent intervention. CONSULTS:  None    PROCEDURES:  Unless otherwise noted below, none     Procedures        FINAL IMPRESSION      1. Non-intractable vomiting with nausea, unspecified vomiting type          DISPOSITION/PLAN   DISPOSITION Decision To Discharge 05/05/2022 01:47:27 AM      PATIENT REFERRED TO:  No follow-up provider specified. DISCHARGE MEDICATIONS:  New Prescriptions    METOCLOPRAMIDE (REGLAN) 10 MG TABLET    Take 1 tablet by mouth 4 times daily as needed (Nausea/vomiting)     Controlled Substances Monitoring:     No flowsheet data found.     (Please note that portions of this note were completed with a voice recognition program.  Efforts were made to edit the dictations but occasionally words are mis-transcribed.)    Khadar Worrell MD (electronically signed)  Attending Emergency Physician            Gabriele Simons MD  05/05/22 7510

## 2022-05-09 ENCOUNTER — HOSPITAL ENCOUNTER (EMERGENCY)
Age: 62
Discharge: HOME | End: 2022-05-09
Attending: EMERGENCY MEDICINE
Payer: COMMERCIAL

## 2022-05-09 VITALS
OXYGEN SATURATION: 96 % | RESPIRATION RATE: 14 BRPM | SYSTOLIC BLOOD PRESSURE: 112 MMHG | DIASTOLIC BLOOD PRESSURE: 92 MMHG | HEART RATE: 95 BPM | TEMPERATURE: 98 F

## 2022-05-09 DIAGNOSIS — R11.2 NON-INTRACTABLE VOMITING WITH NAUSEA, UNSPECIFIED VOMITING TYPE: Primary | ICD-10-CM

## 2022-05-09 PROCEDURE — 6360000002 HC RX W HCPCS: Performed by: EMERGENCY MEDICINE

## 2022-05-09 PROCEDURE — 96375 TX/PRO/DX INJ NEW DRUG ADDON: CPT

## 2022-05-09 PROCEDURE — 96374 THER/PROPH/DIAG INJ IV PUSH: CPT

## 2022-05-09 PROCEDURE — 6370000000 HC RX 637 (ALT 250 FOR IP): Performed by: EMERGENCY MEDICINE

## 2022-05-09 PROCEDURE — 99284 EMERGENCY DEPT VISIT MOD MDM: CPT

## 2022-05-09 PROCEDURE — 2580000003 HC RX 258: Performed by: EMERGENCY MEDICINE

## 2022-05-09 RX ORDER — DEXAMETHASONE 1 MG
2 TABLET ORAL DAILY
COMMUNITY

## 2022-05-09 RX ORDER — METOCLOPRAMIDE HYDROCHLORIDE 5 MG/ML
10 INJECTION INTRAMUSCULAR; INTRAVENOUS ONCE
Status: COMPLETED | OUTPATIENT
Start: 2022-05-09 | End: 2022-05-09

## 2022-05-09 RX ORDER — HYDROMORPHONE HYDROCHLORIDE 2 MG/1
2 TABLET ORAL EVERY 4 HOURS PRN
Status: ON HOLD | COMMUNITY
End: 2022-05-19 | Stop reason: HOSPADM

## 2022-05-09 RX ORDER — MORPHINE SULFATE 4 MG/ML
4 INJECTION, SOLUTION INTRAMUSCULAR; INTRAVENOUS ONCE
Status: COMPLETED | OUTPATIENT
Start: 2022-05-09 | End: 2022-05-09

## 2022-05-09 RX ORDER — PROMETHAZINE HYDROCHLORIDE 25 MG/1
25 TABLET ORAL EVERY 6 HOURS PRN
Status: ON HOLD | COMMUNITY
End: 2022-05-18

## 2022-05-09 RX ORDER — 0.9 % SODIUM CHLORIDE 0.9 %
1000 INTRAVENOUS SOLUTION INTRAVENOUS ONCE
Status: COMPLETED | OUTPATIENT
Start: 2022-05-09 | End: 2022-05-09

## 2022-05-09 RX ADMIN — LIDOCAINE HYDROCHLORIDE: 20 SOLUTION ORAL; TOPICAL at 20:42

## 2022-05-09 RX ADMIN — SODIUM CHLORIDE 1000 ML: 9 INJECTION, SOLUTION INTRAVENOUS at 20:40

## 2022-05-09 RX ADMIN — METOCLOPRAMIDE 10 MG: 5 INJECTION, SOLUTION INTRAMUSCULAR; INTRAVENOUS at 20:42

## 2022-05-09 RX ADMIN — MORPHINE SULFATE 4 MG: 4 INJECTION, SOLUTION INTRAMUSCULAR; INTRAVENOUS at 20:42

## 2022-05-09 ASSESSMENT — PAIN - FUNCTIONAL ASSESSMENT: PAIN_FUNCTIONAL_ASSESSMENT: 0-10

## 2022-05-09 ASSESSMENT — PAIN DESCRIPTION - LOCATION: LOCATION: ABDOMEN

## 2022-05-09 ASSESSMENT — PAIN SCALES - GENERAL: PAINLEVEL_OUTOF10: 7

## 2022-05-10 NOTE — ED PROVIDER NOTES
[facility] ED  EMERGENCY DEPARTMENT ENCOUNTER      Pt Name: Jeni Patrick  MRN: [id number]  [place] 1960  Date of evaluation: 5/9/2022  Provider: Gabriele Simons MD    [address] [place] [address]       Chief Complaint   Patient presents with    Emesis     patient is a cancer patient he is now on pallative care. he was seen here last week and given a gi cocktail. it helped until saturday. he was unable to get gi cocktail from hospice yet. HISTORY OF PRESENT ILLNESS   (Location/Symptom, Timing/Onset, Context/Setting, Quality, Duration, Modifying Factors, Severity)  Note limiting factors. Jeni Patrick is a 63 y.o. male who presents to the emergency department with concern for abdominal discomfort and nausea vomiting. I evaluate this patient days prior and at that time received a GI cocktail and is currently in palliative care and requesting a GI cocktail and no other invention. HPI    Nursing Notes were reviewed. REVIEW OF SYSTEMS    (2-9 systems for level 4, 10 or more for level 5)     Review of Systems   All other systems reviewed and are negative. Except as noted above the remainder of the review of systems was reviewed and negative. 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     ERCP  8/7/2020    ERCP STENT INSERTION performed by Chuck Lopes MD at [address] Left 09/18/2020    left subclavian port insertion    PORT SURGERY Left 9/18/2020    PORT INSERTION, SUBCLAVIAN, W/C-ARM performed by Kota Lezama DO at [address] 10/15/2021    EGD BIOPSY performed by Thanh Otero MD at [address] [facility]       Discharge Medication List as of 5/9/2022  8:50 PM      CONTINUE these medications which have NOT CHANGED    Details   dexamethasone (DECADRON) 1 MG tablet Take 2 mg by mouth dailyHistorical Med      HYDROmorphone (DILAUDID) 2 MG tablet Take 2 mg by mouth every 4 hours as needed. Historical Med      promethazine (PHENERGAN) 25 MG tablet Take 25 mg by mouth every 6 hours as neededHistorical Med      metoclopramide (REGLAN) 10 MG tablet Take 1 tablet by mouth 4 times daily as needed (Nausea/vomiting), Disp-15 tablet, R-3Normal      ondansetron (ZOFRAN ODT) 4 MG disintegrating tablet Take 1 tablet by mouth every 8 hours as needed for Nausea or Vomiting, Disp-270 tablet, R-1Normal      Nutritional Supplements (BOOST) LIQD Take 1 Can by mouth daily, Disp-30 each, R-2Normal      omeprazole (PRILOSEC) 20 MG delayed release capsule Take 1 capsule by mouth daily, Disp-90 capsule, R-0Normal      lidocaine-prilocaine (EMLA) 2.5-2.5 % cream Historical Med      nicotine (NICODERM CQ) 14 MG/24HR Historical Med      Probiotic Product (PROBIOTIC-10 PO) Take by mouth dailyHistorical Med      famotidine (PEPCID) 40 MG tablet Take 1 tablet by mouth every evening, Disp-90 tablet, R-3Normal      Multiple Vitamins-Minerals (THERAPEUTIC MULTIVITAMIN-MINERALS) tablet Take 1 tablet by mouth dailyHistorical Med             ALLERGIES     Asa [aspirin] and Chocolate    FAMILY HISTORY       Family History   Problem Relation Age of Onset    Hypertension Mother     Stroke Mother     High Blood Pressure Mother     Diabetes Father     Hypertension Father     High Blood Pressure Father     Heart Disease Father           SOCIAL HISTORY       Social History     Socioeconomic History    Marital status:      Spouse name: Geoffrey Solis    Number of children: 2    Years of education: 12    Highest education level: None   Occupational History    Occupation:    Tobacco Use    Smoking status: Current Every Day Smoker     Packs/day: 0.25     Years: 20.00     Pack years: 5.00     Types: Cigarettes    Smokeless tobacco: Never Used   Vaping Use    Vaping Use: Never used   Substance and Sexual Activity    Alcohol use: Yes     Alcohol/week: 5.8 standard drinks     Types: 7 Standard drinks or equivalent per week     Comment: Occ    Drug use: Yes     Types: Marijuana Idla[place]ash Aquino)     Comment: Occasionally    Sexual activity: Yes   Other Topics Concern    None   Social History Narrative    None     Social Determinants of Health     Financial Resource Strain: Low Risk     Difficulty of Paying Living Expenses: Not hard at all   Food Insecurity: No Food Insecurity    Worried About Running Out of Food in the Last Year: Never true    Brittney of Food in the Last Year: Never true   Transportation Needs:     Lack of Transportation (Medical): Not on file    Lack of Transportation (Non-Medical):  Not on file   Physical Activity:     Days of Exercise per Week: Not on file    Minutes of Exercise per Session: Not on file   Stress:     Feeling of Stress : Not on file   Social Connections:     Frequency of Communication with Friends and Family: Not on file    Frequency of Social Gatherings with Friends and Family: Not on file    Attends Rastafarian Services: Not on file    Active Member of [address] [place] [address] or Organizations: Not on file    Attends Club or Organization Meetings: Not on file    Marital Status: Not on file   Intimate Partner Violence:     Fear of Current or Ex-Partner: Not on file    Emotionally Abused: Not on file    Physically Abused: Not on file    Sexually Abused: Not on file   Housing Stability:     Unable to Pay for Housing in the Last Year: Not on file    Number of [place] in the Last Year: Not on file    Unstable Housing in the Last Year: Not on file       SCREENINGS Shi Coma Scale  Eye Opening: Spontaneous  Best Verbal Response: Oriented  Best Motor Response: Obeys commands  Shi Coma Scale Score: 15                     CIWA Assessment  BP: (!) 112/92  Pulse: 95                 PHYSICAL EXAM    (up to 7 for level 4, 8 or more for level 5)     ED Triage Vitals [05/09/22 1952]   BP Temp Temp Source Pulse Resp SpO2 Height Weight   (!) 112/92 98 °F (36.7 °C) Tympanic 95 14 98 % -- --       Physical Exam  Constitutional:       General: He is not in acute distress. Appearance: He is well-developed. He is not diaphoretic. HENT:      Head: Normocephalic and atraumatic. Eyes:      General:         Right eye: No discharge. Left eye: No discharge. Neck:      Trachea: No tracheal deviation. Cardiovascular:      Rate and Rhythm: Normal rate and regular rhythm. Heart sounds: No murmur heard. No friction rub. Pulmonary:      Effort: Pulmonary effort is normal. No respiratory distress. Breath sounds: No stridor. No wheezing or rales. Chest:      Chest wall: No tenderness. Abdominal:      General: There is no distension. Palpations: Abdomen is soft. There is no mass. Tenderness: There is no abdominal tenderness. There is no guarding or rebound. Musculoskeletal:         General: No tenderness or deformity. Normal range of motion. Cervical back: Normal range of motion. Skin:     General: Skin is warm. Findings: No erythema or rash. Neurological:      General: No focal deficit present. Mental Status: He is alert and oriented to person, place, and time.    Psychiatric:         Behavior: Behavior normal.         DIAGNOSTIC RESULTS     EKG: All EKG's are interpreted by the Emergency Department Physician who either signs or Co-signs this chart in the absence of a cardiologist.        RADIOLOGY:   Non-plain film images such as CT, Ultrasound and MRI are read by the radiologist. Plain radiographic images are visualized and preliminarily interpreted by the emergency physician with the below findings:        Interpretation per the Radiologist below, if available at the time of this note:    No orders to display         ED BEDSIDE ULTRASOUND:   Performed by ED Physician - none    LABS:  Labs Reviewed - No data to display    All other labs were within normal range or not returned as of this dictation. EMERGENCY DEPARTMENT COURSE and DIFFERENTIAL DIAGNOSIS/MDM:   Vitals:    Vitals:    05/09/22 2027 05/09/22 2042 05/09/22 2057 05/09/22 2112   BP:       Pulse:       Resp:       Temp:       TempSrc:       SpO2: 96% 97% 98% 96%           MDM  Number of Diagnoses or Management Options  Non-intractable vomiting with nausea, unspecified vomiting type  Diagnosis management comments: 62-year-old male presented requesting a GI cocktail. Patient was in palliative care and was provided with fluids antiemetic medication GI cocktail and IV pain meds. Patient did not want any other intervention and patient was discharged with extensive return precautions at time discharge patient was able to tolerate p.o. maintain his airway no acute respiratory distress otherwise medically stable. REASSESSMENT          CRITICAL CARE TIME   Total Critical Care time was  minutes, excluding separately reportable procedures. There was a high probability of clinically significant/life threatening deterioration in the patient's condition which required my urgent intervention. CONSULTS:  None    PROCEDURES:  Unless otherwise noted below, none     Procedures        FINAL IMPRESSION      1. Non-intractable vomiting with nausea, unspecified vomiting type          DISPOSITION/PLAN   DISPOSITION Decision To Discharge 05/09/2022 08:49:42 PM      PATIENT REFERRED TO:  No follow-up provider specified. DISCHARGE MEDICATIONS:  Discharge Medication List as of 5/9/2022  8:50 PM        Controlled Substances Monitoring:     No flowsheet data found.     (Please note that portions of this note were completed with a voice recognition program.  Efforts were made to edit the dictations but occasionally words are mis-transcribed.)    Maya Tovar MD (electronically signed)  Attending Emergency Physician            Maya Tovar MD  05/09/22 [phone]

## 2022-05-13 ENCOUNTER — HOSPITAL ENCOUNTER (INPATIENT)
Age: 62
LOS: 3 days | Discharge: HOME | DRG: 640 | End: 2022-05-16
Attending: EMERGENCY MEDICINE | Admitting: INTERNAL MEDICINE
Payer: COMMERCIAL

## 2022-05-13 DIAGNOSIS — E87.8 ELECTROLYTE IMBALANCE: ICD-10-CM

## 2022-05-13 DIAGNOSIS — R11.2 NON-INTRACTABLE VOMITING WITH NAUSEA, UNSPECIFIED VOMITING TYPE: Primary | ICD-10-CM

## 2022-05-13 DIAGNOSIS — R64 CACHEXIA (HCC): ICD-10-CM

## 2022-05-13 DIAGNOSIS — E86.0 DEHYDRATION: ICD-10-CM

## 2022-05-13 LAB
-: ABNORMAL
ABSOLUTE EOS #: <0.03 K/UL (ref 0–0.44)
ABSOLUTE IMMATURE GRANULOCYTE: 0.04 K/UL (ref 0–0.3)
ABSOLUTE LYMPH #: 1.63 K/UL (ref 1.1–3.7)
ABSOLUTE MONO #: 0.89 K/UL (ref 0.1–1.2)
ALBUMIN SERPL-MCNC: 3.3 G/DL (ref 3.5–5.2)
ALBUMIN/GLOBULIN RATIO: 0.9 (ref 1–2.5)
ALP BLD-CCNC: 181 U/L (ref 40–129)
ALT SERPL-CCNC: 13 U/L (ref 5–41)
ANION GAP SERPL CALCULATED.3IONS-SCNC: 7 MMOL/L (ref 9–17)
AST SERPL-CCNC: 20 U/L
BACTERIA: ABNORMAL
BASOPHILS # BLD: 0 % (ref 0–2)
BASOPHILS ABSOLUTE: 0.03 K/UL (ref 0–0.2)
BILIRUB SERPL-MCNC: 0.71 MG/DL (ref 0.3–1.2)
BILIRUBIN DIRECT: 0.26 MG/DL
BILIRUBIN URINE: ABNORMAL
BILIRUBIN, INDIRECT: 0.45 MG/DL (ref 0–1)
BUN BLDV-MCNC: 25 MG/DL (ref 8–23)
BUN/CREAT BLD: 34 (ref 9–20)
CALCIUM SERPL-MCNC: 9 MG/DL (ref 8.6–10.4)
CHLORIDE BLD-SCNC: 82 MMOL/L (ref 98–107)
CO2: 43 MMOL/L (ref 20–31)
COLOR: YELLOW
CREAT SERPL-MCNC: 0.74 MG/DL (ref 0.7–1.2)
EOSINOPHILS RELATIVE PERCENT: 0 % (ref 1–4)
EPITHELIAL CELLS UA: ABNORMAL /HPF (ref 0–5)
GFR AFRICAN AMERICAN: >60 ML/MIN
GFR NON-AFRICAN AMERICAN: >60 ML/MIN
GFR SERPL CREATININE-BSD FRML MDRD: ABNORMAL ML/MIN/{1.73_M2}
GFR SERPL CREATININE-BSD FRML MDRD: ABNORMAL ML/MIN/{1.73_M2}
GLUCOSE BLD-MCNC: 96 MG/DL (ref 70–99)
GLUCOSE URINE: NEGATIVE
HCT VFR BLD CALC: 41.9 % (ref 40.7–50.3)
HEMOGLOBIN: 14.3 G/DL (ref 13–17)
IMMATURE GRANULOCYTES: 0 %
KETONES, URINE: ABNORMAL
LEUKOCYTE ESTERASE, URINE: NEGATIVE
LIPASE: 21 U/L (ref 13–60)
LYMPHOCYTES # BLD: 13 % (ref 24–43)
MAGNESIUM: 2.5 MG/DL (ref 1.6–2.6)
MCH RBC QN AUTO: 34.9 PG (ref 25.2–33.5)
MCHC RBC AUTO-ENTMCNC: 34.1 G/DL (ref 28.4–34.8)
MCV RBC AUTO: 102.2 FL (ref 82.6–102.9)
MONOCYTES # BLD: 7 % (ref 3–12)
MUCUS: ABNORMAL
NITRITE, URINE: NEGATIVE
NRBC AUTOMATED: 0 PER 100 WBC
PDW BLD-RTO: 13.1 % (ref 11.8–14.4)
PH UA: 7 (ref 5–9)
PLATELET # BLD: 244 K/UL (ref 138–453)
PMV BLD AUTO: 8.9 FL (ref 8.1–13.5)
POTASSIUM SERPL-SCNC: 2.7 MMOL/L (ref 3.7–5.3)
PROTEIN UA: ABNORMAL
RBC # BLD: 4.1 M/UL (ref 4.21–5.77)
RBC UA: ABNORMAL /HPF (ref 0–2)
SEG NEUTROPHILS: 80 % (ref 36–65)
SEGMENTED NEUTROPHILS ABSOLUTE COUNT: 10.17 K/UL (ref 1.5–8.1)
SODIUM BLD-SCNC: 132 MMOL/L (ref 135–144)
SPECIFIC GRAVITY UA: 1.02 (ref 1.01–1.02)
TOTAL PROTEIN: 7.1 G/DL (ref 6.4–8.3)
TURBIDITY: CLEAR
URINE HGB: NEGATIVE
UROBILINOGEN, URINE: NORMAL
WBC # BLD: 12.8 K/UL (ref 3.5–11.3)
WBC UA: ABNORMAL /HPF (ref 0–5)

## 2022-05-13 PROCEDURE — 6360000002 HC RX W HCPCS: Performed by: EMERGENCY MEDICINE

## 2022-05-13 PROCEDURE — 2580000003 HC RX 258: Performed by: EMERGENCY MEDICINE

## 2022-05-13 PROCEDURE — 94761 N-INVAS EAR/PLS OXIMETRY MLT: CPT

## 2022-05-13 PROCEDURE — 6360000002 HC RX W HCPCS: Performed by: INTERNAL MEDICINE

## 2022-05-13 PROCEDURE — 96374 THER/PROPH/DIAG INJ IV PUSH: CPT

## 2022-05-13 PROCEDURE — 81001 URINALYSIS AUTO W/SCOPE: CPT

## 2022-05-13 PROCEDURE — 83690 ASSAY OF LIPASE: CPT

## 2022-05-13 PROCEDURE — 1200000000 HC SEMI PRIVATE

## 2022-05-13 PROCEDURE — 6370000000 HC RX 637 (ALT 250 FOR IP): Performed by: INTERNAL MEDICINE

## 2022-05-13 PROCEDURE — 83735 ASSAY OF MAGNESIUM: CPT

## 2022-05-13 PROCEDURE — 80076 HEPATIC FUNCTION PANEL: CPT

## 2022-05-13 PROCEDURE — 6370000000 HC RX 637 (ALT 250 FOR IP): Performed by: EMERGENCY MEDICINE

## 2022-05-13 PROCEDURE — 85025 COMPLETE CBC W/AUTO DIFF WBC: CPT

## 2022-05-13 PROCEDURE — 99285 EMERGENCY DEPT VISIT HI MDM: CPT

## 2022-05-13 PROCEDURE — 36415 COLL VENOUS BLD VENIPUNCTURE: CPT

## 2022-05-13 PROCEDURE — 80048 BASIC METABOLIC PNL TOTAL CA: CPT

## 2022-05-13 RX ORDER — ENOXAPARIN SODIUM 100 MG/ML
40 INJECTION SUBCUTANEOUS DAILY
Status: DISCONTINUED | OUTPATIENT
Start: 2022-05-13 | End: 2022-05-16 | Stop reason: HOSPADM

## 2022-05-13 RX ORDER — POTASSIUM CHLORIDE 7.45 MG/ML
20 INJECTION INTRAVENOUS ONCE
Status: COMPLETED | OUTPATIENT
Start: 2022-05-13 | End: 2022-05-13

## 2022-05-13 RX ORDER — PANTOPRAZOLE SODIUM 40 MG/1
40 TABLET, DELAYED RELEASE ORAL
Status: DISCONTINUED | OUTPATIENT
Start: 2022-05-14 | End: 2022-05-16 | Stop reason: HOSPADM

## 2022-05-13 RX ORDER — LORAZEPAM 2 MG/ML
1 INJECTION INTRAMUSCULAR ONCE
Status: COMPLETED | OUTPATIENT
Start: 2022-05-13 | End: 2022-05-13

## 2022-05-13 RX ORDER — ACETAMINOPHEN 650 MG/1
650 SUPPOSITORY RECTAL EVERY 6 HOURS PRN
Status: DISCONTINUED | OUTPATIENT
Start: 2022-05-13 | End: 2022-05-16 | Stop reason: HOSPADM

## 2022-05-13 RX ORDER — POLYETHYLENE GLYCOL 3350 17 G/17G
17 POWDER, FOR SOLUTION ORAL DAILY PRN
Status: DISCONTINUED | OUTPATIENT
Start: 2022-05-13 | End: 2022-05-16 | Stop reason: HOSPADM

## 2022-05-13 RX ORDER — METOCLOPRAMIDE 10 MG/1
10 TABLET ORAL 4 TIMES DAILY PRN
Status: DISCONTINUED | OUTPATIENT
Start: 2022-05-13 | End: 2022-05-16 | Stop reason: HOSPADM

## 2022-05-13 RX ORDER — ONDANSETRON 4 MG/1
4 TABLET, ORALLY DISINTEGRATING ORAL EVERY 8 HOURS PRN
Status: DISCONTINUED | OUTPATIENT
Start: 2022-05-13 | End: 2022-05-16 | Stop reason: HOSPADM

## 2022-05-13 RX ORDER — 0.9 % SODIUM CHLORIDE 0.9 %
1000 INTRAVENOUS SOLUTION INTRAVENOUS ONCE
Status: COMPLETED | OUTPATIENT
Start: 2022-05-13 | End: 2022-05-13

## 2022-05-13 RX ORDER — ACETAMINOPHEN 325 MG/1
650 TABLET ORAL EVERY 6 HOURS PRN
Status: DISCONTINUED | OUTPATIENT
Start: 2022-05-13 | End: 2022-05-16 | Stop reason: HOSPADM

## 2022-05-13 RX ORDER — SODIUM CHLORIDE 0.9 % (FLUSH) 0.9 %
5-40 SYRINGE (ML) INJECTION EVERY 12 HOURS SCHEDULED
Status: DISCONTINUED | OUTPATIENT
Start: 2022-05-13 | End: 2022-05-16 | Stop reason: HOSPADM

## 2022-05-13 RX ORDER — PROMETHAZINE HYDROCHLORIDE 25 MG/1
25 TABLET ORAL EVERY 6 HOURS PRN
Status: DISCONTINUED | OUTPATIENT
Start: 2022-05-13 | End: 2022-05-16 | Stop reason: HOSPADM

## 2022-05-13 RX ORDER — SODIUM CHLORIDE 0.9 % (FLUSH) 0.9 %
10 SYRINGE (ML) INJECTION PRN
Status: DISCONTINUED | OUTPATIENT
Start: 2022-05-13 | End: 2022-05-16 | Stop reason: HOSPADM

## 2022-05-13 RX ORDER — SODIUM CHLORIDE 9 MG/ML
INJECTION, SOLUTION INTRAVENOUS PRN
Status: DISCONTINUED | OUTPATIENT
Start: 2022-05-13 | End: 2022-05-16 | Stop reason: HOSPADM

## 2022-05-13 RX ORDER — ONDANSETRON 4 MG/1
4 TABLET, ORALLY DISINTEGRATING ORAL EVERY 8 HOURS PRN
Status: DISCONTINUED | OUTPATIENT
Start: 2022-05-13 | End: 2022-05-16 | Stop reason: SDUPTHER

## 2022-05-13 RX ORDER — POTASSIUM CHLORIDE 20 MEQ/1
40 TABLET, EXTENDED RELEASE ORAL PRN
Status: DISCONTINUED | OUTPATIENT
Start: 2022-05-13 | End: 2022-05-16 | Stop reason: HOSPADM

## 2022-05-13 RX ORDER — SODIUM CHLORIDE AND POTASSIUM CHLORIDE .9; .15 G/100ML; G/100ML
SOLUTION INTRAVENOUS CONTINUOUS
Status: DISCONTINUED | OUTPATIENT
Start: 2022-05-13 | End: 2022-05-16

## 2022-05-13 RX ORDER — SCOLOPAMINE TRANSDERMAL SYSTEM 1 MG/1
1 PATCH, EXTENDED RELEASE TRANSDERMAL
Status: DISCONTINUED | OUTPATIENT
Start: 2022-05-13 | End: 2022-05-16 | Stop reason: HOSPADM

## 2022-05-13 RX ORDER — ONDANSETRON 2 MG/ML
4 INJECTION INTRAMUSCULAR; INTRAVENOUS EVERY 6 HOURS PRN
Status: DISCONTINUED | OUTPATIENT
Start: 2022-05-13 | End: 2022-05-16 | Stop reason: HOSPADM

## 2022-05-13 RX ORDER — POTASSIUM CHLORIDE 7.45 MG/ML
10 INJECTION INTRAVENOUS PRN
Status: DISCONTINUED | OUTPATIENT
Start: 2022-05-13 | End: 2022-05-16 | Stop reason: HOSPADM

## 2022-05-13 RX ORDER — HYDROMORPHONE HYDROCHLORIDE 2 MG/1
2 TABLET ORAL EVERY 4 HOURS PRN
Status: DISCONTINUED | OUTPATIENT
Start: 2022-05-13 | End: 2022-05-16 | Stop reason: HOSPADM

## 2022-05-13 RX ORDER — DEXAMETHASONE 0.5 MG/1
2 TABLET ORAL DAILY
Status: DISCONTINUED | OUTPATIENT
Start: 2022-05-13 | End: 2022-05-16 | Stop reason: HOSPADM

## 2022-05-13 RX ORDER — SODIUM CHLORIDE 9 MG/ML
INJECTION, SOLUTION INTRAVENOUS CONTINUOUS
Status: DISCONTINUED | OUTPATIENT
Start: 2022-05-13 | End: 2022-05-15

## 2022-05-13 RX ADMIN — SODIUM CHLORIDE AND POTASSIUM CHLORIDE: .9; .15 SOLUTION INTRAVENOUS at 23:09

## 2022-05-13 RX ADMIN — HYDROMORPHONE HYDROCHLORIDE 2 MG: 2 TABLET ORAL at 23:10

## 2022-05-13 RX ADMIN — POTASSIUM CHLORIDE 20 MEQ: 10 INJECTION, SOLUTION INTRAVENOUS at 21:54

## 2022-05-13 RX ADMIN — SODIUM CHLORIDE 1000 ML: 9 INJECTION, SOLUTION INTRAVENOUS at 18:52

## 2022-05-13 RX ADMIN — LORAZEPAM 1 MG: 2 INJECTION INTRAMUSCULAR at 17:26

## 2022-05-13 RX ADMIN — SODIUM CHLORIDE 1000 ML: 9 INJECTION, SOLUTION INTRAVENOUS at 17:16

## 2022-05-13 ASSESSMENT — PAIN DESCRIPTION - DESCRIPTORS: DESCRIPTORS: ACHING;CRAMPING

## 2022-05-13 ASSESSMENT — PAIN SCALES - GENERAL
PAINLEVEL_OUTOF10: 8
PAINLEVEL_OUTOF10: 0

## 2022-05-13 ASSESSMENT — PAIN - FUNCTIONAL ASSESSMENT
PAIN_FUNCTIONAL_ASSESSMENT: NONE - DENIES PAIN
PAIN_FUNCTIONAL_ASSESSMENT: ACTIVITIES ARE NOT PREVENTED

## 2022-05-13 ASSESSMENT — PAIN DESCRIPTION - LOCATION: LOCATION: ABDOMEN

## 2022-05-13 NOTE — ED PROVIDER NOTES
[facility] ED  EMERGENCY DEPARTMENT ENCOUNTER      Pt Name: Joseluis North  MRN: [id number]  [place] 1960  Date of evaluation: 5/13/2022  Provider: Mj Martin MD    CHIEF COMPLAINT       Chief Complaint   Patient presents with    Emesis     Pt has had nausea/emesis x 3-4 weeks and has cancer of the bile duct. HISTORY OF PRESENT ILLNESS   (Location/Symptom, Timing/Onset, Context/Setting, Quality, Duration, Modifying Factors, Severity)  Note limiting factors. Joseluis North is a 63 y.o. male who presents to the emergency department      29-year-old male with a history of duct cancer presenting to the emergency department for evaluation of a 3 to 4-week history of nausea vomiting. Patient states his symptoms have progressively worsened and is barely able to keep down any food or fluid at home. He does have Zofran as well as Phenergan and Reglan. He is taking his medications as directed but still is unable to keep anything down. He has not any fevers or chills. He is denying any abdominal pain. No chest pain or shortness of breath. Does complain of generalized weakness from not being able to eat. No focal neurological deficits. No other acute concerns. Nursing Notes were reviewed. REVIEW OF SYSTEMS    (2-9 systems for level 4, 10 or more for level 5)     Review of Systems   All other systems reviewed and are negative. Except as noted above the remainder of the review of systems was reviewed and negative.        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  ERCP  8/7/2020    ERCP STENT INSERTION performed by Harpal Freeman MD at [address] Left 09/18/2020    left subclavian port insertion    PORT SURGERY Left 9/18/2020    PORT INSERTION, SUBCLAVIAN, W/C-ARM performed by Dominique Cochran DO at [address] [facility] 10/15/2021    EGD BIOPSY performed by Keaton Betancur MD at [facility]       Previous Medications    DEXAMETHASONE (DECADRON) 1 MG TABLET    Take 2 mg by mouth daily    FAMOTIDINE (PEPCID) 40 MG TABLET    Take 1 tablet by mouth every evening    HYDROMORPHONE (DILAUDID) 2 MG TABLET    Take 2 mg by mouth every 4 hours as needed.     LIDOCAINE-PRILOCAINE (EMLA) 2.5-2.5 % CREAM        METOCLOPRAMIDE (REGLAN) 10 MG TABLET    Take 1 tablet by mouth 4 times daily as needed (Nausea/vomiting)    MULTIPLE VITAMINS-MINERALS (THERAPEUTIC MULTIVITAMIN-MINERALS) TABLET    Take 1 tablet by mouth daily    NICOTINE (NICODERM CQ) 14 MG/24HR        NUTRITIONAL SUPPLEMENTS (BOOST) LIQD    Take 1 Can by mouth daily    OMEPRAZOLE (PRILOSEC) 20 MG DELAYED RELEASE CAPSULE    Take 1 capsule by mouth daily    ONDANSETRON (ZOFRAN ODT) 4 MG DISINTEGRATING TABLET    Take 1 tablet by mouth every 8 hours as needed for Nausea or Vomiting    PROBIOTIC PRODUCT (PROBIOTIC-10 PO)    Take by mouth daily    PROMETHAZINE (PHENERGAN) 25 MG TABLET    Take 25 mg by mouth every 6 hours as needed       ALLERGIES     Asa [aspirin] and Chocolate    FAMILY HISTORY       Family History   Problem Relation Age of Onset    Hypertension Mother     Stroke Mother     High Blood Pressure Mother     Diabetes Father     Hypertension Father     High Blood Pressure Father     Heart Disease Father           SOCIAL HISTORY       Social History     Socioeconomic History    Marital status:      Spouse name: Aliya    Number of children: 2    Years of education: 12    Highest education level: None   Occupational History    Occupation:    Tobacco Use    Smoking status: Current Every Day Smoker     Packs/day: 0.25     Years: 20.00     Pack years: 5.00     Types: Cigarettes    Smokeless tobacco: Never Used   Vaping Use    Vaping Use: Never used   Substance and Sexual Activity    Alcohol use: Yes     Alcohol/week: 5.8 standard drinks     Types: 7 Standard drinks or equivalent per week     Comment: Occ    Drug use: Yes     Types: Marijuana [place])     Comment: Occasionally    Sexual activity: Yes   Other Topics Concern    None   Social History Narrative    None     Social Determinants of Health     Financial Resource Strain: Low Risk     Difficulty of Paying Living Expenses: Not hard at all   Food Insecurity: No Food Insecurity    Worried About Running Out of Food in the Last Year: Never true    Brittney of Food in the Last Year: Never true   Transportation Needs:     Lack of Transportation (Medical): Not on file    Lack of Transportation (Non-Medical):  Not on file   Physical Activity:     Days of Exercise per Week: Not on file    Minutes of Exercise per Session: Not on file   Stress:     Feeling of Stress : Not on file   Social Connections:     Frequency of Communication with Friends and Family: Not on file    Frequency of Social Gatherings with Friends and Family: Not on file    Attends Hinduism Services: Not on file    Active Member of [address] [place] [address] or Organizations: Not on file    Attends Club or Organization Meetings: Not on file    Marital Status: Not on file   Intimate Partner Violence:     Fear of Current or Ex-Partner: Not on file    Emotionally Abused: Not on file    Physically Abused: Not on file    Sexually Abused: Not on file   Housing Stability:     Unable to Pay for Housing in the Last Year: Not on file    Number of [place] in the Last Year: Not on file    Unstable Housing in the Last Year: Not on file       [address] Opening: Spontaneous  Best Verbal Response: Oriented  Best Motor Response: Obeys commands  [place] Coma Scale Score: 15               PHYSICAL EXAM    (up to 7 for level 4, 8 or more for level 5)     ED Triage Vitals [05/13/22 1458]   BP Temp Temp Source Pulse Resp SpO2 Height Weight   (!) 94/58 97.8 °F (36.6 °C) Tympanic 92 18 95 % -- 148 lb (67.1 kg)       Physical Exam  Vitals and nursing note reviewed. Constitutional:       Comments: Thin and cachectic appearing. Afebrile and nontoxic-appearing. HENT:      Head: Normocephalic and atraumatic. Cardiovascular:      Rate and Rhythm: Normal rate and regular rhythm. Pulmonary:      Effort: Pulmonary effort is normal. No respiratory distress. Breath sounds: Normal breath sounds. Abdominal:      General: There is no distension. Palpations: Abdomen is soft. Tenderness: There is no abdominal tenderness. Skin:     General: Skin is warm and dry. Findings: No rash. Neurological:      General: No focal deficit present. Mental Status: He is oriented to person, place, and time.          DIAGNOSTIC RESULTS     EKG: All EKG's are interpreted by the Emergency Department Physician who either signs or Co-signs this chart in the absence of a cardiologist.        RADIOLOGY:   Non-plain film images such as CT, Ultrasound and MRI are read by the radiologist. Plain radiographic images are visualized and preliminarily interpreted by the emergency physician with the below findings:        Interpretation per the Radiologist below, if available at the time of this note:    No orders to display         ED BEDSIDE ULTRASOUND:   Performed by ED Physician - none    LABS:  Labs Reviewed   BASIC METABOLIC PANEL - Abnormal; Notable for the following components:       Result Value    BUN 25 (*)     Bun/Cre Ratio 34 (*)     Sodium 132 (*)     Potassium 2.7 (*)     Chloride 82 (*)     CO2 43 (*)     Anion Gap 7 (*)     All other components within normal limits CBC WITH AUTO DIFFERENTIAL - Abnormal; Notable for the following components:    WBC 12.8 (*)     RBC 4.10 (*)     MCH 34.9 (*)     Seg Neutrophils 80 (*)     Lymphocytes 13 (*)     Eosinophils % 0 (*)     Segs Absolute 10.17 (*)     All other components within normal limits   URINALYSIS WITH MICROSCOPIC - Abnormal; Notable for the following components:    Bilirubin Urine SMALL (*)     Ketones, Urine TRACE (*)     Protein, UA TRACE (*)     Bacteria, UA 1+ (*)     Mucus, UA 2+ (*)     All other components within normal limits   HEPATIC FUNCTION PANEL - Abnormal; Notable for the following components:    Albumin 3.3 (*)     Alkaline Phosphatase 181 (*)     Albumin/Globulin Ratio 0.9 (*)     All other components within normal limits   LIPASE   MAGNESIUM       All other labs were within normal range or not returned as of this dictation. EMERGENCY DEPARTMENT COURSE and DIFFERENTIAL DIAGNOSIS/MDM:   Vitals:    Vitals:    05/13/22 1727 05/13/22 1730 05/13/22 1800 05/13/22 1806   BP: 112/69 101/76 107/71    Pulse:       Resp:    16   Temp:       TempSrc:       SpO2: 94% 96% 96%    Weight:               MDM  Number of Diagnoses or Management Options  Cachexia (HCC)  Dehydration  Non-intractable vomiting with nausea, unspecified vomiting type  Diagnosis management comments: 53-year-old male intractable nausea and vomiting. Patient was given IV fluids and Ativan in the ED. Potassium 2.7.  20 mEq IV potassium placed. Repeat IV bolus ordered. Laboratory results reviewed. Patient did receive significant relief with Ativan. He is now able to tolerate some ice chips. Patient is excessively thin and has not been able to keep down food or fluids in any significant amount for the past several days. Discussed via telephone with Dr. Faustino Ledezma and patient will be admitted for continued hydration and control of his nausea and vomiting.       [facility]       REASSESSMENT          CRITICAL CARE TIME   Total Critical Care time was  minutes, excluding separately reportable procedures. There was a high probability of clinically significant/life threatening deterioration in the patient's condition which required my urgent intervention. CONSULTS:  IP CONSULT TO CASE MANAGEMENT    PROCEDURES:  Unless otherwise noted below, none     Procedures        FINAL IMPRESSION      1. Non-intractable vomiting with nausea, unspecified vomiting type    2. Dehydration    3. Cachexia ([address]    4. Electrolyte imbalance          DISPOSITION/PLAN   DISPOSITION Decision To Admit 05/13/2022 05:57:02 PM      PATIENT REFERRED TO:  No follow-up provider specified. DISCHARGE MEDICATIONS:  New Prescriptions    No medications on file     Controlled Substances Monitoring:     No flowsheet data found.     (Please note that portions of this note were completed with a voice recognition program.  Efforts were made to edit the dictations but occasionally words are mis-transcribed.)    Doreen Gitelman, MD (electronically signed)  Attending Emergency Physician             Doreen Gitelman, MD  05/13/22 [id number]

## 2022-05-14 PROBLEM — E43 SEVERE MALNUTRITION (HCC): Status: ACTIVE | Noted: 2022-05-14

## 2022-05-14 LAB
ABSOLUTE EOS #: 0.04 K/UL (ref 0–0.44)
ABSOLUTE IMMATURE GRANULOCYTE: 0.03 K/UL (ref 0–0.3)
ABSOLUTE LYMPH #: 1.27 K/UL (ref 1.1–3.7)
ABSOLUTE MONO #: 0.75 K/UL (ref 0.1–1.2)
ALBUMIN SERPL-MCNC: 3 G/DL (ref 3.5–5.2)
ALBUMIN/GLOBULIN RATIO: 1 (ref 1–2.5)
ALP BLD-CCNC: 143 U/L (ref 40–129)
ALT SERPL-CCNC: 9 U/L (ref 5–41)
ANION GAP SERPL CALCULATED.3IONS-SCNC: 5 MMOL/L (ref 9–17)
AST SERPL-CCNC: 16 U/L
BASOPHILS # BLD: 0 % (ref 0–2)
BASOPHILS ABSOLUTE: 0.03 K/UL (ref 0–0.2)
BILIRUB SERPL-MCNC: 0.64 MG/DL (ref 0.3–1.2)
BUN BLDV-MCNC: 21 MG/DL (ref 8–23)
BUN/CREAT BLD: 49 (ref 9–20)
CALCIUM SERPL-MCNC: 8.1 MG/DL (ref 8.6–10.4)
CHLORIDE BLD-SCNC: 89 MMOL/L (ref 98–107)
CO2: 37 MMOL/L (ref 20–31)
CREAT SERPL-MCNC: 0.43 MG/DL (ref 0.7–1.2)
EOSINOPHILS RELATIVE PERCENT: 0 % (ref 1–4)
GFR AFRICAN AMERICAN: >60 ML/MIN
GFR NON-AFRICAN AMERICAN: >60 ML/MIN
GFR SERPL CREATININE-BSD FRML MDRD: ABNORMAL ML/MIN/{1.73_M2}
GFR SERPL CREATININE-BSD FRML MDRD: ABNORMAL ML/MIN/{1.73_M2}
GLUCOSE BLD-MCNC: 81 MG/DL (ref 70–99)
HCT VFR BLD CALC: 38.2 % (ref 40.7–50.3)
HEMOGLOBIN: 12.8 G/DL (ref 13–17)
IMMATURE GRANULOCYTES: 0 %
LYMPHOCYTES # BLD: 14 % (ref 24–43)
MAGNESIUM: 2.2 MG/DL (ref 1.6–2.6)
MCH RBC QN AUTO: 34.1 PG (ref 25.2–33.5)
MCHC RBC AUTO-ENTMCNC: 33.5 G/DL (ref 28.4–34.8)
MCV RBC AUTO: 101.9 FL (ref 82.6–102.9)
MONOCYTES # BLD: 8 % (ref 3–12)
NRBC AUTOMATED: 0 PER 100 WBC
PDW BLD-RTO: 13.2 % (ref 11.8–14.4)
PLATELET # BLD: 213 K/UL (ref 138–453)
PMV BLD AUTO: 9 FL (ref 8.1–13.5)
POTASSIUM SERPL-SCNC: 2.7 MMOL/L (ref 3.7–5.3)
POTASSIUM SERPL-SCNC: 3.7 MMOL/L (ref 3.7–5.3)
RBC # BLD: 3.75 M/UL (ref 4.21–5.77)
SEG NEUTROPHILS: 78 % (ref 36–65)
SEGMENTED NEUTROPHILS ABSOLUTE COUNT: 7.31 K/UL (ref 1.5–8.1)
SODIUM BLD-SCNC: 131 MMOL/L (ref 135–144)
TOTAL PROTEIN: 5.9 G/DL (ref 6.4–8.3)
WBC # BLD: 9.4 K/UL (ref 3.5–11.3)

## 2022-05-14 PROCEDURE — 6360000002 HC RX W HCPCS: Performed by: INTERNAL MEDICINE

## 2022-05-14 PROCEDURE — 36415 COLL VENOUS BLD VENIPUNCTURE: CPT

## 2022-05-14 PROCEDURE — 83735 ASSAY OF MAGNESIUM: CPT

## 2022-05-14 PROCEDURE — 2580000003 HC RX 258: Performed by: INTERNAL MEDICINE

## 2022-05-14 PROCEDURE — 1200000000 HC SEMI PRIVATE

## 2022-05-14 PROCEDURE — 94761 N-INVAS EAR/PLS OXIMETRY MLT: CPT

## 2022-05-14 PROCEDURE — 97162 PT EVAL MOD COMPLEX 30 MIN: CPT

## 2022-05-14 PROCEDURE — 85025 COMPLETE CBC W/AUTO DIFF WBC: CPT

## 2022-05-14 PROCEDURE — 80053 COMPREHEN METABOLIC PANEL: CPT

## 2022-05-14 PROCEDURE — 84132 ASSAY OF SERUM POTASSIUM: CPT

## 2022-05-14 PROCEDURE — 6370000000 HC RX 637 (ALT 250 FOR IP): Performed by: INTERNAL MEDICINE

## 2022-05-14 RX ORDER — HYDROMORPHONE HCL 110MG/55ML
2 PATIENT CONTROLLED ANALGESIA SYRINGE INTRAVENOUS EVERY 4 HOURS PRN
Status: DISCONTINUED | OUTPATIENT
Start: 2022-05-14 | End: 2022-05-16

## 2022-05-14 RX ADMIN — ONDANSETRON 4 MG: 2 INJECTION INTRAMUSCULAR; INTRAVENOUS at 16:04

## 2022-05-14 RX ADMIN — HYDROMORPHONE HYDROCHLORIDE 2 MG: 2 INJECTION, SOLUTION INTRAMUSCULAR; INTRAVENOUS; SUBCUTANEOUS at 21:15

## 2022-05-14 RX ADMIN — SODIUM CHLORIDE, PRESERVATIVE FREE 10 ML: 5 INJECTION INTRAVENOUS at 08:02

## 2022-05-14 RX ADMIN — POTASSIUM CHLORIDE 10 MEQ: 7.46 INJECTION, SOLUTION INTRAVENOUS at 12:37

## 2022-05-14 RX ADMIN — HYDROMORPHONE HYDROCHLORIDE 2 MG: 2 INJECTION, SOLUTION INTRAMUSCULAR; INTRAVENOUS; SUBCUTANEOUS at 08:02

## 2022-05-14 RX ADMIN — ENOXAPARIN SODIUM 40 MG: 100 INJECTION SUBCUTANEOUS at 08:01

## 2022-05-14 RX ADMIN — METOCLOPRAMIDE 10 MG: 10 TABLET ORAL at 18:46

## 2022-05-14 RX ADMIN — SODIUM CHLORIDE AND POTASSIUM CHLORIDE: .9; .15 SOLUTION INTRAVENOUS at 16:12

## 2022-05-14 RX ADMIN — POTASSIUM CHLORIDE 10 MEQ: 7.46 INJECTION, SOLUTION INTRAVENOUS at 07:37

## 2022-05-14 RX ADMIN — DEXAMETHASONE 2 MG: 0.5 TABLET ORAL at 08:02

## 2022-05-14 RX ADMIN — POTASSIUM CHLORIDE 10 MEQ: 7.46 INJECTION, SOLUTION INTRAVENOUS at 09:33

## 2022-05-14 RX ADMIN — POTASSIUM CHLORIDE 10 MEQ: 7.46 INJECTION, SOLUTION INTRAVENOUS at 10:37

## 2022-05-14 RX ADMIN — POTASSIUM CHLORIDE 10 MEQ: 7.46 INJECTION, SOLUTION INTRAVENOUS at 11:39

## 2022-05-14 RX ADMIN — SODIUM CHLORIDE AND POTASSIUM CHLORIDE: .9; .15 SOLUTION INTRAVENOUS at 08:01

## 2022-05-14 RX ADMIN — PANTOPRAZOLE SODIUM 40 MG: 40 TABLET, DELAYED RELEASE ORAL at 08:02

## 2022-05-14 RX ADMIN — POTASSIUM CHLORIDE 10 MEQ: 7.46 INJECTION, SOLUTION INTRAVENOUS at 08:43

## 2022-05-14 ASSESSMENT — PAIN SCALES - GENERAL
PAINLEVEL_OUTOF10: 4
PAINLEVEL_OUTOF10: 7
PAINLEVEL_OUTOF10: 3
PAINLEVEL_OUTOF10: 8
PAINLEVEL_OUTOF10: 8
PAINLEVEL_OUTOF10: 3
PAINLEVEL_OUTOF10: 4

## 2022-05-14 ASSESSMENT — PAIN DESCRIPTION - DESCRIPTORS
DESCRIPTORS: ACHING

## 2022-05-14 ASSESSMENT — PAIN DESCRIPTION - ORIENTATION
ORIENTATION: MID
ORIENTATION: MID

## 2022-05-14 ASSESSMENT — PAIN DESCRIPTION - FREQUENCY
FREQUENCY: CONTINUOUS

## 2022-05-14 ASSESSMENT — PAIN DESCRIPTION - PAIN TYPE
TYPE: CHRONIC PAIN
TYPE: CHRONIC PAIN

## 2022-05-14 ASSESSMENT — PAIN DESCRIPTION - ONSET: ONSET: ON-GOING

## 2022-05-14 ASSESSMENT — PAIN DESCRIPTION - LOCATION
LOCATION: ABDOMEN

## 2022-05-14 NOTE — PROGRESS NOTES
Physical Therapy  Facility/Department: [facility] AT THE [facility] MED SURG  Physical Therapy Initial Assessment    Name: Livia Leong  : 1960  MRN: [id number]  Date of Service: 2022    Discharge Recommendations:  Continue to assess pending progress,Home with assist PRN          Patient Diagnosis(es): The primary encounter diagnosis was Non-intractable vomiting with nausea, unspecified vomiting type. Diagnoses of Dehydration, Cachexia ([address] and Electrolyte imbalance were also pertinent to this visit. Past Medical History:  has a past medical history of Anxiety, Aspiration pneumonia due to inhalation of vomitus ([address] Chronic back pain, Depression, Gastroesophageal reflux disease without esophagitis, and 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[address] Past Surgical History:  has a past surgical history that includes Mandible surgery; ERCP (2020); ERCP (2020); Port Surgery (Left, 2020); Port Surgery (Left, 2020); Upper gastrointestinal endoscopy (N/A, 10/15/2021); and Cholecystectomy. Assessment   Assessment: Patient is 63year old male with dx of cachexia who presents with decreased B LE strength, decreased functional mobility and endurance and decreased safety and balance during transfers and ambulation and would benefit from physical therapy to address all concerns and safely return to OF. Treatment Diagnosis: Difficulty walking  Therapy Prognosis: Good  Decision Making: Medium Complexity  Requires PT Follow-Up: Yes     Plan   Plan  Plan: 2 times a day 7 days a week (1x/daily on weekends)  Current Treatment Recommendations: Strengthening,ROM,Balance training,Gait training,Stair training,Functional mobility training,Transfer training,Endurance training,Home exercise program,Safety education & training,Patient/Caregiver education & training,Therapeutic activities,Neuromuscular re-education  Safety Devices  Type of Devices:  All fall risk precautions in place,Call light within reach,Patient at risk for falls,Left in chair,Nurse notified     Restrictions  Restrictions/Precautions  Restrictions/Precautions: General Precautions,Fall Risk     Subjective   General  Chart Reviewed: Yes  Patient assessed for rehabilitation services?: Yes  Response To Previous Treatment: Not applicable  Family / Caregiver Present: Yes  Referring Practitioner: Dr. Cameron Schreiber MD  Referral Date : 05/13/22  Diagnosis: Cachexia, R64  Follows Commands: Within Functional Limits  Subjective  Subjective: Patient reports abdominal pain and nausea and hasn't been able to keep anything down all night. Pleasant and agreeable to PT eval.         Social/Functional History  Social/Functional History  Lives With: Spouse  Type of Home: House  Home Layout: Two level,Able to Live on Main level with bedroom/bathroom  Home Access: Stairs to enter without rails  Entrance Stairs - Number of Steps: 1  Home Equipment: [employer] Help From: Family  ADL Assistance: Independent  Homemaking Assistance: Needs assistance  Homemaking Responsibilities: No  Ambulation Assistance: Independent  Transfer Assistance: Independent  Additional Comments: Patient's wife does IADL's, he uses cane occasionally for ambulation in the home. Vision/Hearing  Hearing: Within functional limits    Cognition   Orientation  Overall Orientation Status: Within Functional Limits     Objective   Pulse: 76  Heart Rate Source: Monitor; Apical  BP: 109/80  BP Location: Right upper arm  Patient Position: Semi fowlers  MAP (Calculated): 89.67  Resp: 16  SpO2: 96 %  O2 Device: None (Room air)        Gross Assessment  AROM: Within functional limits  Strength: Generally decreased, functional                    Bed mobility  Supine to Sit: Supervision  Scooting: Supervision  Transfers  Sit to Stand: Stand by assistance  Stand to sit: Stand by assistance  Ambulation  WB Status: FWB  Ambulation  Surface: level tile  Device: No Device  Assistance: Stand by assistance  Distance: Pt amb 15ft with no AD and SBA with no LOB or fatigue noted. Balance  Sitting - Static: Good  Sitting - Dynamic: Fair;+  Standing - Static: Fair  Standing - Dynamic: Fair         Goals  Short Term Goals  Time Frame for Short term goals: 20 days  Short term goal 1: Patient to complete all transfers mod. INd with no LOB to decrease fall risk. Short term goal 2: Patient to ambulate [id number] with no AD and SUP with no LOB or fatigue to improve mobility. Short term goal 3: Patient to tolerate 20-30 min of ther ex/act to improve functional strength. Short term goal 4: Patient to ascend/descend one step with no HR and SUP with no LOB to decrease fall risk.        Therapy Time   Individual Concurrent Group Co-treatment   Time In 0746         Time Out 0800         Minutes 14         Timed Code Treatment Minutes: [address] [facility] Carrie Sam, PT, DPT

## 2022-05-14 NOTE — PLAN OF CARE
Problem: Discharge Planning  Goal: Discharge to home or other facility with appropriate resources  Outcome: Progressing  Flowsheets (Taken 5/14/2022 1345)  Discharge to home or other facility with appropriate resources: Identify barriers to discharge with patient and caregiver     Problem: Safety - Adult  Goal: Free from fall injury  Outcome: Progressing   Pt. Remains free of falls, appropriate fall precautions in place.      Problem: Pain  Goal: Verbalizes/displays adequate comfort level or baseline comfort level  Outcome: Progressing  Flowsheets (Taken 5/14/2022 1345)  Verbalizes/displays adequate comfort level or baseline comfort level:   Assess pain using appropriate pain scale   Encourage patient to monitor pain and request assistance   Administer analgesics based on type and severity of pain and evaluate response

## 2022-05-14 NOTE — PROGRESS NOTES
Comprehensive Nutrition Assessment    Type and Reason for Visit:  Initial    Nutrition Recommendations/Plan:   1. Will provide diet instruction for energy-dense foods  2. Protein with each meal when able to tolerate  3. Currently on Clear liquid diet, ordered Ensure Elive when pt is advanced past Clears     Malnutrition Assessment:  Malnutrition Status:  Severe malnutrition (05/14/22 1037)    Context:  Acute Illness     Findings of the 6 clinical characteristics of malnutrition:  Energy Intake:  75% or less of estimated energy requirements for 7 or more days  Weight Loss:  Greater than 5% over 1 month (19.5% x 1 month)     Body Fat Loss: Moderate body fat loss Orbital,Triceps,Fat Overlying Ribs   Muscle Mass Loss: Moderate muscle mass loss Temples (temporalis),Hand (interosseous),Scapula (trapezius),Calf (gastrocnemius),Thigh (quadraceps),Clavicles (pectoralis & deltoids)  Fluid Accumulation:  No significant fluid accumulation Extremities   Strength:  Not Performed    Nutrition Assessment:    Severe malnutrition related to impaired nutrient utilization as evidenced by intake 0-25%,vomiting,nausea,poor intake prior to admission,constipation,weight loss,severe loss of subcutaneous fat,severe muscle loss. Pt is admitted with a poor appetite, poor oral intakes which have been going on for 4-5 weeks. Pt has a history of duct cancer and will need more chemotherapy, however reports can't continue until get his nutrition status in check. Pt cannot hold any food down and will continue to catabolize. Anti-nausea medications do not seem to help patients keep foods down. Subsequently, pt is losing weight. Wt loss of 19.5% over past 5 weeks is noted and found significant and is considered severe. Patient states he has an allergy to chocolate, and does not eat cherry. He likes strawberry and vanilla. High nutrition risk. Severe malnutrition.      Nutrition Related Findings:    hypoactive bowel sounds (4 quadrants) Current Nutrition Intake & Therapies:    Average Meal Intake: 0%  Average Supplements Intake: 0%  ADULT DIET; Clear Liquid    Anthropometric Measures:  Height: 5' 9\" (175.3 cm)  Ideal Body Weight (IBW): 160 lbs (73 kg)    Admission Body Weight: 147 lb 14.9 oz (67.1 kg)  Current Body Weight: 136 lb 14.5 oz (62.1 kg), 85.6 %   IBW.  Weight Source: Bed Scale  Current BMI (kg/m2): 20.2  Usual Body Weight: 169 lb (76.7 kg)  % Weight Change (Calculated): -19  Weight Adjustment For: No Adjustment                 BMI Categories: Normal Weight (BMI 22.0 to 24.9) age over 72    Estimated Daily Nutrient Needs:  Energy Requirements Based On: Kcal/kg  Weight Used for Energy Requirements: Current  Energy (kcal/day): 0564-2168 (20-25)  Weight Used for Protein Requirements: Ideal  Protein (g/day):  (1.2-1.4)  Method Used for Fluid Requirements: 1 ml/kcal  Fluid (ml/day): 1800    Nutrition Diagnosis:   · Severe malnutrition related to impaired nutrient utilization as evidenced by intake 0-25%,vomiting,nausea,poor intake prior to admission,constipation,weight loss,severe loss of subcutaneous fat,severe muscle loss (hypoactive bowel sounds)    Recent Labs     05/13/22  1656 05/14/22  0520   * 131*   K 2.7* 2.7*   CL 82* 89*   CO2 43* 37*   BUN 25* 21   CREATININE 0.74 0.43*   GLUCOSE 96 81   ALT 13 9   ALKPHOS 181* 143*   GFR                            Lab Results   Component Value Date    LABALBU 3.0 05/14/2022          Nutrition Interventions:   Food and/or Nutrient Delivery: Continue Current Diet,Modify Current Diet,Start Oral Nutrition Supplement  Nutrition Education/Counseling: Education initiated  Coordination of Nutrition Care: Continue to monitor while inpatient  Plan of Care discussed with: Patient    Goals:  Previous Goal Met: Progressing toward Goal(s)  Goals: Meet at least 75% of estimated needs       Nutrition Monitoring and Evaluation:   Behavioral-Environmental Outcomes: None Identified  Food/Nutrient Intake Outcomes: Diet Advancement/Tolerance,Food and Nutrient Intake,Supplement Intake  Physical Signs/Symptoms Outcomes: Biochemical Data,Constipation,Nausea or Vomiting,Nutrition Focused Physical Findings,Weight    Discharge Planning:    Continue Oral Nutrition Supplement,Continue current diet     Emily Stallworth RD, LD  Contact: [phone]

## 2022-05-14 NOTE — PROGRESS NOTES
[facility] Pt able to ambulate from cart to bed. Pt alert and oriented x4. Pt wife at bedside. Pt denies any pain at this time. Requesting popsicle at this time. Call light is within reach. Will continue to monitor.

## 2022-05-14 NOTE — H&P
Patient:  Hua Hamilton  MRN: [id number]    Chief Complaint:    Chief Complaint   Patient presents with    Emesis     Pt has had nausea/emesis x 3-4 weeks and has cancer of the bile duct. History Obtained From:  patient, electronic medical record    PCP: JUAN CARLOS Fernández CNP    History of Present Illness: The patient is a 63 y.o. male who presents with duct cancer presenting to the emergency department for evaluation of a 3 to 4-week history of nausea vomiting. Patient states his symptoms have progressively worsened and is barely able to keep down any food or fluid at home. He does have Zofran as well as Phenergan and Reglan. He is taking his medications as directed but still is unable to keep anything down. He has not any fevers or chills. He is denying any abdominal pain. No chest pain or shortness of breath. Does complain of generalized weakness from not being able to eat. No focal neurological deficits. No other acute concerns. Past Medical History:        Diagnosis Date    Anxiety     Aspiration pneumonia due to inhalation of vomitus ([address] 10/15/2021    Chronic back pain     Depression     Gastroesophageal reflux disease without esophagitis 8/6/2020    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 8/7/2020       Past Surgical History:        Procedure Laterality Date    CHOLECYSTECTOMY      ERCP  08/07/2020    stent insertion, cholangiopancreatography  with brushings.     ERCP  8/7/2020    ERCP STENT INSERTION performed by Faustino Martinez MD at [address] PORT SURGERY Left 09/18/2020    left subclavian port insertion    PORT SURGERY Left 9/18/2020    PORT INSERTION, SUBCLAVIAN, W/C-ARM performed by Jacklyn Veras DO at [facility] N/A 10/15/2021    EGD BIOPSY performed by Christiano Wagner MD at [facility] OR       Family History:       Problem Relation Age of Onset    Hypertension Mother     Stroke Mother     High Blood Pressure Mother    [facility] Diabetes Father     Hypertension Father     High Blood Pressure Father     Heart Disease Father        Social History:   TOBACCO:   reports that he has been smoking cigarettes. He has a 5.00 pack-year smoking history. He has never used smokeless tobacco.  ETOH:   reports current alcohol use of about 5.8 standard drinks of alcohol per week. Allergies:  Asa [aspirin] and Chocolate    Medications Prior to Admission:    Prior to Admission medications    Medication Sig Start Date End Date Taking? Authorizing Provider   dexamethasone (DECADRON) 1 MG tablet Take 2 mg by mouth daily   Yes Historical Provider, MD   HYDROmorphone (DILAUDID) 2 MG tablet Take 2 mg by mouth every 4 hours as needed.    Yes Historical Provider, MD   metoclopramide (REGLAN) 10 MG tablet Take 1 tablet by mouth 4 times daily as needed (Nausea/vomiting) 5/5/22  Yes Betsy Poor, MD   promethazine (PHENERGAN) 25 MG tablet Take 25 mg by mouth every 6 hours as needed    Historical Provider, MD   ondansetron (ZOFRAN ODT) 4 MG disintegrating tablet Take 1 tablet by mouth every 8 hours as needed for Nausea or Vomiting 4/5/22   JUAN CARLOS Hendricks CNP   Nutritional Supplements (BOOST) LIQD Take 1 Can by mouth daily 4/5/22   JUAN CARLOS Hendricks CNP   omeprazole (PRILOSEC) 20 MG delayed release capsule Take 1 capsule by mouth daily 2/28/22   JUAN CARLOS Hendricks CNP   lidocaine-prilocaine (EMLA) 2.5-2.5 % cream  1/11/22   Historical Provider, MD   nicotine (Norman Poet) 14 MG/24HR  11/16/21   Historical Provider, MD   Probiotic Product (PROBIOTIC-10 PO) Take by mouth daily    Historical Provider, MD   famotidine (PEPCID) 40 MG tablet Take 1 tablet by mouth every evening  Patient not taking: Reported on 5/4/2022 9/24/21   JUAN CARLOS Hendricks CNP   Multiple Vitamins-Minerals (THERAPEUTIC MULTIVITAMIN-MINERALS) tablet Take 1 tablet by mouth daily    Historical Provider, MD       Review of Systems:  Constitutional:negative  for fevers, and negative for chills. Eyes: negative for visual disturbance   ENT: negative for sore throat, negative nasal congestion, and negative for earache  Respiratory: negative for shortness of breath, negative for cough, and negative for wheezing  Cardiovascular: negative for chest pain, negative for palpitations, and negative for syncope  Gastrointestinal: negative for abdominal pain, positive for nausea,positive for vomiting, negative for diarrhea, negative for constipation, and negative for hematochezia or melena  Genitourinary: negative for dysuria, negative for urinary urgency, negative for urinary frequency, and negative for hematuria  Skin: negative for skin rash, and negative for skin lesions  Neurological: negative for unilateral weakness, numbness or tingling. Physical Exam:    Vitals:   Temp: 98.1 °F (36.7 °C)  BP: 99/77  Resp: 16  Pulse: 66  SpO2: 96 %  24HR INTAKE/OUTPUT:      Intake/Output Summary (Last 24 hours) at 5/14/2022 1669  Last data filed at 5/13/2022 1816  Gross per 24 hour   Intake 1000 ml   Output --   Net 1000 ml       Exam:  GEN:  alert & appropriate appearance  EYES: normal eyes, non icteric  NECK: supple with no lymphadenopathy,  no bruits noted  PULM: clear with no rales or rhonchi.  No wheezes  COR: regular with rate and rhythm  ABD:  soft & NT, No distension  EXT:   no edema noted  NEURO: follows commands, VENEGAS, no deficits  SKIN:  negative  -----------------------------------------------------------------  Diagnostic Data:   Lab Results   Component Value Date    WBC 9.4 05/14/2022    HGB 12.8 (L) 05/14/2022     05/14/2022       Lab Results   Component Value Date    BUN 21 05/14/2022    CREATININE 0.43 (L) 05/14/2022     (L) 05/14/2022    K 2.7 (LL) 05/14/2022    CALCIUM 8.1 (L) 05/14/2022    CL 89 (L) 05/14/2022    CO2 37 (H) 05/14/2022    LABGLOM >60 05/14/2022       Lab Results   Component Value Date    WBCUA 0 TO 2 05/13/2022    RBCUA 0 TO 2 05/13/2022    EPITHUA 0 TO 2 05/13/2022 LEUKOCYTESUR NEGATIVE 2022    SPECGRAV 1.020 2022    GLUCOSEU NEGATIVE 2022    KETUA TRACE (A) 2022    PROTEINU TRACE (A) 2022    HGBUR NEGATIVE 2022    CASTUA NOT REPORTED 10/09/2021    CRYSTUA NOT REPORTED 10/09/2021    BACTERIA 1+ (A) 2022    YEAST NOT REPORTED 10/09/2021       Lab Results   Component Value Date    TROPONINT NOT REPORTED 2020    PROBNP 198 2020       No results found. Assessment:    Principal Problem:    Intractable vomiting with nausea  Resolved Problems:    * No resolved hospital problems. 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    Elevated bilirubin 2020    Elevated CA 19-9 level 2020    Alcohol abuse 2020    Cholecystitis 2020    Gastroesophageal reflux disease without esophagitis 2020    Alcohol use 2020    Portal hypertension ([address] 2020    Positive FIT (fecal immunochemical test) 2020       Plan:     · This patient requires inpatient admission because of hypokalemia and electrolyte abnormality due to intractable vomiting related to malignancy  · Factors affecting the medical complexity of this patient include Principal Problem:  ·   Intractable vomiting with nausea  · Resolved Problems:  ·   * No resolved hospital problems.  *  ·   · Estimated length of stay is 3 days  · Electrolyte correction  · High risk medication monitorin  · Antiemetics    CORE MEASURES  Core measures including DVT prophylaxis, Code Status, Nutrition, Therapy Options, chart reviewed and advance directives reviewed at this visit.     Gopi Cardenas MD, MD  5/14/2022, 7:22 AM

## 2022-05-14 NOTE — PROGRESS NOTES
Pt alert and oriented x4 sitting at edge of bed. Vitals and assessment completed at this time. Pt had 1 episode of emesis. States zofran did not help and wants to try PO reglan. See MAR. Pt denies any further needs at this time. Call light is within reach. Will continue to monitor.

## 2022-05-15 ENCOUNTER — APPOINTMENT (OUTPATIENT)
Dept: GENERAL RADIOLOGY | Age: 62
DRG: 640 | End: 2022-05-15
Payer: COMMERCIAL

## 2022-05-15 LAB
ALBUMIN SERPL-MCNC: 2.5 G/DL (ref 3.5–5.2)
ALBUMIN/GLOBULIN RATIO: 0.8 (ref 1–2.5)
ALP BLD-CCNC: 183 U/L (ref 40–129)
ALT SERPL-CCNC: 14 U/L (ref 5–41)
ANION GAP SERPL CALCULATED.3IONS-SCNC: 9 MMOL/L (ref 9–17)
AST SERPL-CCNC: 24 U/L
BILIRUB SERPL-MCNC: 1.42 MG/DL (ref 0.3–1.2)
BUN BLDV-MCNC: 18 MG/DL (ref 8–23)
BUN/CREAT BLD: 37 (ref 9–20)
CALCIUM SERPL-MCNC: 8.3 MG/DL (ref 8.6–10.4)
CHLORIDE BLD-SCNC: 91 MMOL/L (ref 98–107)
CO2: 32 MMOL/L (ref 20–31)
CREAT SERPL-MCNC: 0.49 MG/DL (ref 0.7–1.2)
GFR AFRICAN AMERICAN: >60 ML/MIN
GFR NON-AFRICAN AMERICAN: >60 ML/MIN
GFR SERPL CREATININE-BSD FRML MDRD: ABNORMAL ML/MIN/{1.73_M2}
GFR SERPL CREATININE-BSD FRML MDRD: ABNORMAL ML/MIN/{1.73_M2}
GLUCOSE BLD-MCNC: 70 MG/DL (ref 70–99)
POTASSIUM SERPL-SCNC: 3.6 MMOL/L (ref 3.7–5.3)
SODIUM BLD-SCNC: 132 MMOL/L (ref 135–144)
TOTAL PROTEIN: 5.8 G/DL (ref 6.4–8.3)

## 2022-05-15 PROCEDURE — 36415 COLL VENOUS BLD VENIPUNCTURE: CPT

## 2022-05-15 PROCEDURE — 94761 N-INVAS EAR/PLS OXIMETRY MLT: CPT

## 2022-05-15 PROCEDURE — 80053 COMPREHEN METABOLIC PANEL: CPT

## 2022-05-15 PROCEDURE — 97116 GAIT TRAINING THERAPY: CPT

## 2022-05-15 PROCEDURE — 6360000002 HC RX W HCPCS: Performed by: INTERNAL MEDICINE

## 2022-05-15 PROCEDURE — 6370000000 HC RX 637 (ALT 250 FOR IP): Performed by: INTERNAL MEDICINE

## 2022-05-15 PROCEDURE — 74018 RADEX ABDOMEN 1 VIEW: CPT

## 2022-05-15 PROCEDURE — 1200000000 HC SEMI PRIVATE

## 2022-05-15 PROCEDURE — 2580000003 HC RX 258: Performed by: INTERNAL MEDICINE

## 2022-05-15 RX ORDER — BISACODYL 10 MG
10 SUPPOSITORY, RECTAL RECTAL DAILY PRN
Status: DISCONTINUED | OUTPATIENT
Start: 2022-05-15 | End: 2022-05-16 | Stop reason: HOSPADM

## 2022-05-15 RX ADMIN — ENOXAPARIN SODIUM 40 MG: 100 INJECTION SUBCUTANEOUS at 07:34

## 2022-05-15 RX ADMIN — SODIUM CHLORIDE AND POTASSIUM CHLORIDE: .9; .15 SOLUTION INTRAVENOUS at 09:52

## 2022-05-15 RX ADMIN — HYDROMORPHONE HYDROCHLORIDE 2 MG: 2 INJECTION, SOLUTION INTRAMUSCULAR; INTRAVENOUS; SUBCUTANEOUS at 01:13

## 2022-05-15 RX ADMIN — ONDANSETRON 4 MG: 2 INJECTION INTRAMUSCULAR; INTRAVENOUS at 07:34

## 2022-05-15 RX ADMIN — DEXAMETHASONE 2 MG: 0.5 TABLET ORAL at 07:35

## 2022-05-15 RX ADMIN — HYDROMORPHONE HYDROCHLORIDE 2 MG: 2 INJECTION, SOLUTION INTRAMUSCULAR; INTRAVENOUS; SUBCUTANEOUS at 12:26

## 2022-05-15 RX ADMIN — BISACODYL 10 MG: 10 SUPPOSITORY RECTAL at 10:33

## 2022-05-15 RX ADMIN — PANTOPRAZOLE SODIUM 40 MG: 40 TABLET, DELAYED RELEASE ORAL at 07:35

## 2022-05-15 RX ADMIN — SODIUM CHLORIDE AND POTASSIUM CHLORIDE: .9; .15 SOLUTION INTRAVENOUS at 22:20

## 2022-05-15 RX ADMIN — HYDROMORPHONE HYDROCHLORIDE 2 MG: 2 INJECTION, SOLUTION INTRAMUSCULAR; INTRAVENOUS; SUBCUTANEOUS at 17:19

## 2022-05-15 RX ADMIN — ONDANSETRON 4 MG: 2 INJECTION INTRAMUSCULAR; INTRAVENOUS at 16:20

## 2022-05-15 RX ADMIN — HYDROMORPHONE HYDROCHLORIDE 2 MG: 2 INJECTION, SOLUTION INTRAMUSCULAR; INTRAVENOUS; SUBCUTANEOUS at 21:48

## 2022-05-15 RX ADMIN — BISACODYL 10 MG: 5 TABLET, COATED ORAL at 16:19

## 2022-05-15 RX ADMIN — HYDROMORPHONE HYDROCHLORIDE 2 MG: 2 INJECTION, SOLUTION INTRAMUSCULAR; INTRAVENOUS; SUBCUTANEOUS at 07:41

## 2022-05-15 RX ADMIN — SODIUM CHLORIDE, PRESERVATIVE FREE 10 ML: 5 INJECTION INTRAVENOUS at 07:44

## 2022-05-15 RX ADMIN — SODIUM CHLORIDE AND POTASSIUM CHLORIDE: .9; .15 SOLUTION INTRAVENOUS at 00:59

## 2022-05-15 ASSESSMENT — PAIN SCALES - GENERAL
PAINLEVEL_OUTOF10: 4
PAINLEVEL_OUTOF10: 8
PAINLEVEL_OUTOF10: 7
PAINLEVEL_OUTOF10: 4
PAINLEVEL_OUTOF10: 6
PAINLEVEL_OUTOF10: 7
PAINLEVEL_OUTOF10: 8
PAINLEVEL_OUTOF10: 7
PAINLEVEL_OUTOF10: 0
PAINLEVEL_OUTOF10: 6

## 2022-05-15 ASSESSMENT — PAIN DESCRIPTION - FREQUENCY
FREQUENCY: CONTINUOUS

## 2022-05-15 ASSESSMENT — PAIN DESCRIPTION - ORIENTATION
ORIENTATION: MID

## 2022-05-15 ASSESSMENT — PAIN DESCRIPTION - LOCATION
LOCATION: ABDOMEN

## 2022-05-15 ASSESSMENT — PAIN DESCRIPTION - ONSET: ONSET: ON-GOING

## 2022-05-15 ASSESSMENT — PAIN - FUNCTIONAL ASSESSMENT: PAIN_FUNCTIONAL_ASSESSMENT: ACTIVITIES ARE NOT PREVENTED

## 2022-05-15 ASSESSMENT — PAIN DESCRIPTION - DESCRIPTORS
DESCRIPTORS: ACHING

## 2022-05-15 ASSESSMENT — PAIN DESCRIPTION - PAIN TYPE
TYPE: CHRONIC PAIN
TYPE: CHRONIC PAIN

## 2022-05-15 NOTE — PLAN OF CARE
Problem: Discharge Planning  Goal: Discharge to home or other facility with appropriate resources  Outcome: Progressing  Flowsheets (Taken 5/15/2022 1400)  Discharge to home or other facility with appropriate resources: Identify barriers to discharge with patient and caregiver     Problem: Safety - Adult  Goal: Free from fall injury  Outcome: Progressing  Flowsheets (Taken 5/15/2022 0643)  Free From Fall Injury: Instruct family/caregiver on patient safety     Problem: Pain  Goal: Verbalizes/displays adequate comfort level or baseline comfort level  Outcome: Progressing     Problem: Nutrition Deficit:  Goal: Optimize nutritional status  Outcome: Progressing

## 2022-05-15 NOTE — PROGRESS NOTES
Progress Note    SUBJECTIVE:  FU related to quite a bit of nausea. No vomiting. Unable to keep much down appetite is poor. Potassium was corrected. OBJECTIVE:    Vitals:   TEMPERATURE:  Current - Temp: 97.8 °F (36.6 °C);  Max - Temp  Av.1 °F (36.7 °C)  Min: 97.8 °F (36.6 °C)  Max: 98.3 °F (36.8 °C)  RESPIRATIONS RANGE: Resp  Av  Min: 16  Max: 16  PULSE RANGE: Pulse  Av.8  Min: 70  Max: 100  BLOOD PRESSURE RANGE:  Systolic (77ZDT), DHH:647 , Min:97 , NCF:622   ; Diastolic (30AWX), JGJ:49, Min:65, Max:81    PULSE OXIMETRY RANGE: SpO2  Av.5 %  Min: 91 %  Max: 94 %  24HR INTAKE/OUTPUT:    Intake/Output Summary (Last 24 hours) at 5/15/2022 0755  Last data filed at 5/15/2022 0630  Gross per 24 hour   Intake 3957.25 ml   Output 1125 ml   Net 2832.25 ml     -----------------------------------------------------------------  Exam:  General: alert  HEENT: Supple neck & negative  Heart: Regular  Lungs: clear to auscultation bilaterally & no retractions  Abdomen: Normal & soft, No tenderness and BS normal  Extremities:  No edema   Neuro: NonFocal     -----------------------------------------------------------------  Diagnostic Data:  Lab Results   Component Value Date    WBC 9.4 2022    HGB 12.8 (L) 2022     2022       Lab Results   Component Value Date    BUN 21 2022    CREATININE 0.43 (L) 2022     (L) 2022    K 3.7 2022    CALCIUM 8.1 (L) 2022    CL 89 (L) 2022    CO2 37 (H) 2022    LABGLOM >60 2022       Lab Results   Component Value Date    WBCUA 0 TO 2 2022    RBCUA 0 TO 2 2022    EPITHUA 0 TO 2 2022    LEUKOCYTESUR NEGATIVE 2022    SPECGRAV 1.020 2022    GLUCOSEU NEGATIVE 2022    KETUA TRACE (A) 2022    PROTEINU TRACE (A) 2022    HGBUR NEGATIVE 2022    CASTUA NOT REPORTED 10/09/2021    CRYSTUA NOT REPORTED 10/09/2021    BACTERIA 1+ (A) 2022    YEAST NOT REPORTED 10/09/2021       Lab Results   Component Value Date    TROPONINT NOT REPORTED 11/07/2020    PROBNP 198 11/07/2020       No results found. ASSESSMENT:    Principal Problem:    Intractable vomiting with nausea  Active Problems:    Severe malnutrition (HCC)  Resolved Problems:    * No resolved hospital problems. *      Patient Active Problem List    Diagnosis Date Noted    Severe malnutrition ([address] 05/14/2022    Intractable vomiting with nausea 05/13/2022    Cholangiocarcinoma ([address] 11/16/2021    Aspiration pneumonia ([address] 10/19/2021    Aspiration pneumonia due to inhalation of vomitus ([address] 10/15/2021    Respiratory failure ([address] 10/15/2021    Abdominal pain 10/11/2021    Nausea and vomiting 10/11/2021    Esophageal varices without bleeding ([address] 08/07/2020    Intrahepatic bile duct dilation 08/07/2020    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[facility] [address] 08/07/2020    Elevated bilirubin 08/07/2020    Elevated CA 19-9 level 08/07/2020    Alcohol abuse 08/07/2020    Cholecystitis 08/06/2020    Gastroesophageal reflux disease without esophagitis 08/06/2020    Alcohol use 08/06/2020    Portal hypertension ([address] 08/06/2020    Positive FIT (fecal immunochemical test) 07/03/2020       PLAN:  · Antiemetics supportive care. Electrolyte correction.   · Critical Care Time: 0  · Dulcolax suppository for constipation hopefully that will help      Jonaatn Unger MD , MMARILY.

## 2022-05-15 NOTE — PROGRESS NOTES
Physical Therapy  Facility/Department: Critical access hospital AT THE [facility] MED SURG  Daily Treatment Note  NAME: Paolo Sheth  : 1960  MRN: [id number]    Date of Service: 5/15/2022    Discharge Recommendations:  Continue to assess pending progress,Home with assist PRN        Patient Diagnosis(es): The primary encounter diagnosis was Non-intractable vomiting with nausea, unspecified vomiting type. Diagnoses of Dehydration, Cachexia ([address] and Electrolyte imbalance were also pertinent to this visit. Assessment   Activity Tolerance: Patient tolerated treatment well     Plan    Plan  Plan: 2 times a day 7 days a week  Current Treatment Recommendations: Strengthening;ROM;Balance training;Gait training;Stair training;Functional mobility training;Transfer training; Endurance training;Home exercise program;Safety education & training;Patient/Caregiver education & training; Therapeutic activities; Neuromuscular re-education     Restrictions  Restrictions/Precautions  Restrictions/Precautions: General Precautions,Fall Risk     Subjective    Subjective  Subjective: Pt stated that he feels better and willing to walk. Pain: Pt reported 6/10 abdominal pain. Nursing aware of pain. Orientation  Overall Orientation Status: Within Functional Limits  Orientation Level: Oriented X4     Objective   Vitals     Bed Mobility Training  Bed Mobility Training: No  Transfer Training  Transfer Training: Yes  Sit to Stand: Stand-by assistance  Stand to Sit: Stand-by assistance  Gait Training  Gait Training: Yes  Gait  Overall Level of Assistance: Stand-by assistance;Contact-guard assistance  Interventions: Verbal cues (Cues for posture and safety with IV pole. Pt had 1 LOB but pt able to self-correct.)  Distance (ft): 250 Feet  Assistive Device: Other (comment) (Pt pushed IV pole.)           Safety Devices  Type of Devices: All fall risk precautions in place;Call light within reach; Left in chair;Nurse notified (No alarm upon entry.)       Goals  Short Term Goals  Time Frame for Short term goals: 20 days  Short term goal 1: Patient to complete all transfers mod. INd with no LOB to decrease fall risk. Short term goal 2: Patient to ambulate [id number] with no AD and SUP with no LOB or fatigue to improve mobility. Short term goal 3: Patient to tolerate 20-30 min of ther ex/act to improve functional strength. Short term goal 4: Patient to ascend/descend one step with no HR and SUP with no LOB to decrease fall risk.     Education  Patient Education  Education Given To: Patient  Education Provided: Role of Therapy;Plan of Care  Education Method: Verbal  Barriers to Learning: None  Education Outcome: Verbalized understanding    Therapy Time   Individual Concurrent Group Co-treatment   Time In 1347         Time Out 1412         Minutes [address] [place] [address]

## 2022-05-16 VITALS
SYSTOLIC BLOOD PRESSURE: 102 MMHG | BODY MASS INDEX: 19.99 KG/M2 | HEART RATE: 74 BPM | DIASTOLIC BLOOD PRESSURE: 87 MMHG | RESPIRATION RATE: 14 BRPM | OXYGEN SATURATION: 94 % | TEMPERATURE: 97.6 F | WEIGHT: 135 LBS | HEIGHT: 69 IN

## 2022-05-16 PROBLEM — C78.6 PERITONEAL METASTASES (HCC): Status: ACTIVE | Noted: 2022-05-16

## 2022-05-16 LAB
ALBUMIN SERPL-MCNC: 2.5 G/DL (ref 3.5–5.2)
ALBUMIN/GLOBULIN RATIO: 0.8 (ref 1–2.5)
ALP BLD-CCNC: 195 U/L (ref 40–129)
ALT SERPL-CCNC: 14 U/L (ref 5–41)
ANION GAP SERPL CALCULATED.3IONS-SCNC: 9 MMOL/L (ref 9–17)
AST SERPL-CCNC: 25 U/L
BILIRUB SERPL-MCNC: 1 MG/DL (ref 0.3–1.2)
BUN BLDV-MCNC: 15 MG/DL (ref 8–23)
BUN/CREAT BLD: 38 (ref 9–20)
CALCIUM SERPL-MCNC: 8 MG/DL (ref 8.6–10.4)
CHLORIDE BLD-SCNC: 92 MMOL/L (ref 98–107)
CO2: 32 MMOL/L (ref 20–31)
CREAT SERPL-MCNC: 0.4 MG/DL (ref 0.7–1.2)
GFR AFRICAN AMERICAN: >60 ML/MIN
GFR NON-AFRICAN AMERICAN: >60 ML/MIN
GFR SERPL CREATININE-BSD FRML MDRD: ABNORMAL ML/MIN/{1.73_M2}
GFR SERPL CREATININE-BSD FRML MDRD: ABNORMAL ML/MIN/{1.73_M2}
GLUCOSE BLD-MCNC: 72 MG/DL (ref 70–99)
POTASSIUM SERPL-SCNC: 4.2 MMOL/L (ref 3.7–5.3)
SODIUM BLD-SCNC: 133 MMOL/L (ref 135–144)
TOTAL PROTEIN: 5.5 G/DL (ref 6.4–8.3)

## 2022-05-16 PROCEDURE — 6360000002 HC RX W HCPCS: Performed by: NURSE PRACTITIONER

## 2022-05-16 PROCEDURE — 80053 COMPREHEN METABOLIC PANEL: CPT

## 2022-05-16 PROCEDURE — 6370000000 HC RX 637 (ALT 250 FOR IP): Performed by: INTERNAL MEDICINE

## 2022-05-16 PROCEDURE — 2580000003 HC RX 258: Performed by: INTERNAL MEDICINE

## 2022-05-16 PROCEDURE — 6360000002 HC RX W HCPCS: Performed by: INTERNAL MEDICINE

## 2022-05-16 PROCEDURE — 94761 N-INVAS EAR/PLS OXIMETRY MLT: CPT

## 2022-05-16 PROCEDURE — 36415 COLL VENOUS BLD VENIPUNCTURE: CPT

## 2022-05-16 RX ORDER — POLYETHYLENE GLYCOL 3350 17 G/17G
17 POWDER, FOR SOLUTION ORAL DAILY PRN
Qty: 527 G | Refills: 1 | Status: SHIPPED | OUTPATIENT
Start: 2022-05-16 | End: 2022-06-15

## 2022-05-16 RX ORDER — PANTOPRAZOLE SODIUM 40 MG/1
40 TABLET, DELAYED RELEASE ORAL
Qty: 30 TABLET | Refills: 3 | Status: ON HOLD | OUTPATIENT
Start: 2022-05-17 | End: 2022-05-19 | Stop reason: SDUPTHER

## 2022-05-16 RX ORDER — HEPARIN SODIUM,PORCINE 10 UNIT/ML
3 VIAL (ML) INTRAVENOUS ONCE
Status: DISCONTINUED | OUTPATIENT
Start: 2022-05-16 | End: 2022-05-16

## 2022-05-16 RX ORDER — SCOLOPAMINE TRANSDERMAL SYSTEM 1 MG/1
1 PATCH, EXTENDED RELEASE TRANSDERMAL
Qty: 10 PATCH | Refills: 2 | Status: ON HOLD | OUTPATIENT
Start: 2022-05-16 | End: 2022-05-19 | Stop reason: SDUPTHER

## 2022-05-16 RX ORDER — BISACODYL 10 MG
10 SUPPOSITORY, RECTAL RECTAL DAILY PRN
Qty: 5 SUPPOSITORY | Refills: 2 | Status: SHIPPED | OUTPATIENT
Start: 2022-05-16 | End: 2022-06-15

## 2022-05-16 RX ORDER — HEPARIN SODIUM (PORCINE) LOCK FLUSH IV SOLN 100 UNIT/ML 100 UNIT/ML
500 SOLUTION INTRAVENOUS ONCE
Status: COMPLETED | OUTPATIENT
Start: 2022-05-16 | End: 2022-05-16

## 2022-05-16 RX ADMIN — ONDANSETRON 4 MG: 4 TABLET, ORALLY DISINTEGRATING ORAL at 07:07

## 2022-05-16 RX ADMIN — SODIUM CHLORIDE, PRESERVATIVE FREE 10 ML: 5 INJECTION INTRAVENOUS at 08:12

## 2022-05-16 RX ADMIN — ENOXAPARIN SODIUM 40 MG: 100 INJECTION SUBCUTANEOUS at 08:11

## 2022-05-16 RX ADMIN — HYDROMORPHONE HYDROCHLORIDE 2 MG: 2 TABLET ORAL at 08:11

## 2022-05-16 RX ADMIN — DEXAMETHASONE 2 MG: 0.5 TABLET ORAL at 08:11

## 2022-05-16 RX ADMIN — HEPARIN 500 UNITS: 100 SYRINGE at 12:01

## 2022-05-16 RX ADMIN — HYDROMORPHONE HYDROCHLORIDE 2 MG: 2 INJECTION, SOLUTION INTRAMUSCULAR; INTRAVENOUS; SUBCUTANEOUS at 02:35

## 2022-05-16 RX ADMIN — PANTOPRAZOLE SODIUM 40 MG: 40 TABLET, DELAYED RELEASE ORAL at 07:07

## 2022-05-16 ASSESSMENT — PAIN SCALES - GENERAL
PAINLEVEL_OUTOF10: 8
PAINLEVEL_OUTOF10: 7
PAINLEVEL_OUTOF10: 6

## 2022-05-16 ASSESSMENT — PAIN DESCRIPTION - LOCATION
LOCATION: ABDOMEN

## 2022-05-16 ASSESSMENT — PAIN DESCRIPTION - ORIENTATION: ORIENTATION: RIGHT;LOWER;UPPER

## 2022-05-16 ASSESSMENT — PAIN DESCRIPTION - DESCRIPTORS: DESCRIPTORS: ACHING

## 2022-05-16 NOTE — CONSULTS
Palliative Care Inpatient Evaluation    NAME:  Alex Palm  MEDICAL RECORD NUMBER:  [id number]  AGE: 63 y.o. GENDER: male  : 1960  TODAY'S DATE:  2022    Reasons for Consultation:    Provision of information regarding PC and/or hospice philosophies  Complex, time-intensive communication and interdisciplinary psychosocial support  Clarification of goals of care and/or assistance with difficult decision-making  Guidance in regards to resources and transition(s)    Code Status:     History of Present Illness     The patient is a 63 y.o. Non- / non  male who presents with Emesis (Pt has had nausea/emesis x 3-4 weeks and has cancer of the bile duct. )    Referred to Palliative Care by   [x] Physician   [] Nursing  [] Family Request   [] Other:       He was admitted to the med/surg service for Cachexia [facility]) [R64]  Dehydration [E86.0]  Electrolyte imbalance [E87.8]  Non-intractable vomiting with nausea, unspecified vomiting type [R11.2]  Intractable vomiting with nausea [R11.2]. His hospital course has been associated with Intractable vomiting with nausea. The patient has a complicated medical history and has been hospitalized since 2022  3:36 PM.    Active Hospital Problems    Diagnosis Date Noted    Severe malnutrition ([facility] [E43] 2022     Priority: Medium     Class: Present on Admission    Intractable vomiting with nausea [R11.2] 2022     Priority: Medium       Data        Code Status: Full Code     ADVANCED CARE PLANNING:  Patient has capacity for medical decisions: yes  Health Care Power of : no  Living Will: no     Personal, Social, and Family History  Marital Status:   Living situation:with family:  spouse  Rena/Spiritual History:   Denies needs at this time  Psychological Distress: mild  Does patient understand diagnosis/treatment?  yes  Does family/caregiver understand diagnosis/treatment? not available at this time    Assessment        Palliative Performance Scale:    ___100% Full ambulation; normal activity and work; no evidence of disease; able to do own self care; normal intake; fully conscious  ___90% Full ambulation; normal activity and work; some evidence of disease; able to do own self care; normal intake; fully conscious  _x__80% Full ambulation; normal activity with effort; some evidence of disease; able to do own self care; normal or reduced intake; fully conscious  ___70% Ambulation reduced; unable to perform normal job/work; significant disease; able to do own self care; normal or reduced intake; fully conscious  ___60%  Ambulation reduced; cannot do hobbies/housework; significant disease; occasional assist; intake normal or reduced; fully conscious/some confusion  ___50%  Mainly sit/lie; can't do any work; extensive disease; considerable assist; intake normal or reduced; fully conscious/some confusion  ___40%  Mainly in bed; extensive disease; mainly assist; intake normal or reduced; fully conscious/ some confusion   ___30%  Bed bound; extensive disease; total care; intake reduced; fully conscious/some confusion  ___20%  Bed bound; extensive disease; total care; intake minimal; drowsy/coma  ___10%  Bed bound; extensive disease; total care; mouth care only; drowsy/coma  ___0       Death           Readmission Risk Score: 15.8 ( )        Plan        Palliative Interaction: Order received for palliative care evaluation. I introduce myself to patient and tell him my role in his care. Pt states that he had surgery to remove his cancer last October and has been on different types of chemo since then. The last chemo he was on \"really kicked my butt\", he states that \"he was down for 20 days. He tells me he has lost over 130lbs since this all started and is having trouble keeping food down. I discuss with him palliative care, and he says \"I know all about it, I have [facility] already\".   He states that he has had it for a couple of weeks, and that he is happy with their care. He has an appointment with his oncologist on Wednesday of this week to determine his course of care. We discuss Living Will, [address] and code status. He says that his wife Priscilla Lawrence it all laid out on the table at home\". We discuss if he has thought about any of the documents and what it means, he states that he knows that he needs to think about it, but he is not there yet. I tell him it is OK and offer support to fill out the documents at any time he would want to. I try to discuss further care, but patient deflects and changes subject, he again tells me he has an appointment Wednesday and he will go from there. I ask if he has any other needs or questions at this time, he denies any. Education/support to patient  Continue with current plan of care  Code status clarified: Full Code    Principle Problem/Diagnosis:  Cachexia ([address] [R64]  Dehydration [E86.0]  Electrolyte imbalance [E87.8]  Non-intractable vomiting with nausea, unspecified vomiting type [R11.2]  Intractable vomiting with nausea [R11.2]    Goals of care evaluation:  The patient goals of care are live longer, improve or maintain function/quality of life and preserve independence/autonomy/control   Goals of care discussed with:    [x] Patient independently    [] Patient and Family    [] Family or Healthcare DPOA independently    [] Unable to discuss with patient, family/DPOA not present    Code Status  Full Code    Palliative Care will continue to follow Mr. Arnold Cote care as needed. Thank you for allowing Palliative Care to participate in the care of Mr. Holly Felix .     Electronically signed by   Riley Mello RN  Palliative Care Team  on 5/16/2022 at 10:45 AM    Palliative care office: [phone]

## 2022-05-16 NOTE — PROGRESS NOTES
Patient discharged home prior to [facility] visit.     [address], [facility]  5/16/2022

## 2022-05-16 NOTE — PROGRESS NOTES
Vitals and assessment are complete at this time. Writer reeducated patient on his diet and medications and encouraged patient to ask questions about the medications and therapies. Patient denies questions. He has been up walking in the halls throughout the night. He has been denying nausea but pain remains in the right upper and lower abdomen. Call light is within reach and bed alarm set. Patient denies needs at this time. Will continue to monitor and assess.

## 2022-05-16 NOTE — PROGRESS NOTES
Physician Progress Note      Dahiana Tate  [facility] #:                  [id number]  :                       1960  ADMIT DATE:       2022 3:36 PM  DISCH DATE:  RESPONDING  PROVIDER #:        Ewa Vivas MD          QUERY TEXT:    Pt admitted with intractable vomiting resulting in hypokalemia. Noted   documentation of severe malnutrition on  Dietary Progress Note. If   possible, please document in progress notes and discharge summary:    The medical record reflects the following:  Risk Factors: 63 y.o. male with malignancy to bile duct, presents with 3-4   week history of intractable vomiting. Clinical Indicators: In the setting of above risk factors, ED Provider Note   :  history of duct cancer presenting to the emergency department for   evaluation of a 3 to 4-week history of nausea vomiting. Patient states his   symptoms have progressively worsened and is barely able to keep down any food   or fluid at home. C/o generalized weakness. Thin and cachectic appearing. Impression: Dehydration, Cachexia, Electrolyte imbalance. H&P : This   patient requires inpatient admission because of hypokalemia and electrolyte   abnormality due to intractable vomiting related to mal  Treatment: 2L bolus with IVFs at 125 ml/ hr. Potassium replacement. Protein   with each meal when able to tolerate. Currently on Clear liquid diet, ordered   Ensure Elive when pt is advanced past Clears. Options provided:  -- Severe malnutrition with dehydration confirmed present on admission  -- Severe malnutrition without dehydration confirmed present on admission  -- Other - I will add my own diagnosis  -- Disagree - Not applicable / Not valid  -- Disagree - Clinically unable to determine / Unknown  -- Refer to Clinical Documentation Reviewer    PROVIDER RESPONSE TEXT:    The diagnosis of severe malnutrition with dehydration was confirmed as present   on admission.     Query created by: Ronit Headley Jeri Barker on 5/14/2022 1:19 PM      Electronically signed by:  Colin Clements MD 5/16/2022 6:51 AM

## 2022-05-16 NOTE — PROGRESS NOTES
Discharge instructions provided. All questions answered. IV was removed. Port de-accessed with heparin. Will take out via wheelchair when private transportation arrives.

## 2022-05-16 NOTE — DISCHARGE SUMMARY
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 *      Hospital Course:   Octavio Hernandez is a 63 y.o. male admitted with intractable nausea and vomiting. He presented to the emergency room with complaints of nausea and vomiting. Onset 3 to 4 weeks. Patient stated his symptoms have been getting worse and is unable to keep food or liquids down. 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 Patient currently has Zofran, Phenergan and Reglan. He stated he is using his medications as prescribed. He denied recent illness including fever chills. He denied any abdominal pain. He denied chest pain or shortness of breath or palpitations. He did complain of generalized weakness due to poor oral intake. During patient's hospitalization he was placed on IV fluids. Patient's nausea and vomiting were controlled and patient was given suppository for constipation. Patient did have large soft bowel movement and felt better. Patient was started on scopolamine patch and will continue on discharge. Patient is tolerating diet well he is taking fluids and tolerating activity well as well. He will be discharged home today. Consultants:  none    Procedures: none    Complications: none    Discharge Condition: fair    Exam:  GEN:    Awake, alert and oriented x3. EYES:   EOMI, pupils equal   NECK: Supple. No lymphadenopathy.   No carotid bruit  CVS:     regular rate and rhythm, no audible murmur  PULM:  CTA, no wheezes, rales or rhonchi, no acute respiratory distress  ABD:     Bowels sounds normal.  Abdomen is soft. No distention. no tenderness to palpation. EXT:     no edema bilaterally . No calf tenderness. NEURO: Moves all extremities. Motor and sensory are grossly intact  SKIN:    No rashes. No skin lesions. Significant Diagnostic Studies:   Lab Results   Component Value Date    WBC 9.4 05/14/2022    HGB 12.8 (L) 05/14/2022     05/14/2022       Lab Results   Component Value Date    BUN 15 05/16/2022    CREATININE 0.40 (L) 05/16/2022     (L) 05/16/2022    K 4.2 05/16/2022    CALCIUM 8.0 (L) 05/16/2022    CL 92 (L) 05/16/2022    CO2 32 (H) 05/16/2022    LABGLOM >60 05/16/2022       Lab Results   Component Value Date    WBCUA 0 TO 2 05/13/2022    RBCUA 0 TO 2 05/13/2022    EPITHUA 0 TO 2 05/13/2022    LEUKOCYTESUR NEGATIVE 05/13/2022    SPECGRAV 1.020 05/13/2022    GLUCOSEU NEGATIVE 05/13/2022    KETUA TRACE (A) 05/13/2022    PROTEINU TRACE (A) 05/13/2022    HGBUR NEGATIVE 05/13/2022    CASTUA NOT REPORTED 10/09/2021    CRYSTUA NOT REPORTED 10/09/2021    BACTERIA 1+ (A) 05/13/2022    YEAST NOT REPORTED 10/09/2021       No results found.     Assessment and Plan:  Patient Active Problem List    Diagnosis Date Noted    Peritoneal metastases ([address] 05/16/2022    Severe malnutrition ([address] 05/14/2022    Intractable vomiting with nausea 05/13/2022    Intrahepatic cholangiocarcinoma ([address] 11/16/2021    Aspiration pneumonia ([address] 10/19/2021    Aspiration pneumonia due to inhalation of vomitus ([address] 10/15/2021    Respiratory failure ([address] 10/15/2021    Abdominal pain 10/11/2021    Nausea and vomiting 10/11/2021    Esophageal varices without bleeding ([address] 08/07/2020    Intrahepatic bile duct dilation 08/07/2020    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[address] 08/07/2020    Elevated bilirubin 08/07/2020    Elevated CA 19-9 level 08/07/2020    Alcohol abuse 08/07/2020    Cholecystitis 08/06/2020    Gastroesophageal reflux disease without esophagitis 08/06/2020    Alcohol use 08/06/2020    Portal hypertension ([address] 08/06/2020    Positive FIT (fecal immunochemical test) 07/03/2020        Discharge Medications:         Medication List      START taking these medications    bisacodyl 10 MG suppository  Commonly known as: DULCOLAX  Place 1 suppository rectally daily as needed for Constipation     pantoprazole 40 MG tablet  Commonly known as: PROTONIX  Take 1 tablet by mouth every morning (before breakfast)  Start taking on: May 17, 2022  Replaces: omeprazole 20 MG delayed release capsule     polyethylene glycol 17 g packet  Commonly known as: GLYCOLAX  Take 17 g by mouth daily as needed for Constipation     scopolamine transdermal patch  Commonly known as: TRANSDERM-SCOP  Place 1 patch onto the skin every 72 hours 1.5 mg patch delivers 1 mg over 3 days. Apply patch to hairless area behind the ear.         CONTINUE taking these medications    Boost Liqd  Take 1 Can by mouth daily     dexamethasone 1 MG tablet  Commonly known as: DECADRON     HYDROmorphone 2 MG tablet  Commonly known as: DILAUDID     lidocaine-prilocaine 2.5-2.5 % cream  Commonly known as: EMLA     metoclopramide 10 MG tablet  Commonly known as: Reglan  Take 1 tablet by mouth 4 times daily as needed (Nausea/vomiting)     nicotine 14 MG/24HR  Commonly known as: NICODERM CQ     ondansetron 4 MG disintegrating tablet  Commonly known as: Zofran ODT  Take 1 tablet by mouth every 8 hours as needed for Nausea or Vomiting     PROBIOTIC-10 PO     promethazine 25 MG tablet  Commonly known as: PHENERGAN     therapeutic multivitamin-minerals tablet        STOP taking these medications    famotidine 40 MG tablet  Commonly known as: PEPCID     omeprazole 20 MG delayed release capsule  Commonly known as: PRILOSEC  Replaced by: pantoprazole 40 MG tablet           Where to Get Your Medications      These medications were sent to [facility] [id number] - [address] [place] [address]    Phone: [phone]   · bisacodyl 10 MG suppository  · pantoprazole 40 MG tablet  · polyethylene glycol 17 g packet  · scopolamine transdermal patch         Patient Instructions:    Activity: activity as tolerated  Diet: clear liquids, advance as tolerated  Wound Care: none needed  Other: None    Disposition:   Discharge to Home    Follow up:  Patient will be followed by JUAN CARLOS Padron CNP in 1-2 weeks    CORE MEASURES on Discharge (if applicable)  ACE/ARB in CHF: NA  Statin in MI: NA  ASA in MI: NA  Statin in CVA: NA  Antiplatelet in CVA: NA    Total time spent on discharge services: 40 minutes    Including the following activities:  Evaluation and Management of patient  Discussion with patient and/or surrogate about current care plan  Coordination with Case Management and/or   Coordination of care with Consultants (if applicable)   Coordination of care with Receiving Facility Physician (if applicable)  Completion of DME forms (if applicable)  Preparation of Discharge Summary  Preparation of Medication Reconciliation  Preparation of Discharge Prescriptions    Signed:  JUAN CARLOS Whipple CNP, JUAN CARLOS, NP-C  5/16/2022, 11:13 AM

## 2022-05-16 NOTE — PROGRESS NOTES
[facility]  Inpatient/Observation/Outpatient Rehabilitation    Date: 2022  Patient Name: Luis Medina       [x] Inpatient Acute/Observation       []  Outpatient  : 1960       Attempted to see pt this AM but pt refused. Stated that he just wanted to rest currently and didn't feel up to any walking or exercises.       Sammy Barr Date: 2022

## 2022-05-16 NOTE — PROGRESS NOTES
Progress Note    SUBJECTIVE:    Patient seen for f/u of Intractable vomiting with nausea. He sitting up in chair in no distress. Tolerating liquids. Had BM yesterday. No nausea. Educated on meds     ROS:   Constitutional: negative  for fevers, and negative for chills. Respiratory: negative for shortness of breath, negative for cough, and negative for wheezing  Cardiovascular: negative for chest pain, and negative for palpitations  Gastrointestinal: negative for abdominal pain, negative for nausea,negative for vomiting, negative for diarrhea, and negative for constipation     All other systems were reviewed with the patient and are negative unless otherwise stated in HPI      OBJECTIVE:      Vitals:   Vitals:    05/16/22 0701   BP: 102/87   Pulse: 74   Resp: 14   Temp: 97.6 °F (36.4 °C)   SpO2: 94%     Weight: 135 lb (61.2 kg)   Height: 5' 9\" (175.3 cm)     Weight  Wt Readings from Last 3 Encounters:   05/16/22 135 lb (61.2 kg)   05/04/22 148 lb (67.1 kg)   04/17/22 160 lb (72.6 kg)     Body mass index is 19.94 kg/m². 24HR INTAKE/OUTPUT:      Intake/Output Summary (Last 24 hours) at 5/16/2022 1107  Last data filed at 5/15/2022 1703  Gross per 24 hour   Intake 1804.32 ml   Output --   Net 1804.32 ml     -----------------------------------------------------------------  Exam:    GEN:    Awake, alert and oriented x3. EYES:  EOMI, pupils equal   NECK: Supple. No lymphadenopathy. No carotid bruit  CVS:    regular rate and rhythm, no audible murmur  PULM:  CTA, no wheezes, rales or rhonchi, no acute respiratory distress  ABD:    Bowels sounds normal.  Abdomen is soft. No distention. no tenderness to palpation. EXT:   no edema bilaterally . No calf tenderness. NEURO: Moves all extremities. Motor and sensory are grossly intact  SKIN:  No rashes.   No skin lesions.    -----------------------------------------------------------------    Diagnostic Data:      Complete Blood Count:   Recent Labs 05/13/22 1656 05/14/22 0520   WBC 12.8* 9.4   RBC 4.10* 3.75*   HGB 14.3 12.8*   HCT 41.9 38.2*   .2 101.9   MCH 34.9* 34.1*   MCHC 34.1 33.5   RDW 13.1 13.2    213   MPV 8.9 9.0        Last 3 Blood Glucose:   Recent Labs     05/13/22  1656 05/14/22  0520 05/15/22  0745 05/16/22  0545   GLUCOSE 96 81 70 72        Comprehensive Metabolic Profile:   Recent Labs     05/14/22 0520 05/14/22 0520 05/14/22  1455 05/15/22  0745 05/16/22  0545   *  --   --  132* 133*   K 2.7*   < > 3.7 3.6* 4.2   CL 89*  --   --  91* 92*   CO2 37*  --   --  32* 32*   BUN 21  --   --  18 15   CREATININE 0.43*  --   --  0.49* 0.40*   GLUCOSE 81  --   --  70 72   CALCIUM 8.1*  --   --  8.3* 8.0*   PROT 5.9*  --   --  5.8* 5.5*   LABALBU 3.0*  --   --  2.5* 2.5*   BILITOT 0.64  --   --  1.42* 1.00   ALKPHOS 143*  --   --  183* 195*   AST 16  --   --  24 25   ALT 9  --   --  14 14    < > = values in this interval not displayed. Urinalysis:   Lab Results   Component Value Date    NITRU NEGATIVE 05/13/2022    COLORU Yellow 05/13/2022    PHUR 7.0 05/13/2022    WBCUA 0 TO 2 05/13/2022    WBCUA 0-5 04/17/2022    RBCUA 0 TO 2 05/13/2022    RBCUA None Seen 04/17/2022    MUCUS 2+ 05/13/2022    TRICHOMONAS NOT REPORTED 10/09/2021    YEAST NOT REPORTED 10/09/2021    BACTERIA 1+ 05/13/2022    CLARITYU Clear 04/17/2022    SPECGRAV 1.020 05/13/2022    LEUKOCYTESUR NEGATIVE 05/13/2022    UROBILINOGEN Normal 05/13/2022    BILIRUBINUR SMALL 05/13/2022    BILIRUBINUR Negative 04/17/2022    BLOODU Negative 04/17/2022    GLUCOSEU NEGATIVE 05/13/2022    KETUA TRACE 05/13/2022    AMORPHOUS NOT REPORTED 10/09/2021       HgBA1c:  No results found for: LABA1C    Lactic Acid:   Lab Results   Component Value Date    LACTA 1.2 10/09/2021    LACTA 0.6 11/07/2020        Troponin: No results for input(s): TROPONINI in the last 72 hours. CRP:  No results for input(s): CRP in the last 72 hours.       Radiology/Imaging:  XR ABDOMEN (KUB) (SINGLE

## 2022-05-16 NOTE — ACP (ADVANCE CARE PLANNING)
Advance Care Planning     Advance Care Planning Activator (Inpatient)  Conversation Note      Date of ACP Conversation: 5/16/2022     Conversation Conducted with: Patient with Decision Making Capacity    ACP Activator: Court Crum RN        Health Care Decision Maker:     Current Designated Health Care Decision Maker:     Primary Decision Maker (Active): mekhi calabrese - Spouse - [phone]    Care Preferences    Ventilation: \"If you were in your present state of health and suddenly became very ill and were unable to breathe on your own, what would your preference be about the use of a ventilator (breathing machine) if it were available to you? \"      Would the patient desire the use of ventilator (breathing machine)?: no    \"If your health worsens and it becomes clear that your chance of recovery is unlikely, what would your preference be about the use of a ventilator (breathing machine) if it were available to you? \"     Would the patient desire the use of ventilator (breathing machine)?: Yes      Resuscitation  \"CPR works best to restart the heart when there is a sudden event, like a heart attack, in someone who is otherwise healthy. Unfortunately, CPR does not typically restart the heart for people who have serious health conditions or who are very sick. \"    \"In the event your heart stopped as a result of an underlying serious health condition, would you want attempts to be made to restart your heart (answer \"yes\" for attempt to resuscitate) or would you prefer a natural death (answer \"no\" for do not attempt to resuscitate)? \" no       [x] Yes   [] No   Educated Patient / Rafal Coleman regarding differences between Advance Directives and portable DNR orders.     Length of ACP Conversation in minutes:  10  Conversation Outcomes:  [x] ACP discussion completed  [] Existing advance directive reviewed with patient; no changes to patient's previously recorded wishes  [] New Advance Directive completed  [] Portable Do Not Rescitate prepared for Provider review and signature  [] POLST/POST/MOLST/MOST prepared for Provider review and signature      Follow-up plan:    [] Schedule follow-up conversation to continue planning  [x] Referred individual to Provider for additional questions/concerns   [] Advised patient/agent/surrogate to review completed ACP document and update if needed with changes in condition, patient preferences or care setting    [] This note routed to one or more involved healthcare providers     Marcus Messina RN  [facility] Nurse Coordinator  5/16/2022 10:43 AM

## 2022-05-16 NOTE — PROGRESS NOTES
Nutrition Assessment     Type and Reason for Visit: Reassess    Nutrition Recommendations/Plan:   1. Diet instruction review provided for pt before discharge     Malnutrition Assessment:  Malnutrition Status: Severe malnutrition    Nutrition Assessment:  Pt is being followed up on today for po intake. PO intake has improved. This morning was first meal and pt had a scrambled egg, bread, and strawberry ONS with good tolerance. Today at lunch he is eating a chicken dinner and more substantial, however, continuing with caution. Pt is being discharged today. Pt is at moderate nutrition risk. Estimated Daily Nutrient Needs:  Energy (kcal):  9211-9040 (20-25) Weight Used for Energy Requirements: Current     Protein (g):   (1.2-1.4) Weight Used for Protein Requirements: Ideal        Fluid (ml/day):  1800 Method Used for Fluid Requirements: 1 ml/kcal    Nutrition Related Findings:   active bowel sounds, undernourished appearance      Current Nutrition Therapies:    ADULT DIET;  Regular    Anthropometric Measures:  · Height: 5' 9\" (175.3 cm)  · Current Body Wt: 136 lb 14.5 oz (62.1 kg)   · BMI: 20.2    Nutrition Diagnosis:   Severe malnutrition related to impaired nutrient utilization as evidenced by intake 0-25%,vomiting,nausea,poor intake prior to admission,constipation,weight loss,severe loss of subcutaneous fat,severe muscle loss (hypoactive bowel sounds)     Nutrition Interventions:   Food and/or Nutrient Delivery: Continue Current Diet,Continue Oral Nutrition Supplement  Nutrition Education/Counseling: Education completed  Coordination of Nutrition Care: No recommendation at this time  Plan of Care discussed with: Patient    Goals:  Previous Goal Met: Progressing toward Goal(s)  Goals: PO intake 75% or greater,within 2 days       Nutrition Monitoring and Evaluation:   Behavioral-Environmental Outcomes: None Identified  Food/Nutrient Intake Outcomes: Food and Nutrient Intake,Supplement Intake  Physical Signs/Symptoms Outcomes: Biochemical Data,Nausea or Vomiting,Weight    Discharge Planning:    Continue Oral Nutrition Supplement,Continue current diet     Nghia Sharma RD, LD  Contact: [phone]

## 2022-05-16 NOTE — PROGRESS NOTES
Patient sitting up in the chair at this time. C/o 6/10 pain and stated it wasn't too bad, declines any intervention at this time. VS and assessment completed. Patient stated he is feeling improved. He has been able to keep his clear liquids down. Requested to take the oral zofran before breakfast, stated he has been doing this for a while d/t his chemo. Denies any other needs.

## 2022-05-16 NOTE — PLAN OF CARE
Problem: Discharge Planning  Goal: Discharge to home or other facility with appropriate resources  Outcome: Completed     Problem: Discharge Planning  Goal: Discharge to home or other facility with appropriate resources  Outcome: Completed     Problem: Safety - Adult  Goal: Free from fall injury  Outcome: Completed     Problem: Pain  Goal: Verbalizes/displays adequate comfort level or baseline comfort level  Outcome: Completed     Problem: Nutrition Deficit:  Goal: Optimize nutritional status  Outcome: Completed

## 2022-05-18 ENCOUNTER — APPOINTMENT (OUTPATIENT)
Dept: ULTRASOUND IMAGING | Age: 62
DRG: 435 | End: 2022-05-18
Payer: COMMERCIAL

## 2022-05-18 ENCOUNTER — HOSPITAL ENCOUNTER (INPATIENT)
Age: 62
LOS: 1 days | Discharge: HOME | DRG: 435 | End: 2022-05-19
Attending: EMERGENCY MEDICINE | Admitting: INTERNAL MEDICINE
Payer: COMMERCIAL

## 2022-05-18 ENCOUNTER — APPOINTMENT (OUTPATIENT)
Dept: CT IMAGING | Age: 62
DRG: 435 | End: 2022-05-18
Payer: COMMERCIAL

## 2022-05-18 DIAGNOSIS — Z51.5 HOSPICE CARE: ICD-10-CM

## 2022-05-18 DIAGNOSIS — R11.2 INTRACTABLE VOMITING WITH NAUSEA, UNSPECIFIED VOMITING TYPE: ICD-10-CM

## 2022-05-18 DIAGNOSIS — C78.6 PERITONEAL METASTASES (HCC): Chronic | ICD-10-CM

## 2022-05-18 DIAGNOSIS — C24.0 KLATSKIN'S TUMOR (HCC): ICD-10-CM

## 2022-05-18 DIAGNOSIS — E87.6 HYPOKALEMIA: ICD-10-CM

## 2022-05-18 DIAGNOSIS — E86.0 DEHYDRATION: ICD-10-CM

## 2022-05-18 DIAGNOSIS — C22.1 CHOLANGIOCARCINOMA (HCC): Primary | ICD-10-CM

## 2022-05-18 PROBLEM — J69.0 ASPIRATION PNEUMONIA DUE TO INHALATION OF VOMITUS (HCC): Status: RESOLVED | Noted: 2021-10-15 | Resolved: 2022-05-18

## 2022-05-18 PROBLEM — K81.9 CHOLECYSTITIS: Status: RESOLVED | Noted: 2020-08-06 | Resolved: 2022-05-18

## 2022-05-18 PROBLEM — J96.90 RESPIRATORY FAILURE (HCC): Status: RESOLVED | Noted: 2021-10-15 | Resolved: 2022-05-18

## 2022-05-18 PROBLEM — J69.0 ASPIRATION PNEUMONIA (HCC): Status: RESOLVED | Noted: 2021-10-19 | Resolved: 2022-05-18

## 2022-05-18 LAB
ABSOLUTE EOS #: <0.03 K/UL (ref 0–0.44)
ABSOLUTE IMMATURE GRANULOCYTE: 0.03 K/UL (ref 0–0.3)
ABSOLUTE LYMPH #: 1.14 K/UL (ref 1.1–3.7)
ABSOLUTE MONO #: 0.61 K/UL (ref 0.1–1.2)
ALBUMIN SERPL-MCNC: 3.2 G/DL (ref 3.5–5.2)
ALBUMIN/GLOBULIN RATIO: 1 (ref 1–2.5)
ALP BLD-CCNC: 246 U/L (ref 40–129)
ALT SERPL-CCNC: 17 U/L (ref 5–41)
ANION GAP SERPL CALCULATED.3IONS-SCNC: 10 MMOL/L (ref 9–17)
AST SERPL-CCNC: 26 U/L
BASOPHILS # BLD: 0 % (ref 0–2)
BASOPHILS ABSOLUTE: <0.03 K/UL (ref 0–0.2)
BILIRUB SERPL-MCNC: 1.24 MG/DL (ref 0.3–1.2)
BUN BLDV-MCNC: 15 MG/DL (ref 8–23)
BUN/CREAT BLD: 34 (ref 9–20)
CALCIUM SERPL-MCNC: 8.7 MG/DL (ref 8.6–10.4)
CHLORIDE BLD-SCNC: 93 MMOL/L (ref 98–107)
CO2: 33 MMOL/L (ref 20–31)
CREAT SERPL-MCNC: 0.44 MG/DL (ref 0.7–1.2)
EKG ATRIAL RATE: 80 BPM
EKG P AXIS: 55 DEGREES
EKG P-R INTERVAL: 152 MS
EKG Q-T INTERVAL: 392 MS
EKG QRS DURATION: 96 MS
EKG QTC CALCULATION (BAZETT): 452 MS
EKG R AXIS: 74 DEGREES
EKG T AXIS: 33 DEGREES
EKG VENTRICULAR RATE: 80 BPM
EOSINOPHILS RELATIVE PERCENT: 0 % (ref 1–4)
GFR AFRICAN AMERICAN: >60 ML/MIN
GFR NON-AFRICAN AMERICAN: >60 ML/MIN
GFR SERPL CREATININE-BSD FRML MDRD: ABNORMAL ML/MIN/{1.73_M2}
GFR SERPL CREATININE-BSD FRML MDRD: ABNORMAL ML/MIN/{1.73_M2}
GLUCOSE BLD-MCNC: 95 MG/DL (ref 70–99)
HCT VFR BLD CALC: 39.9 % (ref 40.7–50.3)
HEMOGLOBIN: 13.5 G/DL (ref 13–17)
IMMATURE GRANULOCYTES: 0 %
INR BLD: 1.1
LIPASE: 12 U/L (ref 13–60)
LYMPHOCYTES # BLD: 17 % (ref 24–43)
MAGNESIUM: 2.2 MG/DL (ref 1.6–2.6)
MCH RBC QN AUTO: 34.4 PG (ref 25.2–33.5)
MCHC RBC AUTO-ENTMCNC: 33.8 G/DL (ref 28.4–34.8)
MCV RBC AUTO: 101.5 FL (ref 82.6–102.9)
MONOCYTES # BLD: 9 % (ref 3–12)
NRBC AUTOMATED: 0 PER 100 WBC
PDW BLD-RTO: 13.2 % (ref 11.8–14.4)
PLATELET # BLD: 202 K/UL (ref 138–453)
PMV BLD AUTO: 9.3 FL (ref 8.1–13.5)
POTASSIUM SERPL-SCNC: 3 MMOL/L (ref 3.7–5.3)
PROTHROMBIN TIME: 14.4 SEC (ref 11.5–14.2)
RBC # BLD: 3.93 M/UL (ref 4.21–5.77)
SEG NEUTROPHILS: 74 % (ref 36–65)
SEGMENTED NEUTROPHILS ABSOLUTE COUNT: 5.1 K/UL (ref 1.5–8.1)
SODIUM BLD-SCNC: 136 MMOL/L (ref 135–144)
TOTAL PROTEIN: 6.5 G/DL (ref 6.4–8.3)
WBC # BLD: 6.9 K/UL (ref 3.5–11.3)

## 2022-05-18 PROCEDURE — 6370000000 HC RX 637 (ALT 250 FOR IP): Performed by: INTERNAL MEDICINE

## 2022-05-18 PROCEDURE — 6360000002 HC RX W HCPCS: Performed by: EMERGENCY MEDICINE

## 2022-05-18 PROCEDURE — 6370000000 HC RX 637 (ALT 250 FOR IP): Performed by: EMERGENCY MEDICINE

## 2022-05-18 PROCEDURE — 83690 ASSAY OF LIPASE: CPT

## 2022-05-18 PROCEDURE — 96374 THER/PROPH/DIAG INJ IV PUSH: CPT

## 2022-05-18 PROCEDURE — C9113 INJ PANTOPRAZOLE SODIUM, VIA: HCPCS | Performed by: INTERNAL MEDICINE

## 2022-05-18 PROCEDURE — 6360000002 HC RX W HCPCS: Performed by: NURSE PRACTITIONER

## 2022-05-18 PROCEDURE — 2500000003 HC RX 250 WO HCPCS: Performed by: RADIOLOGY

## 2022-05-18 PROCEDURE — 2580000003 HC RX 258: Performed by: EMERGENCY MEDICINE

## 2022-05-18 PROCEDURE — 6360000002 HC RX W HCPCS: Performed by: INTERNAL MEDICINE

## 2022-05-18 PROCEDURE — 0W9G3ZZ DRAINAGE OF PERITONEAL CAVITY, PERCUTANEOUS APPROACH: ICD-10-PCS | Performed by: INTERNAL MEDICINE

## 2022-05-18 PROCEDURE — 94761 N-INVAS EAR/PLS OXIMETRY MLT: CPT

## 2022-05-18 PROCEDURE — 93010 ELECTROCARDIOGRAM REPORT: CPT | Performed by: FAMILY MEDICINE

## 2022-05-18 PROCEDURE — 85025 COMPLETE CBC W/AUTO DIFF WBC: CPT

## 2022-05-18 PROCEDURE — 2709999900 US GUIDED PARACENTESIS

## 2022-05-18 PROCEDURE — 80053 COMPREHEN METABOLIC PANEL: CPT

## 2022-05-18 PROCEDURE — 74177 CT ABD & PELVIS W/CONTRAST: CPT

## 2022-05-18 PROCEDURE — 2580000003 HC RX 258: Performed by: INTERNAL MEDICINE

## 2022-05-18 PROCEDURE — 96375 TX/PRO/DX INJ NEW DRUG ADDON: CPT

## 2022-05-18 PROCEDURE — 85610 PROTHROMBIN TIME: CPT

## 2022-05-18 PROCEDURE — 99285 EMERGENCY DEPT VISIT HI MDM: CPT

## 2022-05-18 PROCEDURE — 6370000000 HC RX 637 (ALT 250 FOR IP)

## 2022-05-18 PROCEDURE — A4216 STERILE WATER/SALINE, 10 ML: HCPCS | Performed by: NURSE PRACTITIONER

## 2022-05-18 PROCEDURE — 97166 OT EVAL MOD COMPLEX 45 MIN: CPT

## 2022-05-18 PROCEDURE — 93005 ELECTROCARDIOGRAM TRACING: CPT | Performed by: INTERNAL MEDICINE

## 2022-05-18 PROCEDURE — 1200000000 HC SEMI PRIVATE

## 2022-05-18 PROCEDURE — 2580000003 HC RX 258: Performed by: NURSE PRACTITIONER

## 2022-05-18 PROCEDURE — 83735 ASSAY OF MAGNESIUM: CPT

## 2022-05-18 PROCEDURE — 2500000003 HC RX 250 WO HCPCS: Performed by: NURSE PRACTITIONER

## 2022-05-18 PROCEDURE — 99255 IP/OBS CONSLTJ NEW/EST HI 80: CPT | Performed by: INTERNAL MEDICINE

## 2022-05-18 PROCEDURE — A4216 STERILE WATER/SALINE, 10 ML: HCPCS | Performed by: INTERNAL MEDICINE

## 2022-05-18 PROCEDURE — 6360000004 HC RX CONTRAST MEDICATION: Performed by: EMERGENCY MEDICINE

## 2022-05-18 RX ORDER — METOCLOPRAMIDE 10 MG/1
10 TABLET ORAL 4 TIMES DAILY PRN
Status: DISCONTINUED | OUTPATIENT
Start: 2022-05-18 | End: 2022-05-18

## 2022-05-18 RX ORDER — POLYETHYLENE GLYCOL 3350 17 G/17G
17 POWDER, FOR SOLUTION ORAL DAILY PRN
Status: DISCONTINUED | OUTPATIENT
Start: 2022-05-18 | End: 2022-05-18

## 2022-05-18 RX ORDER — ONDANSETRON 4 MG/1
4 TABLET, ORALLY DISINTEGRATING ORAL EVERY 8 HOURS PRN
Status: DISCONTINUED | OUTPATIENT
Start: 2022-05-18 | End: 2022-05-19

## 2022-05-18 RX ORDER — SODIUM CHLORIDE 0.9 % (FLUSH) 0.9 %
10 SYRINGE (ML) INJECTION EVERY 12 HOURS SCHEDULED
Status: DISCONTINUED | OUTPATIENT
Start: 2022-05-18 | End: 2022-05-19 | Stop reason: HOSPADM

## 2022-05-18 RX ORDER — MORPHINE SULFATE 4 MG/ML
4 INJECTION, SOLUTION INTRAMUSCULAR; INTRAVENOUS EVERY 4 HOURS PRN
Status: DISCONTINUED | OUTPATIENT
Start: 2022-05-18 | End: 2022-05-18

## 2022-05-18 RX ORDER — SODIUM CHLORIDE 9 MG/ML
INJECTION, SOLUTION INTRAVENOUS CONTINUOUS
Status: DISCONTINUED | OUTPATIENT
Start: 2022-05-18 | End: 2022-05-19 | Stop reason: HOSPADM

## 2022-05-18 RX ORDER — SCOLOPAMINE TRANSDERMAL SYSTEM 1 MG/1
1 PATCH, EXTENDED RELEASE TRANSDERMAL
Status: DISCONTINUED | OUTPATIENT
Start: 2022-05-18 | End: 2022-05-18

## 2022-05-18 RX ORDER — ONDANSETRON 2 MG/ML
INJECTION INTRAMUSCULAR; INTRAVENOUS
Status: DISPENSED
Start: 2022-05-18 | End: 2022-05-19

## 2022-05-18 RX ORDER — SCOLOPAMINE TRANSDERMAL SYSTEM 1 MG/1
1 PATCH, EXTENDED RELEASE TRANSDERMAL
Status: DISCONTINUED | OUTPATIENT
Start: 2022-05-18 | End: 2022-05-19 | Stop reason: HOSPADM

## 2022-05-18 RX ORDER — LORAZEPAM 2 MG/ML
1 INJECTION INTRAMUSCULAR EVERY 6 HOURS PRN
Status: DISCONTINUED | OUTPATIENT
Start: 2022-05-18 | End: 2022-05-19 | Stop reason: HOSPADM

## 2022-05-18 RX ORDER — ONDANSETRON 2 MG/ML
4 INJECTION INTRAMUSCULAR; INTRAVENOUS ONCE
Status: COMPLETED | OUTPATIENT
Start: 2022-05-18 | End: 2022-05-18

## 2022-05-18 RX ORDER — MORPHINE SULFATE 4 MG/ML
4 INJECTION, SOLUTION INTRAMUSCULAR; INTRAVENOUS
Status: DISCONTINUED | OUTPATIENT
Start: 2022-05-18 | End: 2022-05-19 | Stop reason: HOSPADM

## 2022-05-18 RX ORDER — ONDANSETRON 2 MG/ML
INJECTION INTRAMUSCULAR; INTRAVENOUS
Status: DISPENSED
Start: 2022-05-18 | End: 2022-05-18

## 2022-05-18 RX ORDER — SODIUM CHLORIDE 9 MG/ML
INJECTION, SOLUTION INTRAVENOUS PRN
Status: DISCONTINUED | OUTPATIENT
Start: 2022-05-18 | End: 2022-05-19 | Stop reason: HOSPADM

## 2022-05-18 RX ORDER — 0.9 % SODIUM CHLORIDE 0.9 %
1000 INTRAVENOUS SOLUTION INTRAVENOUS ONCE
Status: COMPLETED | OUTPATIENT
Start: 2022-05-18 | End: 2022-05-18

## 2022-05-18 RX ORDER — ONDANSETRON 2 MG/ML
4 INJECTION INTRAMUSCULAR; INTRAVENOUS EVERY 6 HOURS PRN
Status: DISCONTINUED | OUTPATIENT
Start: 2022-05-18 | End: 2022-05-19

## 2022-05-18 RX ORDER — NICOTINE 21 MG/24HR
1 PATCH, TRANSDERMAL 24 HOURS TRANSDERMAL DAILY
Status: DISCONTINUED | OUTPATIENT
Start: 2022-05-18 | End: 2022-05-19 | Stop reason: HOSPADM

## 2022-05-18 RX ORDER — SCOLOPAMINE TRANSDERMAL SYSTEM 1 MG/1
PATCH, EXTENDED RELEASE TRANSDERMAL
Status: DISPENSED
Start: 2022-05-18 | End: 2022-05-18

## 2022-05-18 RX ORDER — POTASSIUM CHLORIDE 20 MEQ/1
TABLET, EXTENDED RELEASE ORAL
Status: COMPLETED
Start: 2022-05-18 | End: 2022-05-18

## 2022-05-18 RX ORDER — DEXAMETHASONE SODIUM PHOSPHATE 4 MG/ML
2 INJECTION, SOLUTION INTRA-ARTICULAR; INTRALESIONAL; INTRAMUSCULAR; INTRAVENOUS; SOFT TISSUE DAILY
Status: DISCONTINUED | OUTPATIENT
Start: 2022-05-19 | End: 2022-05-19 | Stop reason: HOSPADM

## 2022-05-18 RX ORDER — METOCLOPRAMIDE HYDROCHLORIDE 5 MG/ML
5 INJECTION INTRAMUSCULAR; INTRAVENOUS EVERY 6 HOURS SCHEDULED
Status: DISCONTINUED | OUTPATIENT
Start: 2022-05-18 | End: 2022-05-19 | Stop reason: HOSPADM

## 2022-05-18 RX ORDER — 0.9 % SODIUM CHLORIDE 0.9 %
500 INTRAVENOUS SOLUTION INTRAVENOUS ONCE
Status: COMPLETED | OUTPATIENT
Start: 2022-05-18 | End: 2022-05-18

## 2022-05-18 RX ORDER — ACETAMINOPHEN 325 MG/1
650 TABLET ORAL EVERY 6 HOURS PRN
Status: DISCONTINUED | OUTPATIENT
Start: 2022-05-18 | End: 2022-05-18

## 2022-05-18 RX ORDER — OLANZAPINE 2.5 MG/1
2.5 TABLET ORAL NIGHTLY
Status: DISCONTINUED | OUTPATIENT
Start: 2022-05-18 | End: 2022-05-19 | Stop reason: HOSPADM

## 2022-05-18 RX ORDER — MORPHINE SULFATE 4 MG/ML
INJECTION, SOLUTION INTRAMUSCULAR; INTRAVENOUS
Status: DISPENSED
Start: 2022-05-18 | End: 2022-05-18

## 2022-05-18 RX ORDER — DEXAMETHASONE 0.5 MG/1
2 TABLET ORAL DAILY
Status: DISCONTINUED | OUTPATIENT
Start: 2022-05-18 | End: 2022-05-18

## 2022-05-18 RX ORDER — POLYETHYLENE GLYCOL 3350 17 G/17G
17 POWDER, FOR SOLUTION ORAL DAILY PRN
Status: DISCONTINUED | OUTPATIENT
Start: 2022-05-18 | End: 2022-05-18 | Stop reason: SDUPTHER

## 2022-05-18 RX ORDER — BISACODYL 10 MG
10 SUPPOSITORY, RECTAL RECTAL DAILY PRN
Status: DISCONTINUED | OUTPATIENT
Start: 2022-05-18 | End: 2022-05-19 | Stop reason: HOSPADM

## 2022-05-18 RX ORDER — POTASSIUM CHLORIDE 20 MEQ/1
40 TABLET, EXTENDED RELEASE ORAL 2 TIMES DAILY
Status: DISCONTINUED | OUTPATIENT
Start: 2022-05-18 | End: 2022-05-18

## 2022-05-18 RX ORDER — MORPHINE SULFATE 4 MG/ML
4 INJECTION, SOLUTION INTRAMUSCULAR; INTRAVENOUS ONCE
Status: COMPLETED | OUTPATIENT
Start: 2022-05-18 | End: 2022-05-18

## 2022-05-18 RX ORDER — ACETAMINOPHEN 650 MG/1
650 SUPPOSITORY RECTAL EVERY 6 HOURS PRN
Status: DISCONTINUED | OUTPATIENT
Start: 2022-05-18 | End: 2022-05-19 | Stop reason: HOSPADM

## 2022-05-18 RX ORDER — LIDOCAINE HYDROCHLORIDE 10 MG/ML
INJECTION, SOLUTION EPIDURAL; INFILTRATION; INTRACAUDAL; PERINEURAL
Status: COMPLETED | OUTPATIENT
Start: 2022-05-18 | End: 2022-05-18

## 2022-05-18 RX ORDER — ENOXAPARIN SODIUM 100 MG/ML
40 INJECTION SUBCUTANEOUS DAILY
Status: DISCONTINUED | OUTPATIENT
Start: 2022-05-18 | End: 2022-05-19 | Stop reason: HOSPADM

## 2022-05-18 RX ORDER — ONDANSETRON 2 MG/ML
4 INJECTION INTRAMUSCULAR; INTRAVENOUS EVERY 6 HOURS PRN
Status: DISCONTINUED | OUTPATIENT
Start: 2022-05-18 | End: 2022-05-18

## 2022-05-18 RX ORDER — SODIUM CHLORIDE 0.9 % (FLUSH) 0.9 %
10 SYRINGE (ML) INJECTION PRN
Status: DISCONTINUED | OUTPATIENT
Start: 2022-05-18 | End: 2022-05-19 | Stop reason: HOSPADM

## 2022-05-18 RX ADMIN — POTASSIUM CHLORIDE 40 MEQ: 1500 TABLET, EXTENDED RELEASE ORAL at 07:40

## 2022-05-18 RX ADMIN — SODIUM CHLORIDE 40 MG: 9 INJECTION INTRAMUSCULAR; INTRAVENOUS; SUBCUTANEOUS at 07:22

## 2022-05-18 RX ADMIN — OLANZAPINE 2.5 MG: 2.5 TABLET, FILM COATED ORAL at 20:24

## 2022-05-18 RX ADMIN — DEXAMETHASONE 2 MG: 0.5 TABLET ORAL at 07:24

## 2022-05-18 RX ADMIN — METOCLOPRAMIDE 5 MG: 5 INJECTION, SOLUTION INTRAMUSCULAR; INTRAVENOUS at 14:29

## 2022-05-18 RX ADMIN — ONDANSETRON 4 MG: 2 INJECTION INTRAMUSCULAR; INTRAVENOUS at 20:29

## 2022-05-18 RX ADMIN — MORPHINE SULFATE 4 MG: 4 INJECTION, SOLUTION INTRAMUSCULAR; INTRAVENOUS at 12:48

## 2022-05-18 RX ADMIN — POTASSIUM CHLORIDE 40 MEQ: 1500 TABLET, EXTENDED RELEASE ORAL at 03:25

## 2022-05-18 RX ADMIN — ONDANSETRON 4 MG: 2 INJECTION INTRAMUSCULAR; INTRAVENOUS at 01:50

## 2022-05-18 RX ADMIN — LORAZEPAM 1 MG: 2 INJECTION INTRAMUSCULAR; INTRAVENOUS at 13:13

## 2022-05-18 RX ADMIN — MORPHINE SULFATE 4 MG: 4 INJECTION INTRAVENOUS at 01:50

## 2022-05-18 RX ADMIN — LIDOCAINE HYDROCHLORIDE 10 ML: 10 INJECTION, SOLUTION EPIDURAL; INFILTRATION; INTRACAUDAL; PERINEURAL at 13:35

## 2022-05-18 RX ADMIN — SODIUM CHLORIDE 1000 ML: 9 INJECTION, SOLUTION INTRAVENOUS at 01:35

## 2022-05-18 RX ADMIN — FAMOTIDINE 20 MG: 10 INJECTION INTRAVENOUS at 14:29

## 2022-05-18 RX ADMIN — MORPHINE SULFATE 4 MG: 4 INJECTION, SOLUTION INTRAMUSCULAR; INTRAVENOUS at 19:14

## 2022-05-18 RX ADMIN — ONDANSETRON 4 MG: 2 INJECTION INTRAMUSCULAR; INTRAVENOUS at 07:21

## 2022-05-18 RX ADMIN — FAMOTIDINE 20 MG: 10 INJECTION INTRAVENOUS at 20:24

## 2022-05-18 RX ADMIN — MORPHINE SULFATE 4 MG: 4 INJECTION, SOLUTION INTRAMUSCULAR; INTRAVENOUS at 07:22

## 2022-05-18 RX ADMIN — SODIUM CHLORIDE: 9 INJECTION, SOLUTION INTRAVENOUS at 07:26

## 2022-05-18 RX ADMIN — SODIUM CHLORIDE 500 ML: 9 INJECTION, SOLUTION INTRAVENOUS at 03:44

## 2022-05-18 RX ADMIN — METOCLOPRAMIDE 5 MG: 5 INJECTION, SOLUTION INTRAMUSCULAR; INTRAVENOUS at 17:47

## 2022-05-18 RX ADMIN — IOPAMIDOL 75 ML: 755 INJECTION, SOLUTION INTRAVENOUS at 02:47

## 2022-05-18 ASSESSMENT — PAIN DESCRIPTION - PAIN TYPE
TYPE: ACUTE PAIN
TYPE: ACUTE PAIN

## 2022-05-18 ASSESSMENT — LIFESTYLE VARIABLES: HOW OFTEN DO YOU HAVE A DRINK CONTAINING ALCOHOL: NEVER

## 2022-05-18 ASSESSMENT — PAIN - FUNCTIONAL ASSESSMENT
PAIN_FUNCTIONAL_ASSESSMENT: PREVENTS OR INTERFERES SOME ACTIVE ACTIVITIES AND ADLS
PAIN_FUNCTIONAL_ASSESSMENT: ACTIVITIES ARE NOT PREVENTED
PAIN_FUNCTIONAL_ASSESSMENT: 0-10
PAIN_FUNCTIONAL_ASSESSMENT: ACTIVITIES ARE NOT PREVENTED
PAIN_FUNCTIONAL_ASSESSMENT: PREVENTS OR INTERFERES SOME ACTIVE ACTIVITIES AND ADLS

## 2022-05-18 ASSESSMENT — PAIN SCALES - GENERAL
PAINLEVEL_OUTOF10: 10
PAINLEVEL_OUTOF10: 8
PAINLEVEL_OUTOF10: 3
PAINLEVEL_OUTOF10: 10
PAINLEVEL_OUTOF10: 0
PAINLEVEL_OUTOF10: 8
PAINLEVEL_OUTOF10: 0
PAINLEVEL_OUTOF10: 5
PAINLEVEL_OUTOF10: 10
PAINLEVEL_OUTOF10: 4

## 2022-05-18 ASSESSMENT — PAIN DESCRIPTION - LOCATION
LOCATION: ABDOMEN

## 2022-05-18 ASSESSMENT — PAIN DESCRIPTION - ORIENTATION
ORIENTATION: MID
ORIENTATION: ANTERIOR
ORIENTATION: MID
ORIENTATION: ANTERIOR

## 2022-05-18 ASSESSMENT — PAIN DESCRIPTION - DESCRIPTORS
DESCRIPTORS: CRAMPING;STABBING
DESCRIPTORS: SHARP;ACHING;CRAMPING
DESCRIPTORS: ACHING;SHARP
DESCRIPTORS: CRAMPING;STABBING
DESCRIPTORS: ACHING
DESCRIPTORS: ACHING;SHOOTING;SHARP

## 2022-05-18 ASSESSMENT — ENCOUNTER SYMPTOMS
DIARRHEA: 1
ABDOMINAL PAIN: 1
VOMITING: 1
SHORTNESS OF BREATH: 0
NAUSEA: 1
BLOOD IN STOOL: 0

## 2022-05-18 ASSESSMENT — PAIN DESCRIPTION - FREQUENCY
FREQUENCY: CONTINUOUS

## 2022-05-18 ASSESSMENT — PAIN SCALES - WONG BAKER: WONGBAKER_NUMERICALRESPONSE: 0

## 2022-05-18 ASSESSMENT — PAIN DESCRIPTION - ONSET: ONSET: ON-GOING

## 2022-05-18 NOTE — PROGRESS NOTES
Spoke with Rosalesinnguyen David and his wife Haider Chan at the bedside. States that the would like for Sanam Hernandez to go home with [facility]. [facility] notified of consult. They will arrive 5-6 pm tonight. 2 Mayi Watters updated. Denies further needs.      [address] [facility] Nurse Coordinator  5/18/2022 2:43 PM

## 2022-05-18 NOTE — CONSULTS
Today's Date: 5/18/2022  Patient Name: Jeffery Granda  Date of admission: 5/18/2022 12:51 AM  Patient's age: 63 y.o., 1960  Admission Dx: Dehydration [E86.0]  Hypokalemia [E87.6]  Cholangiocarcinoma ([address] [C22.1]  Intractable vomiting with nausea, unspecified vomiting type [R11.2]    Reason for Consult: management recommendations  Requesting Physician: Kavin Chavira MD    CHIEF COMPLAINT: Progressive nausea and vomiting, progressive cholangiocarcinoma    History Obtained From:  patient    HISTORY OF PRESENT ILLNESS:      The patient is a 63 y.o.  male who is admitted to the hospital for progressive nausea and vomiting and abdominal distention secondary to ascites. The patient has diagnosed cholangiocarcinoma and has been treated with chemotherapy. He was on first-line FOLFOX chemotherapy but he progressed and more recently was transitioned to FOLFIRI chemotherapy. Chemotherapy was very poorly tolerated and has been off chemotherapy for multiple weeks due to recovery from acute illness. Patient condition has been deteriorating. He has weight loss, progressive abdominal distention. Recurrent nausea and vomiting. Poor nutrition and declining performance status. Past Medical History:   has a past medical history of Anxiety, Aspiration pneumonia due to inhalation of vomitus ([address] Chronic back pain, Depression, Gastroesophageal reflux disease without esophagitis, Intrahepatic cholangiocarcinoma ([address] 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[address] Peritoneal metastases ([address] and Severe malnutrition ([address] Past Surgical History:   has a past surgical history that includes Mandible surgery; ERCP (08/07/2020); ERCP (8/7/2020); Port Surgery (Left, 09/18/2020); Port Surgery (Left, 9/18/2020); Upper gastrointestinal endoscopy (N/A, 10/15/2021); and Cholecystectomy. Medications:    Reviewed in Epic     Allergies:  Asa [aspirin] and Chocolate    Social History:   reports that he has been smoking cigarettes. He has a 5.00 pack-year smoking history. He has never used smokeless tobacco. He reports previous alcohol use of about 5.8 standard drinks of alcohol per week. He reports current drug use. Drug: Marijuana Carlisle Mejia). Family History: family history includes Diabetes in his father; Heart Disease in his father; High Blood Pressure in his father and mother; Hypertension in his father and mother; Stroke in his mother. REVIEW OF SYSTEMS:    Constitutional: Progressive decline in performance status, weight loss,  Eyes: No eye discharge, double vision, or eye pain   HEENT: negative for sore mouth, sore throat, hoarseness and voice change   Respiratory: negative for cough , sputum, dyspnea, wheezing, hemoptysis, chest pain   Cardiovascular: negative for chest pain, dyspnea, palpitations, orthopnea, PND   Gastrointestinal: Abdominal pain and distention  Genitourinary: negative for frequency, dysuria, nocturia, urinary incontinence, and hematuria   Integument: negative for rash, skin lesions, bruises.    Hematologic/Lymphatic: negative for easy bruising, bleeding, lymphadenopathy, or petechiae   Endocrine: negative for heat or cold intolerance,weight changes, change in bowel habits and hair loss   Musculoskeletal: negative for myalgias, arthralgias, pain, joint swelling,and bone pain   Neurological: negative for headaches, dizziness, seizures, weakness, numbness    PHYSICAL EXAM:      /81   Pulse 75   Temp 97.9 °F (36.6 °C) (Temporal)   Resp 18   Ht 5' 9\" (1.753 m)   Wt 147 lb 3.2 oz (66.8 kg)   SpO2 97%   BMI 21.74 kg/m²    Temp (24hrs), Av.4 °F (36.3 °C), Min:97.1 °F (36.2 °C), Max:97.9 °F (36.6 °C)    General appearance - well appearing, no in pain or distress   Mental status - alert and cooperative   Eyes - pupils equal and reactive, extraocular eye movements intact   Ears - bilateral TM's and external ear canals normal   Mouth - mucous membranes moist, pharynx normal without lesions   Neck - supple, no significant adenopathy   Lymphatics - no palpable lymphadenopathy, no hepatosplenomegaly   Chest - clear to auscultation, no wheezes, rales or rhonchi, symmetric air entry   Heart - normal rate, regular rhythm, normal S1, S2, no murmurs  Abdomen -distended, mild diffuse tenderness  Neurological - alert, oriented, normal speech, no focal findings or movement disorder noted   Musculoskeletal - no joint tenderness, deformity or swelling   Extremities - peripheral pulses normal, no pedal edema, no clubbing or cyanosis   Skin - normal coloration and turgor, no rashes, no suspicious skin lesions noted ,    DATA:    Labs:   CBC: Recent Labs     05/18/22 0138   WBC 6.9   HGB 13.5   HCT 39.9*        BMP:   Recent Labs     05/16/22  0545 05/18/22 0138   * 136   K 4.2 3.0*   CO2 32* 33*   BUN 15 15   CREATININE 0.40* 0.44*   LABGLOM >60 >60   GLUCOSE 72 95     PT/INR:   Recent Labs     05/18/22 0138   PROTIME 14.4*   INR 1.1       IMAGING DATA:      Primary Problem  Intractable vomiting with nausea    Active Hospital Problems    Diagnosis Date Noted    Dehydration [E86.0] 05/18/2022     Priority: Medium    Peritoneal metastases (HCC) [C78.6] 05/16/2022     Priority: Medium    Severe malnutrition ([facility] [E43] 05/14/2022     Priority: Medium     Class: Present on Admission    Intractable vomiting with nausea [R11.2] 05/13/2022     Priority: Medium    Intrahepatic cholangiocarcinoma ([facility] [C22.1] 11/16/2021    Abdominal pain [R10.9] 10/11/2021    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 Abdominal distention with ascites  2. Progressive cholangiocarcinoma  3. Failed 2 lines of chemotherapy  4. Intractable nausea and vomiting, likely secondary to progressive cancer    RECOMMENDATIONS:  1. I had a very long discussion with patient about goals of care. The patient has failed 2 lines of therapy and has progressive disease.   He understand that he has stage IV disease and the goals of care will be palliative. After discussion of further options of chemotherapy, the patient is not interested in any future therapy and wants palliative care. 2. The patient has been seen by palliative care through bridge, we will contact him and transition him to hospice. 3. I contacted his primary oncologist who is in agreement with comfort measures and stopping chemotherapy  4. Agree with palliative paracentesis  5. Continue with pain medication  6. For the nausea and vomiting, I think the paracentesis will help significantly but I recommend continuing with supportive care including Zofran as needed. We will add Zyprexa 2.5 mg nightly to help with nausea and also improve the appetite. Discussed with patient and Nurse. Thank you for asking us to see this patient.     Yamilka Shaikh MD  Hematologist/Medical Oncologist  Cell: [phone]

## 2022-05-18 NOTE — PROGRESS NOTES
Patient admitted to [facility] at this time. Patient is unsteady when moving from wheelchair to bed, with bilateral weakness noted in BLE. Patient is currently rating pain 5/10, in RLQ. Active in bowel sounds in all four quadrants. Reports decreased appetite with nausea. Respirations are WDL, with unlabored work of breath. Call light placed within reach, bed in lowest position. Patient oriented to room and operations of call light. Will continue to monitor.

## 2022-05-18 NOTE — SEDATION DOCUMENTATION
No sedation used during procedure. Patient tolerated well without complaints. Patient returned to inpatient room.

## 2022-05-18 NOTE — BRIEF OP NOTE
Brief Postoperative Note    Jeni Patrick  YOB: 1960  [id number]    Pre-operative Diagnosis: ascites     Post-operative Diagnosis: Same    Procedure: para    Anesthesia: Local    Surgeons/Assistants: Bin Hernandez MD    Estimated Blood Loss: 0    Complications: None    Specimens: Was Obtained: ascites     Findings: successful para, dutch ascites obtained     Electronically signed by Bin Hernandez MD on 5/18/2022 at 2:10 PM

## 2022-05-18 NOTE — PROGRESS NOTES
Occupational Therapy  Facility/Department: [facility] AT THE [facility] MED SURG  Occupational Therapy Initial Assessment    Name: Mary Wheeler  : 1960  MRN: [id number]  Date of Service: 2022    Discharge Recommendations:  Continue to assess pending progress,Home with Home health OT,Home with assist PRN          Patient Diagnosis(es): The primary encounter diagnosis was Cholangiocarcinoma [facility]). Diagnoses of Intractable vomiting with nausea, unspecified vomiting type, Dehydration, and Hypokalemia were also pertinent to this visit. Past Medical History:  has a past medical history of Anxiety, Aspiration pneumonia due to inhalation of vomitus ([facility] Chronic back pain, Depression, Gastroesophageal reflux disease without esophagitis, Intrahepatic cholangiocarcinoma ([facility] 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[facility] Peritoneal metastases ([facility] and Severe malnutrition ([facility] Past Surgical History:  has a past surgical history that includes Mandible surgery; ERCP (2020); ERCP (2020); Port Surgery (Left, 2020); Port Surgery (Left, 2020); Upper gastrointestinal endoscopy (N/A, 10/15/2021); and Cholecystectomy. Treatment Diagnosis: Generalized weakness      Assessment   Performance deficits / Impairments: Decreased functional mobility ; Decreased endurance;Decreased ADL status; Decreased balance;Decreased strength;Decreased high-level IADLs;Decreased safe awareness  Assessment: Pt is a 62 yo male with dehydration. Pt presents with significant decrease in strength, endurance, balance, and safety awareness with ADL tasks and functional mobility. Pt would benefit from skilled OT services to address these deficits and to ensure safe return home.   Treatment Diagnosis: Generalized weakness  Prognosis: Good  Decision Making: Medium Complexity  REQUIRES OT FOLLOW-UP: Yes  Activity Tolerance  Activity Tolerance: Patient Tolerated treatment well  Activity Tolerance Comments: Pt vomitted at end of treatment session and reported that he has this happen to him after walking when he goes to sit down. Plan   Plan  Times per Week: 7  Times per Day: Daily  Current Treatment Recommendations: Strengthening,Safety education & training,Self-Care / ADL,Endurance training,Patient/Caregiver education & training,Neuromuscular re-education     Subjective   General  Chart Reviewed: Yes  Patient assessed for rehabilitation services?: Yes  Family / Caregiver Present: No  Referring Practitioner: Jaret Villanueva MD  Diagnosis: Dehydration  Subjective  Subjective: Pt rec'd in bed, pleasant and cooperative for OT. General Comment  Comments: Pt reports 5-6/10 pain in abdomen region. Social/Functional History  Social/Functional History  Lives With: Spouse  Type of Home: House  Home Layout: Two level,Able to Live on Main level with bedroom/bathroom  Home Access: Stairs to enter without rails  Entrance Stairs - Number of Steps: 1  Bathroom Shower/Tub: Walk-in shower  Bathroom Toilet: Handicap height  Bathroom Equipment: Grab bars in [place] [facility] chair  Home Equipment: Cane (uses cane occassionally)  ADL Assistance: [address] [place] [address] Avenue: Independent  Homemaking Responsibilities: Yes  Ambulation Assistance: Independent  Transfer Assistance: Independent  Active : No (pt reports that he has not been driving much because he has been having light-headedness)       Objective   Pulse: 81  Heart Rate Source: Monitor; Apical  BP: 114/85  BP Location: Left upper arm  Patient Position: Sitting  MAP (Calculated): 94.67  Resp: 18  SpO2: 97 %  O2 Device: None (Room air)  Vision Exceptions: Wears glasses at all times  Hearing: Within functional limits    Safety Devices  Type of Devices: All fall risk precautions in place; Left in bed;Call light within reach;Nurse notified  AROM: Within functional limits  Strength: Generally decreased, functional  ADL  Feeding: Independent  Grooming: Modified independent   UE Bathing: Setup  LE Bathing: Setup  UE Dressing: Setup  LE Dressing: Stand by assistance  LE Dressing Skilled Clinical Factors: Pt attempted to complete LB dressing of doffing pants while standing. Pt educated to sit during LB dressing for improved safety d/t decreased balance. Toileting: Contact guard assistance  Bed mobility  Supine to Sit: Stand by assistance  Sit to Supine: Stand by assistance  Scooting: Stand by assistance  Transfers  Stand Step Transfers: Contact guard assistance  Sit to stand: Contact guard assistance  Stand to sit: Contact guard assistance  Transfer Comments: Pt able to complete functional mobility from bed <> bathroom with no AD and CGA. Pt noted to have decreased balance and would grab for objects for steadiness. Cognition  Overall Cognitive Status: [facility]  Education Given To: Patient  Education Provided: Role of Therapy;Plan of Care  Education Method: Verbal  Barriers to Learning: None  Education Outcome: Verbalized understanding    AM-PAC Score  AM-PAC Inpatient Daily Activity Raw Score: 22 (05/18/22 1107)  AM-PAC Inpatient ADL T-Scale Score : 47.1 (05/18/22 1107)  ADL Inpatient CMS 0-100% Score: 25.8 (05/18/22 1107)  ADL Inpatient CMS G-Code Modifier : CJ (05/18/22 1107)    Goals  Short Term Goals  Time Frame for Short term goals: 20 visits  Short Term Goal 1: Patient will tolerate 15 mins of BUE ther ex/act to increase overall strength/activity tolerance for functional tasks. Short Term Goal 2: Patient to engage in 5' of dynamic standing task during functional task of choice s LOB to improve safety, balance and indep c ADL. Short Term Goal 3: Patient to complete ADL routine c Mod I c AE/DME and implementation of EC/WS techniques as needed to ensure safe return home. Short Term Goal 4: Patient to be educated on d/c folder, AE/DME and EC/WS techniques to ensure safe and indep return home.        Therapy Time   Individual Concurrent Group Co-treatment   Time In 1045         Time Out 1100         Minutes 15 Sebastian Guillen, OTD, OTR/L

## 2022-05-18 NOTE — PROGRESS NOTES
Writer at bedside for shift assessment. Patient sitting up in bed visiting with family, respirations are even and unlabored while on room air. Vitals obtained and assessment completed, see flowsheet for details. Pain medication given, see MAR for details. pt denies further needs at this time. Call light in reach, will continue to monitor.

## 2022-05-18 NOTE — ED PROVIDER NOTES
[facility] Emergency Department ED  EMERGENCY DEPARTMENT ENCOUNTER      Pt Name: Mi Freed  MRN: [id number]  [place] 1960  Date of evaluation: 5/18/2022  Provider: Donna Cabrera MD    CHIEF COMPLAINT       Chief Complaint   Patient presents with    Abdominal Pain     unable to fill prescriptions    Emesis    Dizziness    Leg Swelling     Left started today         HISTORY OF PRESENT ILLNESS   (Location/Symptom, Timing/Onset, Context/Setting, Quality, Duration, Modifying Factors, Severity)  Note limiting factors. Mi Freed is a 63 y.o. male who presents to the emergency department for evaluation of abdominal pain, nausea and vomiting. Has a history of cholangiocarcinoma with peritoneal metastasis. Was recently admitted from 5/13 to 5/16 for intractable vomiting. Was discharged on Protonix and scopolamine patches, but reports he has not been able to pick these up secondary to pharmacy availability. [facility] non-bloody emesis as well as diarrhea recently. Notes that \"my legs have blown up recently\" as well. No leg pain. No other acute complaints. Nursing Notes were reviewed. REVIEW OF SYSTEMS    (2-9 systems for level 4, 10 or more for level 5)     Review of Systems   Constitutional: Negative for fever. HENT: Negative. Respiratory: Negative for shortness of breath. Cardiovascular: Negative for chest pain. Gastrointestinal: Positive for abdominal pain, diarrhea, nausea and vomiting. Negative for blood in stool. Genitourinary: Negative for decreased urine volume and difficulty urinating. Except as noted above the remainder of the review of systems was reviewed and negative.        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  ERCP  8/7/2020    ERCP STENT INSERTION performed by Lashawn Olivarez MD at [address] Left 09/18/2020    left subclavian port insertion    PORT SURGERY Left 9/18/2020    PORT INSERTION, SUBCLAVIAN, W/C-ARM performed by Haylie Noguera DO at [address] [facility] 10/15/2021    EGD BIOPSY performed by Asa Carter MD at [facility]       Previous Medications    BISACODYL (DULCOLAX) 10 MG SUPPOSITORY    Place 1 suppository rectally daily as needed for Constipation    DEXAMETHASONE (DECADRON) 1 MG TABLET    Take 2 mg by mouth daily    HYDROMORPHONE (DILAUDID) 2 MG TABLET    Take 2 mg by mouth every 4 hours as needed.     LIDOCAINE-PRILOCAINE (EMLA) 2.5-2.5 % CREAM        METOCLOPRAMIDE (REGLAN) 10 MG TABLET    Take 1 tablet by mouth 4 times daily as needed (Nausea/vomiting)    MULTIPLE VITAMINS-MINERALS (THERAPEUTIC MULTIVITAMIN-MINERALS) TABLET    Take 1 tablet by mouth daily    NICOTINE (NICODERM CQ) 14 MG/24HR        NUTRITIONAL SUPPLEMENTS (BOOST) LIQD    Take 1 Can by mouth daily    ONDANSETRON (ZOFRAN ODT) 4 MG DISINTEGRATING TABLET    Take 1 tablet by mouth every 8 hours as needed for Nausea or Vomiting    PANTOPRAZOLE (PROTONIX) 40 MG TABLET    Take 1 tablet by mouth every morning (before breakfast)    POLYETHYLENE GLYCOL (GLYCOLAX) 17 G PACKET    Take 17 g by mouth daily as needed for Constipation    PROBIOTIC PRODUCT (PROBIOTIC-10 PO)    Take by mouth daily    PROMETHAZINE (PHENERGAN) 25 MG TABLET    Take 25 mg by mouth every 6 hours as needed    SCOPOLAMINE (TRANSDERM-SCOP) TRANSDERMAL PATCH    Place 1 patch onto the skin every 72 hours 1.5 mg patch delivers 1 mg over 3 days. Apply patch to hairless area behind the ear. ALLERGIES     Asa [aspirin] and Chocolate    FAMILY HISTORY       Family History   Problem Relation Age of Onset    Hypertension Mother     Stroke Mother     High Blood Pressure Mother     Diabetes Father     Hypertension Father     High Blood Pressure Father     Heart Disease Father           SOCIAL HISTORY       Social History     Socioeconomic History    Marital status:      Spouse name: Aliya    Number of children: 2    Years of education: 15    Highest education level: None   Occupational History    Occupation:    Tobacco Use    Smoking status: Current Every Day Smoker     Packs/day: 0.25     Years: 20.00     Pack years: 5.00     Types: Cigarettes    Smokeless tobacco: Never Used   Vaping Use    Vaping Use: Never used   Substance and Sexual Activity    Alcohol use: Yes     Alcohol/week: 5.8 standard drinks     Types: 7 Standard drinks or equivalent per week     Comment: Occ    Drug use: Yes     Types: Marijuana Jt Alarcon)     Comment: Occasionally    Sexual activity: Yes   Other Topics Concern    None   Social History Narrative    None     Social Determinants of Health     Financial Resource Strain: Low Risk     Difficulty of Paying Living Expenses: Not hard at all   Food Insecurity: No Food Insecurity    Worried About Running Out of Food in the Last Year: Never true    Brittney of Food in the Last Year: Never true   Transportation Needs:     Lack of Transportation (Medical): Not on file    Lack of Transportation (Non-Medical):  Not on file   Physical Activity:     Days of Exercise per Week: Not on file    Minutes of Exercise per Session: Not on file   Stress:     Feeling of Stress : Not on file   Social Connections:     Frequency of Communication with Friends and Family: Not on file    Frequency of Social Gatherings with Friends and Family: Not on file    Attends Sabianism Services: Not on file    Active Member of Clubs or Organizations: Not on file    Attends Club or Organization Meetings: Not on file    Marital Status: Not on file   Intimate Partner Violence:     Fear of Current or Ex-Partner: Not on file    Emotionally Abused: Not on file    Physically Abused: Not on file    Sexually Abused: Not on file   Housing Stability:     Unable to Pay for Housing in the Last Year: Not on file    Number of [place] in the Last Year: Not on file    Unstable Housing in the Last Year: Not on file       PHYSICAL EXAM    (up to 7 for level 4, 8 or more for level 5)     ED Triage Vitals [05/18/22 0052]   BP Temp Temp Source Pulse Resp SpO2 Height Weight   (!) 147/97 97.1 °F (36.2 °C) Tympanic 106 18 97 % -- --       Physical Exam  Vitals reviewed. Constitutional:       General: He is not in acute distress. Appearance: He is not diaphoretic. Comments: Cachectic     HENT:      Head: Normocephalic and atraumatic. Mouth/Throat:      Comments: OM dry. No swelling. No erythema or exudate. Eyes:      Comments: Normal conjunctivae. Anicteric. Cardiovascular:      Rate and Rhythm: Normal rate and regular rhythm. Heart sounds: No murmur heard. Pulmonary:      Effort: Pulmonary effort is normal.      Breath sounds: Normal breath sounds. Abdominal:      General: Bowel sounds are normal. There is distension (mid). Palpations: There is no mass or pulsatile mass. Tenderness: There is no abdominal tenderness. There is no guarding or rebound. Negative signs include Logan's sign. Musculoskeletal:      Comments: No LE swelling or calf TTP. Skin:     General: Skin is warm and dry. Coloration: Skin is not cyanotic or jaundiced. Neurological:      General: No focal deficit present. Mental Status: He is alert and oriented to person, place, and time.    Psychiatric:         Mood and Affect: Mood normal.         Behavior: Behavior normal.         DIAGNOSTIC RESULTS       RADIOLOGY:   Interpretation per the Radiologist below, if available at the time of this note:    CT ABDOMEN PELVIS W IV CONTRAST Additional Contrast? None   Final Result   1. Interval development of a moderate volume of malignant appearing ascites   throughout the abdomen and pelvis, with some areas of mild loculation as well   as questionable nodularity noted along loops of bowel for which it is   difficult to exclude peritoneal metastasis. 2. There is significant wall thickening involving the gastric body, with   chronic area of thickening and stricture involving the gastroduodenal   junction as noted on the previous examination. Though this may be related to   gastritis and possible peptic ulcer disease, metastatic involvement is   difficult to exclude. 3. Multiple significantly dilated loops of small bowel are noted with no   discrete transition point, with some nodular thickening along the small bowel   walls concerning for possible metastatic involvement. 4. Fluid is seen within the distal thoracic esophagus which may be related to   reflux. 5. Patchy airspace disease in the lung bases bilaterally including a bandlike   area of subsegmental disease in the left lower lobe which may represent   atelectasis or pneumonia. 6. Diffuse atherosclerotic disease.                LABS:  Labs Reviewed   CBC WITH AUTO DIFFERENTIAL - Abnormal; Notable for the following components:       Result Value    RBC 3.93 (*)     Hematocrit 39.9 (*)     MCH 34.4 (*)     Seg Neutrophils 74 (*)     Lymphocytes 17 (*)     Eosinophils % 0 (*)     All other components within normal limits   COMPREHENSIVE METABOLIC PANEL W/ REFLEX TO MG FOR LOW K - Abnormal; Notable for the following components:    CREATININE 0.44 (*)     Bun/Cre Ratio 34 (*)     Potassium 3.0 (*)     Chloride 93 (*)     CO2 33 (*)     Alkaline Phosphatase 246 (*)     Total Bilirubin 1.24 (*)     Albumin 3.2 (*)     All other components within normal limits   PROTIME-INR - Abnormal; Notable for the following components:    Protime 14.4 (*)     All other components within normal limits   LIPASE - Abnormal; Notable for the following components:    Lipase 12 (*)     All other components within normal limits   MAGNESIUM       All other labs were within normal range or not returned as of this dictation. EMERGENCY DEPARTMENT COURSE and DIFFERENTIAL DIAGNOSIS/MDM:   Vitals:    Vitals:    05/18/22 0245 05/18/22 0330 05/18/22 0345 05/18/22 0400   BP: 113/73 109/71     Pulse:       Resp:       Temp:       TempSrc:       SpO2: 96% 97% 96% 96%       Patient presented to the ED in stable condition for evaluation of nausea, vomiting and abdominal pain. Was recently admitted for similar symptoms. States that he has not been able to \"keep anything down\" since leaving the hospital day and a half ago. On exam, noted to have a mildly distended abdomen. Nontender on palpation. Patient does appear cachectic and dehydrated. His physical exam is otherwise acutely unremarkable. CT imaging of the abdomen and pelvis was performed to evaluate for possible bowel obstruction secondary to likely metastasis given the patient's known cancer history. While there are some dilated loops of bowel, there is no evident transition point or definite evidence of obstruction. Dilated loops of bowel were seen on an outpatient CT performed on 4/27/22. However, there are multiple findings today concerning for significant progression of his cancer throughout the abdomen. Mild hypokalemia which was repleted with oral medication. IV fluids given for dehydration. Despite the scopolamine and administration of Zofran patient did have some additional emesis. CT suggest that the presence of either metastatic disease to the stomach or significant gastritis/PUD is present.   Accordingly, I do not believe I p.o. tolerance at this time is likely given the underlying disease burden. I will arrange for inpatient admission under the care of Dr. Leatha Marshall for continued pain management, consideration of IR for paracentesis, and hematology/oncology consultation. At this time there is no indication of SBP or other acute intra-abdominal infectious process. FINAL IMPRESSION      1. Cholangiocarcinoma ([facility] [address]    2. Intractable vomiting with nausea, unspecified vomiting type    3.  Dehydration          DISPOSITION/PLAN   DISPOSITION Decision To Admit 05/18/2022 04:15:09 AM    (Please note that portions of this note were completed with a voice recognition program.  Efforts were made to edit the dictations but occasionally words are mis-transcribed.)    Kalpesh Villaseñor MD (electronically signed)  Attending Emergency Physician            Kalpesh Villaseñor MD  05/18/22 9517

## 2022-05-18 NOTE — PROGRESS NOTES
Hospice nurse said they will  patient when he is home. Will need to be discharged by noon tomorrow to be seen by them tomorrow.

## 2022-05-18 NOTE — SEDATION DOCUMENTATION
Needle with catheter inserted into LLQ by doctor. Tubing attached to vacuum canister; drain yellow-green fluid.

## 2022-05-18 NOTE — PROGRESS NOTES
Met with Patient this a.m. to discuss discharge planning. Patient is a 63year old, , white male, admitted with dehydration related to his cancer treatment. Patient is alert and oriented, pleasant and cooperative with this assessment. States that he wishes to return home following this hospitalization. Patient resides with his wife in [place] just outside of [place]. He is disabled but worked as a  until recently. States that he received cancer diagnosis \"about 2 years ago. \"  Further states that he \"doesn't want someone to come in and cure him, just help him live as fully as he can. \"  Relates that he has a good informal support network in place of neighbors and friends. Wife works as a nurse with her job primarily located in [place] but he reports that sometimes she does have to travel for work. Patient uses a cane, crutches and a walker at home for support. Relies on family and friends for his transportation needs. Patient also utilizing [employer] [facility]. PCP is Xiomara Cason CNP. Patient reports that he is on his wife's insurance and sometimes his medications are costly. Discussed 'Good Rx' and other assistance programs for out of pocket costs. Patient is not eligible for FACT as he does not reside in [facility]. Discharge plan is home with wife when stable. Patient wishes to continue [employer] [facility] when he returns home. Information provided to him re:  [address] Patient Navigator program available. Patient is a '[facility]' and reports that he does have medical directives in place just not on file. Patient designates his wife and then his son as his decision makers if needed. LSW to remain involved and assist with discharge planning needs as appropriate.       Johnda. [address]  5/18/2022

## 2022-05-18 NOTE — ED NOTES
Contacted Dr. Rosalino Jerez answering service for  to  for admission. We are waiting for a call back.       Shirley Fishman  05/18/22 0675

## 2022-05-18 NOTE — CONSULTS
Palliative Care Inpatient Evaluation    NAME:  Shaneka Sánchez  MEDICAL RECORD NUMBER:  [id number]  AGE: 63 y.o. GENDER: male  : 1960  TODAY'S DATE:  2022    Reasons for Consultation:    Provision of information regarding PC and/or hospice philosophies  Complex, time-intensive communication and interdisciplinary psychosocial support  Clarification of goals of care and/or assistance with difficult decision-making  Guidance in regards to resources and transition(s)    Code Status: DNR-CCA    History of Present Illness     The patient is a 63 y.o. Non- / non  male who presents with Abdominal Pain (unable to fill prescriptions), Emesis, Dizziness, and Leg Swelling (Left started today)    Referred to Palliative Care by   [x] Physician   [] Nursing  [] Family Request   [] Other:       He was admitted to the hospitalist service for Dehydration [E86.0]  Hypokalemia [E87.6]  Cholangiocarcinoma ([address] [C22.1]  Intractable vomiting with nausea, unspecified vomiting type [R11.2]. His hospital course has been associated with Intractable vomiting with nausea.  The patient has a complicated medical history and has been hospitalized since 2022 12:51 AM.    Active Hospital Problems    Diagnosis Date Noted    Dehydration [E86.0] 2022     Priority: Medium    Peritoneal metastases ([address] [C78.6] 2022     Priority: Medium    Severe malnutrition ([address] [E43] 2022     Priority: Medium     Class: Present on Admission    Intractable vomiting with nausea [R11.2] 2022     Priority: Medium    Intrahepatic cholangiocarcinoma ([address] [C22.1] 2021    Abdominal pain [R10.9] 10/11/2021    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[address] [C24.0] 2020       Data        Code Status: DNR-CCA     ADVANCED CARE PLANNING:  Patient has capacity for medical decisions: yes  Health Care Power of : yes  Living Will: yes     Personal, Social, and Family History  Marital Status:   Living situation:with family:  spouse  Psychological Distress: moderate  Does patient understand diagnosis/treatment? yes  Does family/caregiver understand diagnosis/treatment? yes    Assessment        Palliative Performance Scale:    ___100% Full ambulation; normal activity and work; no evidence of disease; able to do own self care; normal intake; fully conscious  ___90% Full ambulation; normal activity and work; some evidence of disease; able to do own self care; normal intake; fully conscious  ___80% Full ambulation; normal activity with effort; some evidence of disease; able to do own self care; normal or reduced intake; fully conscious  ___70% Ambulation reduced; unable to perform normal job/work; significant disease; able to do own self care; normal or reduced intake; fully conscious  ___60%  Ambulation reduced; cannot do hobbies/housework; significant disease; occasional assist; intake normal or reduced; fully conscious/some confusion  ___50%  Mainly sit/lie; can't do any work; extensive disease; considerable assist; intake normal or reduced; fully conscious/some confusion  _x__40%  Mainly in bed; extensive disease; mainly assist; intake normal or reduced; fully conscious/ some confusion   ___30%  Bed bound; extensive disease; total care; intake reduced; fully conscious/some confusion  ___20%  Bed bound; extensive disease; total care; intake minimal; drowsy/coma  ___10%  Bed bound; extensive disease; total care; mouth care only; drowsy/coma  ___0       Death       Readmission Risk : 19%      Plan        Rita Hernández was seen by an oncologist earlier today and stated that he wished to be transitioned from [facility] palliative care to Hospice. He was diagnosed with mestatic cholangiocarcinoma. He has undergone 2 lines of chemotherapy and the cancer continues to progress. He has been seen in this ED 5/18/2022 with nausea and vomiting and was admitted 5/13/2022-5/16/2022 and 5/18 as well for the same.      Brief discussion with Rita Hernández at the bedside. States that the CNP is getting him some medications to help calm him down and he is going for a paracentesis shortly. States that he is very emotional about the decision that he had to make earlier this morning and becomes tearful. His wife arrives at this time and offers support. Nursing staff and Radiology staff arrive shortly after to take him for the procedure. States that he had a paracentesis not very long ago in [place] and he felt much better directly after the fluid was removed. Support provided. Wife sates that he becomes very emotional quickly and provides support. Wife would like to speak with CNP at this time. CNP notified of her arrival. Will follow up after procedure. Education/support to family  Education/support to patient  Discharge planning/helping to coordinate care  Pharmacologic pain management  Providing support for coping/adaptation/distress of family  Providing support for coping/adaptation/distress of patient  Managing anticipatory grief  Caregiver support/education  Medications to decrease non-pain symptoms  Validating patient/family distress    Principle Problem/Diagnosis:  Dehydration [E86.0]  Hypokalemia [E87.6]  Cholangiocarcinoma (HCC) [C22.1]  Intractable vomiting with nausea, unspecified vomiting type [R11.2]    Goals of care evaluation:  The patient goals of care are provide comfort care/support/palliation/relieve suffering, preparation for death, achievement of a peaceful death, spiritual needs, remain at home, preserve independence/autonomy/control and support for family/caregiver   Goals of care discussed with:    [] Patient independently    [x] Patient and Family    [] Family or Healthcare DPOA independently    [] Unable to discuss with patient, family/DPOA not present    Code Status  DNR-CCA    Palliative Care will continue to follow Mr. Abelino Strong kevin as needed. Thank you for allowing Palliative Care to participate in the care of Mr. Shantell Pina .     Electronically signed by   Eric Martinez RN  Palliative Care Team  on 5/18/2022 at 1:20 PM    Palliative care office: [phone]

## 2022-05-18 NOTE — H&P
History and Physical    Patient:  Marvin Liriano  MRN: [id number]    Chief Complaint: Nausea and vomiting abdominal pain    History Obtained From:  patient, electronic medical record    PCP: Cheryle Huh Might, JUAN CARLOS - DEANNA    History of Present Illness: The patient is a 63 y.o. male who resented to the emergency room with complaints of abdominal pain with nausea and vomiting. Patient was discharged 2 days prior for same complaints. 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 Patient was discharged on his last admission with Protonix and scopolamine patches however he stated the pharmacy was unable to fill those and did not call to have them sent elsewhere. CT abdomen and pelvis showed moderate volume of malignant ascites throughout abdomen and pelvis. There was significant wall thickening involving the gastric body with chronic area thickening and stricture involving the gastroduodenal junction. There is some concern for possible metastasis involvement of the small bowel walls and fluid seen in the distal thoracic esophagus. CODE STATUS:  Dr. Paz Martin had a lengthy conversation with him regarding his unfortunate diagnosis of stage 4 Intrahepatic cholangiocarcinoma with mets to peritoneal cavity. He understands that it is not curable. He does not wish to have intubation or CPR. At this time we will make him a DNRCC-A. We will have our Oncology review his chart and provide some direction for any further treatment and guidelines as these symptoms are a result of his continued progression of his cancer. He stated he does not want to be a burden to his family. He does understand Quality of Life verses Quantity. He understand that at some time we will need to discuss end of life if not working or he decides he is ready. He would like to establish care locally with Oncology.      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  ERCP  8/7/2020    ERCP STENT INSERTION performed by Torsten Harrell MD at [address] Left 09/18/2020    left subclavian port insertion    PORT SURGERY Left 9/18/2020    PORT INSERTION, SUBCLAVIAN, W/C-ARM performed by Darlene Khan DO at [address] N/A 10/15/2021    EGD BIOPSY performed by Macario Dempsey MD at [facility] OR       Medications Prior to Admission:    Prior to Admission medications    Medication Sig Start Date End Date Taking? Authorizing Provider   bisacodyl (DULCOLAX) 10 MG suppository Place 1 suppository rectally daily as needed for Constipation 5/16/22 6/15/22  Jamal Loop, APRN - CNP   polyethylene glycol (GLYCOLAX) 17 g packet Take 17 g by mouth daily as needed for Constipation 5/16/22 6/15/22  Jamal Loop, APRN - CNP   pantoprazole (PROTONIX) 40 MG tablet Take 1 tablet by mouth every morning (before breakfast) 5/17/22   Jamal Loop, APRN - CNP   scopolamine (TRANSDERM-SCOP) transdermal patch Place 1 patch onto the skin every 72 hours 1.5 mg patch delivers 1 mg over 3 days. Apply patch to hairless area behind the ear. 5/16/22   Jamal Loop, APRN - CNP   dexamethasone (DECADRON) 1 MG tablet Take 2 mg by mouth daily    Historical Provider, MD   HYDROmorphone (DILAUDID) 2 MG tablet Take 2 mg by mouth every 4 hours as needed.     Historical Provider, MD   metoclopramide (REGLAN) 10 MG tablet Take 1 tablet by mouth 4 times daily as needed (Nausea/vomiting) 5/5/22   Burgess Ryan MD   ondansetron (ZOFRAN ODT) 4 MG disintegrating tablet Take 1 tablet by mouth every 8 hours as needed for Nausea or Vomiting 4/5/22   JUAN CARLOS Hawkins CNP   Nutritional Supplements (BOOST) LIQD Take 1 Can by mouth daily 4/5/22   JUAN CARLOS Hawkins CNP   lidocaine-prilocaine (EMLA) 2.5-2.5 % cream  1/11/22   Historical Provider, MD   nicotine (Jonita Linda) 14 MG/24HR  11/16/21   Historical Provider, MD   Probiotic Product (PROBIOTIC-10 PO) Take by mouth daily    Historical Provider, MD   Multiple Vitamins-Minerals (THERAPEUTIC MULTIVITAMIN-MINERALS) tablet Take 1 tablet by mouth daily    Historical Provider, MD       Allergies:  Asa [aspirin] and Chocolate    Social History:   TOBACCO:   reports that he has been smoking cigarettes. He has a 5.00 pack-year smoking history. He has never used smokeless tobacco.  ETOH:   reports previous alcohol use of about 5.8 standard drinks of alcohol per week. Family History:       Problem Relation Age of Onset    Hypertension Mother     Stroke Mother     High Blood Pressure Mother     Diabetes Father     Hypertension Father     High Blood Pressure Father     Heart Disease Father        Allergies:  Asa [aspirin] and Chocolate    Medications Prior to Admission:    Prior to Admission medications    Medication Sig Start Date End Date Taking? Authorizing Provider   bisacodyl (DULCOLAX) 10 MG suppository Place 1 suppository rectally daily as needed for Constipation 5/16/22 6/15/22  JUAN CARLOS Olivares CNP   polyethylene glycol (GLYCOLAX) 17 g packet Take 17 g by mouth daily as needed for Constipation 5/16/22 6/15/22  JUAN CARLOS Olivares CNP   pantoprazole (PROTONIX) 40 MG tablet Take 1 tablet by mouth every morning (before breakfast) 5/17/22   JUAN CARLOS Olivares CNP   scopolamine (TRANSDERM-SCOP) transdermal patch Place 1 patch onto the skin every 72 hours 1.5 mg patch delivers 1 mg over 3 days.  Apply patch to hairless area behind the ear. 5/16/22   JUAN CARLOS Vásquez CNP   dexamethasone (DECADRON) 1 MG tablet Take 2 mg by mouth daily    Historical Provider, MD   HYDROmorphone (DILAUDID) 2 MG tablet Take 2 mg by mouth every 4 hours as needed. Historical Provider, MD   metoclopramide (REGLAN) 10 MG tablet Take 1 tablet by mouth 4 times daily as needed (Nausea/vomiting) 5/5/22   Kody Fuentes MD   ondansetron (ZOFRAN ODT) 4 MG disintegrating tablet Take 1 tablet by mouth every 8 hours as needed for Nausea or Vomiting 4/5/22   Debbrah Kayser Might, APRN - CNP   Nutritional Supplements (BOOST) LIQD Take 1 Can by mouth daily 4/5/22   Debbrah Kayser Might, APRN - CNP   lidocaine-prilocaine (EMLA) 2.5-2.5 % cream  1/11/22   Historical Provider, MD   nicotine (Donna Genera) 14 MG/24HR  11/16/21   Historical Provider, MD   Probiotic Product (PROBIOTIC-10 PO) Take by mouth daily    Historical Provider, MD   Multiple Vitamins-Minerals (THERAPEUTIC MULTIVITAMIN-MINERALS) tablet Take 1 tablet by mouth daily    Historical Provider, MD       Review of Systems:  Constitutional:negative  for fevers, and negative for chills. Eyes: negative for visual disturbance   ENT: negative for sore throat, negative nasal congestion, and negative for earache  Respiratory: negative for shortness of breath, negative for cough, and negative for wheezing  Cardiovascular: negative for chest pain, negative for palpitations, and negative for syncope  Gastrointestinal: positive for abdominal pain, positive for nausea,positive for vomiting, negative for diarrhea, negative for constipation, and negative for hematochezia or melena  Genitourinary: negative for dysuria, negative for urinary urgency, negative for urinary frequency, and negative for hematuria  Skin: negative for skin rash, and negative for skin lesions  Neurological: negative for unilateral weakness, numbness or tingling.     Physical Exam:    Vitals:   Temp: 97.3 °F (36.3 °C)  BP: 114/85  Resp: 22  Pulse: 81  SpO2: 97 %  24HR INTAKE/OUTPUT:  No intake or output data in the 24 hours ending 05/18/22 1004    Weight    Body mass index is 21.74 kg/m². Exam:  GEN:    Awake, alert and oriented x3. EYES:  EOMI, pupils equal   NECK: Supple. No lymphadenopathy. No carotid bruit  CVS:    regular rate and rhythm, no audible murmur  PULM:  CTA, no wheezes, rales or rhonchi, no acute respiratory distress  ABD:    Bowels sounds normal.  Abdomen is soft. Distension. no tenderness to palpation. EXT:   no edema bilaterally . No calf tenderness. NEURO: Moves all extremities. Motor and sensory are grossly intact  SKIN:  No rashes.   No skin lesions.    -----------------------------------------------------------------  Diagnostic Data:     DATA:    CBC:   Lab Results   Component Value Date    WBC 6.9 05/18/2022    RBC 3.93 (L) 05/18/2022    HGB 13.5 05/18/2022    HCT 39.9 (L) 05/18/2022    .5 05/18/2022     05/18/2022        CMP:   Lab Results   Component Value Date    GLUCOSE 95 05/18/2022    BUN 15 05/18/2022    CREATININE 0.44 (L) 05/18/2022     05/18/2022    K 3.0 (L) 05/18/2022    CALCIUM 8.7 05/18/2022    CL 93 (L) 05/18/2022    CO2 33 (H) 05/18/2022    PROT 6.5 05/18/2022    LABALBU 3.2 (L) 05/18/2022    BILITOT 1.24 (H) 05/18/2022    ALKPHOS 246 (H) 05/18/2022    ALT 17 05/18/2022    AST 26 05/18/2022       UA:   Lab Results   Component Value Date    COLORU Yellow 05/13/2022    CLARITYU Clear 04/17/2022    SPECGRAV 1.020 05/13/2022    WBCUA 0 TO 2 05/13/2022    RBCUA 0 TO 2 05/13/2022    EPITHUA 0 TO 2 05/13/2022    LEUKOCYTESUR NEGATIVE 05/13/2022    GLUCOSEU NEGATIVE 05/13/2022    BLOODU Negative 04/17/2022    KETUA TRACE (A) 05/13/2022    PROTEINU TRACE (A) 05/13/2022    HGBUR NEGATIVE 05/13/2022    CASTUA NOT REPORTED 10/09/2021    CRYSTUA NOT REPORTED 10/09/2021    BACTERIA 1+ (A) 05/13/2022    YEAST NOT REPORTED 10/09/2021       Lactic Acid:   Lab Results   Component Value Date LACTA 1.2 10/09/2021       D-Dimer:  Lab Results   Component Value Date    DDIMER 0.47 10/10/2021       PT/INR:  Lab Results   Component Value Date    PROTIME 14.4 05/18/2022    INR 1.1 05/18/2022       High Sensitivity Troponin:  No results for input(s): TROPHS in the last 72 hours. ABGs:   Lab Results   Component Value Date    FIO2 NOT REPORTED 11/07/2020           CT ABDOMEN PELVIS W IV CONTRAST Additional Contrast? None   Final Result   1. Interval development of a moderate volume of malignant appearing ascites   throughout the abdomen and pelvis, with some areas of mild loculation as well   as questionable nodularity noted along loops of bowel for which it is   difficult to exclude peritoneal metastasis. 2. There is significant wall thickening involving the gastric body, with   chronic area of thickening and stricture involving the gastroduodenal   junction as noted on the previous examination. Though this may be related to   gastritis and possible peptic ulcer disease, metastatic involvement is   difficult to exclude. 3. Multiple significantly dilated loops of small bowel are noted with no   discrete transition point, with some nodular thickening along the small bowel   walls concerning for possible metastatic involvement. 4. Fluid is seen within the distal thoracic esophagus which may be related to   reflux. 5. Patchy airspace disease in the lung bases bilaterally including a bandlike   area of subsegmental disease in the left lower lobe which may represent   atelectasis or pneumonia. 6. Diffuse atherosclerotic disease. EKG reviewed        Assessment:    Principal Problem:    Intractable vomiting with nausea  Active Problems:    Severe malnutrition (HCC)    Peritoneal metastases (HCC)    Dehydration    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[address]    Abdominal pain    Intrahepatic cholangiocarcinoma ([address]  Resolved Problems:    * No resolved hospital problems.  *      Patient Active Problem List Diagnosis Date Noted    Dehydration 05/18/2022    Peritoneal metastases ([facility] 05/16/2022    Severe malnutrition ([facility] 05/14/2022    Intractable vomiting with nausea 05/13/2022    Intrahepatic cholangiocarcinoma ([facility] 11/16/2021    Abdominal pain 10/11/2021    Esophageal varices without bleeding ([facility] 08/07/2020    Intrahepatic bile duct dilation 08/07/2020    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 08/07/2020    Elevated bilirubin 08/07/2020    Elevated CA 19-9 level 08/07/2020    Alcohol abuse 08/07/2020    Gastroesophageal reflux disease without esophagitis 08/06/2020    Alcohol use 08/06/2020    Portal hypertension ([facility] 08/06/2020    Positive FIT (fecal immunochemical test) 07/03/2020       Plan:     · This patient requires inpatient admission because of intractable nausea and vomiting  · Factors affecting the medical complexity of this patient include dehydration, 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  · Estimated length of stay is 3 days  · Intractable nausea and vomiting  · IVF  · Cl diet  · Dehydration  · IVF at 50 ml/hr  · Stage IV intrahepatic cholangiocarcinoma with peritoneal metastasis  · Paracentesis today--3550 ml removed  · Appreciate heme-onc  · No further treatment  · Hospice care  · Pain management  · DVT prophylaxis: TEDs  · Peptic ulcer prophylaxis: Pepcid  · High risk medications: none  · Social Service and Case Management consults for DC planning  · Dietician consult initiated    CORE MEASURES  DVT prophylaxis: TEDs  Decubitus ulcer present on admission: No  CODE STATUS: DNR-CCA  Nutrition Status: poor  Physical therapy: Yes   Old Charts reviewed: Yes  EKG Reviewed:  Yes  Advance Directive Addressed: Yes    JUAN CARLOS Peters - CNP, APRN, NP-C  5/18/2022, 10:04 AM

## 2022-05-18 NOTE — ED NOTES
Dr. Feliciano Borrero called back regarding Dr. Crissy Sousa's page.       Rohini Story  05/18/22 6331

## 2022-05-18 NOTE — PROGRESS NOTES
[facility]  Inpatient/Observation/Outpatient Rehabilitation    Date: 2022  Patient Name: Radha Clancy       [x] Inpatient Acute/Observation       []  Outpatient  : 1960       [] Pt no showed for scheduled appointment    [] Pt refused/declined therapy at this time due to:           [x] Pt cancelled due to:  [] No Reason Given   [] Sick/ill   [x] Other: Per nurse, pt \"out of it\" after procedure, leaving for hospice later this afternoon. Therapist/Assistant will attempt to see this patient, at our earliest opportunity.        Zoya Ferrara, PT Date: 2022

## 2022-05-18 NOTE — PROGRESS NOTES
Comprehensive Nutrition Assessment    Type and Reason for Visit:  Initial,Positive Nutrition Screen    Nutrition Recommendations/Plan:   1. High calorie/protein eating  2. Small frequent meals/snacks  3. Two Varinder HN tid (when diet advances)     Malnutrition Assessment:  Malnutrition Status:  Severe malnutrition (05/18/22 0753)    Context:  Acute Illness     Findings of the 6 clinical characteristics of malnutrition:  Energy Intake:  50% or less of estimated energy requirements for 5 or more days  Weight Loss:  Greater than 5% over 1 month     Body Fat Loss: Moderate body fat loss Fat Overlying Ribs,Orbital   Muscle Mass Loss: Moderate muscle mass loss Clavicles (pectoralis & deltoids),Temples (temporalis)  Fluid Accumulation:  Unable to assess     Strength:  Not Performed    Nutrition Assessment:    Severe malnutrition (acute on chronic) r/t inadequate nutrient intakes, AEB significant weight losses, low oral intakes and moderate fat/muscle losses. Tolerating clear liquids thus far. Reports difficulty getting medications upon d/c last admission, after feeling good at discharge. Agreeable to two varinder HN supplement to bolster nutrition intakes (once diet advances to full liquid or better). Reinforced high varinder/protein foods/beverages with him upon visit. Nutrition Related Findings:    cachectic Wound Type: None       Current Nutrition Intake & Therapies:    Average Meal Intake: Unable to assess (no PO records)  Average Supplements Intake: None Ordered  ADULT DIET; Clear Liquid  ADULT ORAL NUTRITION SUPPLEMENT; Breakfast, Lunch, Dinner; Other Oral Supplement; Two Varinder HN    Anthropometric Measures:  Height: 5' 9\" (175.3 cm)  Ideal Body Weight (IBW): 160 lbs (73 kg)    Admission Body Weight: 147 lb 3.2 oz (66.8 kg)  Current Body Weight: 147 lb 3.2 oz (66.8 kg), 92 % IBW.  Weight Source: Bed Scale  Current BMI (kg/m2): 21.7  Usual Body Weight: 160 lb (72.6 kg) (a month ago)  % Weight Change (Calculated): -8  Weight Adjustment For: No Adjustment                 BMI Categories: Normal Weight (BMI 18.5-24. 9)    Estimated Daily Nutrient Needs:  Energy Requirements Based On: Kcal/kg  Weight Used for Energy Requirements: Current  Energy (kcal/day): 0097-2118 (30-35)  Weight Used for Protein Requirements: Current  Protein (g/day): 100-120 (1.5-1.8)  Method Used for Fluid Requirements: 1 ml/kcal  Fluid (ml/day): 2400    Nutrition Diagnosis:   · Severe malnutrition related to inadequate protein-energy intake as evidenced by poor intake prior to admission,weight loss greater than or equal to 5% in 1 month,moderate loss of subcutaneous fat,moderate muscle loss    Lab Results   Component Value Date     05/18/2022    K 3.0 (L) 05/18/2022    CL 93 (L) 05/18/2022    CO2 33 (H) 05/18/2022    BUN 15 05/18/2022    CREATININE 0.44 (L) 05/18/2022    GLUCOSE 95 05/18/2022    CALCIUM 8.7 05/18/2022    PROT 6.5 05/18/2022    LABALBU 3.2 (L) 05/18/2022    BILITOT 1.24 (H) 05/18/2022    ALKPHOS 246 (H) 05/18/2022    AST 26 05/18/2022    ALT 17 05/18/2022    LABGLOM >60 05/18/2022    GFRAA >60 05/18/2022     No results found for: LABA1C  No results found for: EAG  No results found for: VITD25    Nutrition Interventions:   Food and/or Nutrient Delivery: Continue Current Diet,Start Oral Nutrition Supplement  Nutrition Education/Counseling: Education initiated  Coordination of Nutrition Care: Continue to monitor while inpatient  Plan of Care discussed with: patient    Goals:     Goals: Meet at least 75% of estimated needs     Nutrition Monitoring and Evaluation:   Behavioral-Environmental Outcomes: None Identified  Food/Nutrient Intake Outcomes: Food and Nutrient Intake,Supplement Intake  Physical Signs/Symptoms Outcomes: Biochemical Data,Weight,Nausea or Vomiting    Discharge Planning:    Continue Oral Nutrition Supplement     Richard Rivera, [address] [place] [address],   Contact: [phone]

## 2022-05-19 VITALS
BODY MASS INDEX: 21.51 KG/M2 | TEMPERATURE: 99.2 F | DIASTOLIC BLOOD PRESSURE: 73 MMHG | WEIGHT: 145.2 LBS | SYSTOLIC BLOOD PRESSURE: 113 MMHG | HEIGHT: 69 IN | RESPIRATION RATE: 18 BRPM | HEART RATE: 109 BPM | OXYGEN SATURATION: 93 %

## 2022-05-19 PROCEDURE — 94761 N-INVAS EAR/PLS OXIMETRY MLT: CPT

## 2022-05-19 PROCEDURE — 97161 PT EVAL LOW COMPLEX 20 MIN: CPT

## 2022-05-19 PROCEDURE — A4216 STERILE WATER/SALINE, 10 ML: HCPCS | Performed by: NURSE PRACTITIONER

## 2022-05-19 PROCEDURE — 2500000003 HC RX 250 WO HCPCS: Performed by: NURSE PRACTITIONER

## 2022-05-19 PROCEDURE — 6360000002 HC RX W HCPCS: Performed by: INTERNAL MEDICINE

## 2022-05-19 PROCEDURE — 2580000003 HC RX 258: Performed by: NURSE PRACTITIONER

## 2022-05-19 PROCEDURE — 6370000000 HC RX 637 (ALT 250 FOR IP): Performed by: INTERNAL MEDICINE

## 2022-05-19 PROCEDURE — 6360000002 HC RX W HCPCS: Performed by: NURSE PRACTITIONER

## 2022-05-19 RX ORDER — ONDANSETRON 8 MG/1
8 TABLET, ORALLY DISINTEGRATING ORAL EVERY 8 HOURS PRN
Qty: 30 TABLET | Refills: 2 | Status: SHIPPED | OUTPATIENT
Start: 2022-05-19

## 2022-05-19 RX ORDER — LORAZEPAM 2 MG/ML
1 CONCENTRATE ORAL EVERY 8 HOURS PRN
Qty: 30 ML | Refills: 0 | Status: SHIPPED | OUTPATIENT
Start: 2022-05-19 | End: 2022-06-02

## 2022-05-19 RX ORDER — PANTOPRAZOLE SODIUM 40 MG/1
40 TABLET, DELAYED RELEASE ORAL
Qty: 30 TABLET | Refills: 3 | Status: SHIPPED | OUTPATIENT
Start: 2022-05-19

## 2022-05-19 RX ORDER — METOCLOPRAMIDE 10 MG/1
10 TABLET ORAL 4 TIMES DAILY PRN
Qty: 15 TABLET | Refills: 3 | Status: SHIPPED | OUTPATIENT
Start: 2022-05-19

## 2022-05-19 RX ORDER — OLANZAPINE 2.5 MG/1
2.5 TABLET ORAL NIGHTLY
Qty: 30 TABLET | Refills: 3 | Status: SHIPPED | OUTPATIENT
Start: 2022-05-19

## 2022-05-19 RX ORDER — SCOLOPAMINE TRANSDERMAL SYSTEM 1 MG/1
1 PATCH, EXTENDED RELEASE TRANSDERMAL
Qty: 10 PATCH | Refills: 2 | Status: SHIPPED | OUTPATIENT
Start: 2022-05-19

## 2022-05-19 RX ORDER — ONDANSETRON 2 MG/ML
8 INJECTION INTRAMUSCULAR; INTRAVENOUS EVERY 6 HOURS PRN
Status: DISCONTINUED | OUTPATIENT
Start: 2022-05-19 | End: 2022-05-19 | Stop reason: HOSPADM

## 2022-05-19 RX ORDER — MORPHINE SULFATE 100 MG/5ML
10 SOLUTION ORAL
Qty: 30 ML | Refills: 0 | Status: SHIPPED | OUTPATIENT
Start: 2022-05-19 | End: 2022-05-22

## 2022-05-19 RX ADMIN — METOCLOPRAMIDE 5 MG: 5 INJECTION, SOLUTION INTRAMUSCULAR; INTRAVENOUS at 00:21

## 2022-05-19 RX ADMIN — ENOXAPARIN SODIUM 40 MG: 100 INJECTION SUBCUTANEOUS at 08:19

## 2022-05-19 RX ADMIN — MORPHINE SULFATE 4 MG: 4 INJECTION, SOLUTION INTRAMUSCULAR; INTRAVENOUS at 10:20

## 2022-05-19 RX ADMIN — LORAZEPAM 1 MG: 2 INJECTION INTRAMUSCULAR; INTRAVENOUS at 13:13

## 2022-05-19 RX ADMIN — ONDANSETRON 4 MG: 2 INJECTION INTRAMUSCULAR; INTRAVENOUS at 12:40

## 2022-05-19 RX ADMIN — FAMOTIDINE 20 MG: 10 INJECTION INTRAVENOUS at 08:19

## 2022-05-19 RX ADMIN — SODIUM CHLORIDE: 9 INJECTION, SOLUTION INTRAVENOUS at 00:27

## 2022-05-19 RX ADMIN — MORPHINE SULFATE 4 MG: 4 INJECTION, SOLUTION INTRAMUSCULAR; INTRAVENOUS at 13:13

## 2022-05-19 RX ADMIN — METOCLOPRAMIDE 5 MG: 5 INJECTION, SOLUTION INTRAMUSCULAR; INTRAVENOUS at 12:04

## 2022-05-19 RX ADMIN — DEXAMETHASONE SODIUM PHOSPHATE 2 MG: 4 INJECTION, SOLUTION INTRAMUSCULAR; INTRAVENOUS at 08:19

## 2022-05-19 RX ADMIN — METOCLOPRAMIDE 5 MG: 5 INJECTION, SOLUTION INTRAMUSCULAR; INTRAVENOUS at 05:07

## 2022-05-19 RX ADMIN — MORPHINE SULFATE 4 MG: 4 INJECTION, SOLUTION INTRAMUSCULAR; INTRAVENOUS at 05:16

## 2022-05-19 RX ADMIN — MORPHINE SULFATE 4 MG: 4 INJECTION, SOLUTION INTRAMUSCULAR; INTRAVENOUS at 00:21

## 2022-05-19 ASSESSMENT — PAIN DESCRIPTION - ORIENTATION
ORIENTATION: MID

## 2022-05-19 ASSESSMENT — PAIN DESCRIPTION - LOCATION
LOCATION: ABDOMEN

## 2022-05-19 ASSESSMENT — PAIN - FUNCTIONAL ASSESSMENT
PAIN_FUNCTIONAL_ASSESSMENT: ACTIVITIES ARE NOT PREVENTED
PAIN_FUNCTIONAL_ASSESSMENT: ACTIVITIES ARE NOT PREVENTED

## 2022-05-19 ASSESSMENT — PAIN SCALES - GENERAL
PAINLEVEL_OUTOF10: 10
PAINLEVEL_OUTOF10: 3
PAINLEVEL_OUTOF10: 3
PAINLEVEL_OUTOF10: 5
PAINLEVEL_OUTOF10: 6

## 2022-05-19 ASSESSMENT — PAIN DESCRIPTION - DESCRIPTORS
DESCRIPTORS: ACHING;STABBING
DESCRIPTORS: ACHING;STABBING;CRAMPING
DESCRIPTORS: CRAMPING;STABBING
DESCRIPTORS: CRAMPING;STABBING

## 2022-05-19 NOTE — PROGRESS NOTES
Lengthy conversation with patient and spouse at the bedside. Michelle Haas states that he is feeling much better than yesterday. Pain and anxiety are under control with the current medications. States that many of his family came in yesterday to see him. He shares with them that he is not seeking curative treatment anymore and discusses how this was a difficult conversation for him to have with them. Support provided. He shares stories of his time in the service. Thanked for his service and given  blanket. States that hospice will be meeting them at home once they are discharged. Denies further needs.

## 2022-05-19 NOTE — PROGRESS NOTES
Physical Therapy  Facility/Department: [facility] MED SURG  Physical Therapy Initial Assessment    Name: Livia Leong  : 1960  MRN: [id number]  Date of Service: 2022    Discharge Recommendations:      PT Equipment Recommendations  Equipment Needed: No      Patient Diagnosis(es): The primary encounter diagnosis was Cholangiocarcinoma [facility]). Diagnoses of Intractable vomiting with nausea, unspecified vomiting type, Dehydration, Hypokalemia, 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[address] Peritoneal metastases ([address] and Hospice care were also pertinent to this visit. Past Medical History:  has a past medical history of Anxiety, Aspiration pneumonia due to inhalation of vomitus ([address] Chronic back pain, Depression, Gastroesophageal reflux disease without esophagitis, Intrahepatic cholangiocarcinoma ([address] 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[address] Peritoneal metastases ([address] and Severe malnutrition ([address] Past Surgical History:  has a past surgical history that includes Mandible surgery; ERCP (2020); ERCP (2020); Port Surgery (Left, 2020); Port Surgery (Left, 2020); Upper gastrointestinal endoscopy (N/A, 10/15/2021); and Cholecystectomy. Assessment   Assessment: Pt is a pleasent 62 yo man who presents with N/v. Pt is IND with all mobility and notes no pain or dysfunction. Pt ambulated 15ft IND with no AD in room. Pt does not need therapy services at this time. Decision Making: Low Complexity  No Skilled PT: Independent with functional mobility   Requires PT Follow-Up: No  Activity Tolerance  Activity Tolerance: Patient tolerated evaluation without incident;Patient tolerated treatment well     Plan   Plan  Plan: Discharge  Safety Devices  Type of Devices:  All fall risk precautions in place,Left in bed,Call light within reach       Subjective   General  Chart Reviewed: Yes  Patient assessed for rehabilitation services?: Yes  Family / Caregiver Present: Yes         Social/Functional History  Social/Functional History  Lives With: Spouse  Type of Home: House  Home Layout: Two level,Able to Live on Main level with bedroom/bathroom  Home Access: Stairs to enter without rails  Entrance Stairs - Number of Steps: 1  Bathroom Shower/Tub: Walk-in shower  Bathroom Toilet: Handicap height  Bathroom Equipment: Grab bars in [facility] chair  Home Equipment: Cane  ADL Assistance: [address]: Independent  Homemaking Responsibilities: Yes  Ambulation Assistance: Independent  Transfer Assistance: Independent  Active : No  Mode of Transportation: Car  Vision/Hearing  Vision Exceptions: Wears glasses at all times  Hearing: Within functional limits    Cognition   Orientation  Overall Orientation Status: Within Functional Limits  Cognition  Overall Cognitive Status: WFL     Objective   Pulse: 109  Heart Rate Source: Monitor; Apical  BP: 113/73  BP Location: Right upper arm  Patient Position: Semi fowlers  MAP (Calculated): 86.33  Resp: 18  SpO2: 93 %  O2 Device: None (Room air)     Observation/Palpation  Posture: Good        AROM RLE (degrees)  RLE AROM: WNL  AROM LLE (degrees)  LLE AROM : WNL  Strength RLE  Strength RLE: WNL  Strength LLE  Strength LLE: WNL           Bed mobility  Supine to Sit: Independent  Sit to Supine: Independent  Scooting: Independent  Transfers  Sit to Stand: Independent  Stand to sit:  Independent  Ambulation  WB Status: FWB  Ambulation  Surface: level tile  Device: No Device  Assistance: Independent  Gait Deviations: None  Distance: 15 feet     Balance  Posture: Good  Sitting - Static: Good  Sitting - Dynamic: Good  Standing - Static: Good  Standing - Dynamic: Good           AM-PAC Score     AM-PAC Inpatient Mobility without Stair Climbing Raw Score : 20 (05/19/22 1456)  AM-PAC Inpatient without Stair Climbing T-Scale Score : 60.57 (05/19/22 1456)  Mobility Inpatient CMS 0-100% Score: 0 (05/19/22 1456)  Mobility Inpatient without Stair CMS G-Code Modifier : [facility] (05/19/22 1456)       Goals  Long Term Goals  Long term goal 1: Pt to be IND with all mobility for safe return home.   Patient Goals   Patient goals : to go home       Education  Patient Education  Education Given To: Family  Education Provided: Role of Therapy  Education Method: Verbal  Barriers to Learning: None  Education Outcome: Verbalized understanding      Therapy Time   Individual Concurrent Group Co-treatment   Time In 1012         Time Out 1022         Minutes 10         Timed Code Treatment Minutes: [address], PT

## 2022-05-19 NOTE — DISCHARGE SUMMARY
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 *      Hospital Course:   Keren Walker is a 63 y.o. male admitted with intractable nausea and vomiting. He presented to the emergency room with complaints of abdominal pain with nausea and vomiting. Patient was discharged 2 days prior for same complaints. 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 Patient was discharged on his last admission with Protonix and scopolamine patches however he stated the pharmacy was unable to fill those and did not call to have them sent elsewhere. CT abdomen and pelvis showed moderate volume of malignant ascites throughout abdomen and pelvis. There was significant wall thickening involving the gastric body with chronic area thickening and stricture involving the gastroduodenal junction. There is some concern for possible metastasis involvement of the small bowel walls and fluid seen in the distal thoracic esophagus. During his admission oncology was consulted to assist with evaluation. Patient's medications were adjusted. Paracentesis was completed which improved his discomfort. He does tolerate clear liquids occasionally. His pain is controlled and he has been counseled on the need to control his symptoms. Dr. Lucio Matos and myself had a lengthy conversation with him and his wife regarding his unfortunate diagnosis of stage 4 Intrahepatic cholangiocarcinoma with mets to peritoneal cavity. He understands that it is not curable.   He does not wish to have intubation or CPR. At this time we will make him a DNRCC-A. He stated he does not want to be a burden to his family. He does understand Quality of Life verses Quantity. He understand that at some time we will need to discuss end of life if not working or he decides he is ready. He would like to establish care locally with Oncology. After all conversations and consultation with oncology recommendation was for hospice and comfort care only. Patient was agreeable to this plan of care and [facility] with family. [facility] will [facility]. Prescriptions were sent to [address] [place] [address] and I did call and speak with the pharmacist regarding all prescriptions she did verify that they do have all medications and will have the Ativan tomorrow. I did review this with the patient and family they voiced her understanding to this. Consultants:  Dr. Olive Urrutia, heme/onc    Procedures: none    Complications: none    Discharge Condition: stable    Exam:  GEN:    Awake, alert and oriented x3. EYES:   EOMI, pupils equal   NECK: Supple. No lymphadenopathy. No carotid bruit  CVS:     regular rate and rhythm, no audible murmur  PULM:  CTA, no wheezes, rales or rhonchi, no acute respiratory distress  ABD:     Bowels sounds normal.  Abdomen is soft. No distention. no tenderness to palpation. EXT:     no edema bilaterally . No calf tenderness. NEURO: Moves all extremities. Motor and sensory are grossly intact  SKIN:    No rashes. No skin lesions.      Significant Diagnostic Studies:   Lab Results   Component Value Date    WBC 6.9 05/18/2022    HGB 13.5 05/18/2022     05/18/2022       Lab Results   Component Value Date    BUN 15 05/18/2022    CREATININE 0.44 (L) 05/18/2022     05/18/2022    K 3.0 (L) 05/18/2022    CALCIUM 8.7 05/18/2022    CL 93 (L) 05/18/2022    CO2 33 (H) 05/18/2022    LABGLOM >60 05/18/2022       Lab Results   Component Value Date WBCUA 0 TO 2 05/13/2022    RBCUA 0 TO 2 05/13/2022    EPITHUA 0 TO 2 05/13/2022    LEUKOCYTESUR NEGATIVE 05/13/2022    SPECGRAV 1.020 05/13/2022    GLUCOSEU NEGATIVE 05/13/2022    KETUA TRACE (A) 05/13/2022    PROTEINU TRACE (A) 05/13/2022    HGBUR NEGATIVE 05/13/2022    CASTUA NOT REPORTED 10/09/2021    CRYSTUA NOT REPORTED 10/09/2021    BACTERIA 1+ (A) 05/13/2022    YEAST NOT REPORTED 10/09/2021       CT ABDOMEN PELVIS W IV CONTRAST Additional Contrast? None    Result Date: 5/18/2022  EXAMINATION: CT OF THE ABDOMEN AND PELVIS WITH CONTRAST 5/18/2022 2:29 am TECHNIQUE: CT of the abdomen and pelvis was performed with the administration of intravenous contrast. Multiplanar reformatted images are provided for review. Automated exposure control, iterative reconstruction, and/or weight based adjustment of the mA/kV was utilized to reduce the radiation dose to as low as reasonably achievable. COMPARISON: 10/09/2021 HISTORY: ORDERING SYSTEM PROVIDED HISTORY: H/O cholangiocarcinoma; concern for SBO TECHNOLOGIST PROVIDED HISTORY: H/O cholangiocarcinoma; concern for SBO Decision Support Exception - unselect if not a suspected or confirmed emergency medical condition->Emergency Medical Condition (MA) FINDINGS: Lower Chest: Fluid is seen in the distal thoracic esophagus. No pericardial effusion. Coronary artery atherosclerosis. Bandlike opacity at the left lung base may represent atelectasis or pneumonia. Patchy bibasilar airspace disease. Organs: Liver contour appears unchanged from the previous examination. No splenic mass is identified. Numerous hepatic cysts are again identified within the liver. There appears to be dilated loop of bowel seen in the liset hepatis region which may be related to postoperative change from prior hepaticojejunostomy. Interval development of bilateral hydroureteronephrosis. No solid renal mass is identified. Mild left-sided hydronephrosis.   The degree of obstruction on the right appears moderate. GI/Bowel: Interval worsening of gastric fold thickening involving the mid gastric body, with the chronic area of stricture seen involving the distal stomach and proximal duodenum. There also numerous loops of small bowel which demonstrate dilation, as well as nodular appearing wall thickening. There is no discrete transition point. There is stool seen within the colon which appears to represent a mild amount. Pelvis: The bladder appears grossly unremarkable. Prostate enlargement. Prostate calcification is identified. Pelvic ascites. Peritoneum/Retroperitoneum: No aortic aneurysm. No dissection. Interval development of a moderate volume of ascites throughout the abdomen and pelvis with some areas of loculation. There is also nodular thickening seen along and adjacent to several loops of bowel suspicious for potential peritoneal metastasis. Bones/Soft Tissues: No acute osseous fracture is identified. Multilevel degenerative changes are seen within the spine. No osseous destructive process is identified. 1. Interval development of a moderate volume of malignant appearing ascites throughout the abdomen and pelvis, with some areas of mild loculation as well as questionable nodularity noted along loops of bowel for which it is difficult to exclude peritoneal metastasis. 2. There is significant wall thickening involving the gastric body, with chronic area of thickening and stricture involving the gastroduodenal junction as noted on the previous examination. Though this may be related to gastritis and possible peptic ulcer disease, metastatic involvement is difficult to exclude. 3. Multiple significantly dilated loops of small bowel are noted with no discrete transition point, with some nodular thickening along the small bowel walls concerning for possible metastatic involvement. 4. Fluid is seen within the distal thoracic esophagus which may be related to reflux.  5. Patchy airspace disease in the lung bases bilaterally including a bandlike area of subsegmental disease in the left lower lobe which may represent atelectasis or pneumonia. 6. Diffuse atherosclerotic disease. US GUIDED PARACENTESIS    Result Date: 5/18/2022  PROCEDURE: PARACENTESIS WITHOUT IMAGE GUIDANCE US ABDOMEN LIMITED 5/18/2022 HISTORY: ORDERING SYSTEM PROVIDED HISTORY: ascites; abdominal discomfort TECHNIQUE: Informed consent was obtained after a detailed explanation of the procedure including risks, benefits, and alternatives. Universal protocol was observed including a time-out confirming the correct patient and procedure. Informed consent was obtained after a detailed explanation of the procedure including risks, benefits, and alternatives. Universal protocol was followed. Limited abdominal ultrasound was performed and the intended catheter insertion site was marked. The left abdomen was prepped and draped in sterile fashion and local anesthesia was achieved with lidocaine. A 5 Fijian one-step Intoloop catheter was advanced into ascites and paracentesis was performed. The catheter was removed and a sterile dressing was applied. The patient tolerated the procedure well. No immediate complication. Estimated blood loss: Less than 0 mL FINDINGS: Limited transabdominal ultrasound demonstrates ascites. 3.6 L dutch ascites was removed. Successful paracentesis.        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 08/06/2020    Positive FIT (fecal immunochemical test) 07/03/2020        Discharge Medications:         Medication List      START taking these medications    LORazepam 2 MG/ML concentrated solution  Commonly known as: LORazepam intensol  Take 0.5 mLs by mouth every 8 hours as needed (restlessness) for up to 14 days. morphine sulfate 20 MG/ML concentrated oral solution  Take 0.5 mLs by mouth every 2 hours as needed for Pain for up to 3 days. OLANZapine 2.5 MG tablet  Commonly known as: ZYPREXA  Take 1 tablet by mouth nightly        CHANGE how you take these medications    ondansetron 8 MG Tbdp disintegrating tablet  Commonly known as: Zofran ODT  Take 1 tablet by mouth every 8 hours as needed for Nausea or Vomiting  What changed:   · medication strength  · how much to take        CONTINUE taking these medications    bisacodyl 10 MG suppository  Commonly known as: DULCOLAX  Place 1 suppository rectally daily as needed for Constipation     Boost Liqd  Take 1 Can by mouth daily     dexamethasone 1 MG tablet  Commonly known as: DECADRON     lidocaine-prilocaine 2.5-2.5 % cream  Commonly known as: EMLA     metoclopramide 10 MG tablet  Commonly known as: Reglan  Take 1 tablet by mouth 4 times daily as needed (Nausea/vomiting)     nicotine 14 MG/24HR  Commonly known as: NICODERM CQ     pantoprazole 40 MG tablet  Commonly known as: PROTONIX  Take 1 tablet by mouth every morning (before breakfast)     polyethylene glycol 17 g packet  Commonly known as: GLYCOLAX  Take 17 g by mouth daily as needed for Constipation     scopolamine transdermal patch  Commonly known as: TRANSDERM-SCOP  Place 1 patch onto the skin every 72 hours 1.5 mg patch delivers 1 mg over 3 days. Apply patch to hairless area behind the ear.         STOP taking these medications    HYDROmorphone 2 MG tablet  Commonly known as: DILAUDID     PROBIOTIC-10 PO     therapeutic multivitamin-minerals tablet           Where to Get Your Medications      These medications were sent to [address] [facility], [facility]  [address] [place] [address]    Phone: [phone]   · LORazepam 2 MG/ML concentrated solution  · metoclopramide 10 MG tablet  · morphine sulfate 20 MG/ML concentrated oral solution  · OLANZapine 2.5 MG tablet  · ondansetron 8 MG Tbdp disintegrating tablet  · pantoprazole 40 MG tablet  · scopolamine transdermal patch         Patient Instructions:    Activity: activity as tolerated  Diet: encourage fluids  Wound Care: none needed  Other: None    Disposition:   DC to home with hospice    Follow up:  Patient will be followed by [address] [place] [address], APRN - CNP in 1-2 weeks    CORE MEASURES on Discharge (if applicable)  ACE/ARB in CHF: NA  Statin in MI: NA  ASA in MI: NA  Statin in CVA: NA  Antiplatelet in CVA: NA    Total time spent on discharge services: 40 minutes    Including the following activities:  Evaluation and Management of patient  Discussion with patient and/or surrogate about current care plan  Coordination with Case Management and/or   Coordination of care with Consultants (if applicable)   Coordination of care with Receiving Facility Physician (if applicable)  Completion of DME forms (if applicable)  Preparation of Discharge Summary  Preparation of Medication Reconciliation  Preparation of Discharge Prescriptions    Signed:  JUAN CARLOS Conte CNP, JUAN CARLOS, NP-C  5/19/2022, 12:24 PM

## 2022-05-19 NOTE — PROGRESS NOTES
Labs     05/18/22 0138   WBC 6.9   RBC 3.93*   HGB 13.5   HCT 39.9*   .5   MCH 34.4*   MCHC 33.8   RDW 13.2      MPV 9.3        Last 3 Blood Glucose:   Recent Labs     05/18/22  0138   GLUCOSE 95        Comprehensive Metabolic Profile:   Recent Labs     05/18/22 0138      K 3.0*   CL 93*   CO2 33*   BUN 15   CREATININE 0.44*   GLUCOSE 95   CALCIUM 8.7   PROT 6.5   LABALBU 3.2*   BILITOT 1.24*   ALKPHOS 246*   AST 26   ALT 17        Urinalysis:   Lab Results   Component Value Date    NITRU NEGATIVE 05/13/2022    COLORU Yellow 05/13/2022    PHUR 7.0 05/13/2022    WBCUA 0 TO 2 05/13/2022    WBCUA 0-5 04/17/2022    RBCUA 0 TO 2 05/13/2022    RBCUA None Seen 04/17/2022    MUCUS 2+ 05/13/2022    TRICHOMONAS NOT REPORTED 10/09/2021    YEAST NOT REPORTED 10/09/2021    BACTERIA 1+ 05/13/2022    CLARITYU Clear 04/17/2022    SPECGRAV 1.020 05/13/2022    LEUKOCYTESUR NEGATIVE 05/13/2022    UROBILINOGEN Normal 05/13/2022    BILIRUBINUR SMALL 05/13/2022    BILIRUBINUR Negative 04/17/2022    BLOODU Negative 04/17/2022    GLUCOSEU NEGATIVE 05/13/2022    KETUA TRACE 05/13/2022    AMORPHOUS NOT REPORTED 10/09/2021       HgBA1c:  No results found for: LABA1C    Lactic Acid:   Lab Results   Component Value Date    LACTA 1.2 10/09/2021    LACTA 0.6 11/07/2020        Troponin: No results for input(s): TROPONINI in the last 72 hours. CRP:  No results for input(s): CRP in the last 72 hours. Radiology/Imaging:  US GUIDED PARACENTESIS   Final Result   Successful paracentesis. CT ABDOMEN PELVIS W IV CONTRAST Additional Contrast? None   Final Result   1. Interval development of a moderate volume of malignant appearing ascites   throughout the abdomen and pelvis, with some areas of mild loculation as well   as questionable nodularity noted along loops of bowel for which it is   difficult to exclude peritoneal metastasis.    2. There is significant wall thickening involving the gastric body, with   chronic area of thickening and stricture involving the gastroduodenal   junction as noted on the previous examination. Though this may be related to   gastritis and possible peptic ulcer disease, metastatic involvement is   difficult to exclude. 3. Multiple significantly dilated loops of small bowel are noted with no   discrete transition point, with some nodular thickening along the small bowel   walls concerning for possible metastatic involvement. 4. Fluid is seen within the distal thoracic esophagus which may be related to   reflux. 5. Patchy airspace disease in the lung bases bilaterally including a bandlike   area of subsegmental disease in the left lower lobe which may represent   atelectasis or pneumonia. 6. Diffuse atherosclerotic disease. ASSESSMENT / PLAN:  Intractable vomiting with nausea  · Continue current therapy   ? IVF  ? Cl diet  · Dehydration  ? IVF at 50 ml/hr  · Stage IV intrahepatic cholangiocarcinoma with peritoneal metastasis  ? Paracentesis today--3550 ml removed  ? Appreciate heme-onc  § No further treatment  § Hospice care  ?  Pain management  · Nutrition status:   · severe malnutrition  · Dietician consult initiated  · Hospital Prophylaxis:   · DVT: Teds   · Stress Ulcer: PPI   · High risk medications: none   · Disposition:    · Discharge plan is home with Hospice today      JUAN CARLOS Matthews CNP , JUAN CARLOS, NP-C  Hospitalist Medicine        5/19/2022, 8:04 AM

## 2022-05-19 NOTE — DISCHARGE INSTR - DIET
Good nutrition is important when healing from an illness, injury, or surgery. Follow any nutrition recommendations given to you during your hospital stay. If you were given an oral nutrition supplement while in the hospital, continue to take this supplement at home. You can take it with meals, in-between meals, and/or before bedtime. These supplements can be purchased at most local grocery stores, pharmacies, and chain Inf[employer]-stores. If you have any questions about your diet or nutrition, call the hospital and ask for the dietitian.         Diet as tolerated, encourage fluids

## 2022-05-19 NOTE — CARE COORDINATION
05/19/22 1001   Readmission Assessment   Number of Days since last admission? 1-7 days   Previous Disposition Home with Family   Who is being Interviewed Patient   Did you visit your Primary Care Physician after you left the hospital, before you returned this time? No   Why weren't you able to visit your PCP? Other (Comment)  (appointment on 5/27)   Did you see a specialist, such as Cardiac, Pulmonary, Orthopedic Physician, etc. after you left the hospital? No   Who advised the patient to return to the hospital? Self-referral   Does the patient report anything that got in the way of taking their medications? No   In our efforts to provide the best possible care to you and others like you, can you think of anything that we could have done to help you after you left the hospital the first time, so that you might not have needed to return so soon?  Other (Comment)  (Pharmacy need to let docyor know that they could not get the medication for a couple of days.)

## 2022-05-19 NOTE — PROGRESS NOTES
Patient resting in bed denies needs, states pain is 3 on 0-10  Scale, denies nausea.  Vitals and assessment completed and in chart

## 2022-05-19 NOTE — PROGRESS NOTES
[facility]    Date: 2022  Patient Name: Jamison Holstein       [] Inpatient Acute/Observation       []  Outpatient  : 1960       [] Pt no showed for scheduled appointment    [x] Pt declined therapy. Initial OT tx in am, patient declined d/t just receiving pain medications and requested to come back in PM. During PM attempt, patient declined d/t being discharged home. Patient has no questions/concerns re: return home at this time.        [] Pt cancelled due to:  [] No Reason Given   [] Sick/ill   [] Other:    Jefry Rizo OTR/L Date: 2022

## 2022-05-23 ENCOUNTER — TELEPHONE (OUTPATIENT)
Dept: PRIMARY CARE CLINIC | Age: 62
End: 2022-05-23

## 2022-05-23 NOTE — TELEPHONE ENCOUNTER
[address] Transitions Initial Follow Up Call    Outreach made within 2 business days of discharge: Yes    Patient: Michelle Avelar Patient : 1960   MRN: [id number]  Reason for Admission: There are no discharge diagnoses documented for the most recent discharge. Discharge Date: 22       Spoke with: Rubbie Klinefelter     Discharge department/facility: [facility]     TCM Interactive Patient Contact:  Was patient able to fill all prescriptions: No: none sent home  Was patient instructed to bring all medications to the follow-up visit: Yes  Is patient taking all medications as directed in the discharge summary? Yes  Does patient understand their discharge instructions: Yes  Does patient have questions or concerns that need addressed prior to 7-14 day follow up office visit: no. Dio Rodriguez appointment on 22    Scheduled appointment with PCP within 7-14 days    Follow Up  Future Appointments   Date Time Provider Martell Bergman   2022 10:40 AM Scott Violette Might, APRN - CNP Tiff Prim Ca [facility]   10/6/2022  8:00 AM JUAN CARLOS Díaz CNP SAIMA Mortensen MA

## (undated) DEVICE — GARMENT,MEDLINE,DVT,INT,CALF,MED, GEN2: Brand: MEDLINE

## (undated) DEVICE — GAMMEX® NON-LATEX PI ORTHO SIZE 7, STERILE POLYISOPRENE POWDER-FREE SURGICAL GLOVE: Brand: GAMMEX

## (undated) DEVICE — CHLORAPREP 26ML ORANGE

## (undated) DEVICE — DEVICE TRNSF SPIK STL 900T] MEDICAL SPECIALTIES DISTRIBUTORS]

## (undated) DEVICE — SUTURE MCRYL + SZ 4-0 L27IN ABSRB UD L19MM PS-2 3/8 CIR MCP426H

## (undated) DEVICE — INTENDED FOR TISSUE SEPARATION, AND OTHER PROCEDURES THAT REQUIRE A SHARP SURGICAL BLADE TO PUNCTURE OR CUT.: Brand: BARD-PARKER ® CARBON RIB-BACK BLADES

## (undated) DEVICE — SYRINGE MED 10ML LUERLOCK TIP W/O SFTY DISP

## (undated) DEVICE — SOLUTION IV 100ML 0.9% SOD CHL CONT USP MINI-BAG +

## (undated) DEVICE — SYRINGE MED 5ML STD CLR PLAS LUERLOCK TIP N CTRL DISP

## (undated) DEVICE — PENCIL ES ULT VAC W TELSCP NOSE EZ CLN BLDE 10FT

## (undated) DEVICE — WIREGUIDED CYTOLOGY BRUSH: Brand: RX CYTOLOGY BRUSH

## (undated) DEVICE — SUTURE PROL SZ 3-0 L18IN NONABSORBABLE BLU L30MM FS-1 3/8 8663G

## (undated) DEVICE — SUTURE VCRL + SZ 3-0 L27IN ABSRB UD L26MM SH 1/2 CIR VCP416H

## (undated) DEVICE — GUIDEWIRE ENDO L260CM DIA0.035IN BILI STD HI PERF STR RND

## (undated) DEVICE — DRAPE PED 77INX108IN REINF FENEST ARMBRD

## (undated) DEVICE — GUIDEWIRE ENDOSCP ANGLED 0.025 INX450 CM STD RND JAGWIRE

## (undated) DEVICE — DRAPE C ARM CLP FOR GE 9600 9800

## (undated) DEVICE — BASIC PACK: Brand: MEDLINE INDUSTRIES, INC.

## (undated) DEVICE — DECANTER FLD 9IN ST BG FOR ASEP TRNSF OF FLD

## (undated) DEVICE — SPHINCTEROTOME: Brand: DREAMTOME™ RX 44

## (undated) DEVICE — SKIN AFFIX SURG ADHESIVE 72/CS 0.55ML: Brand: MEDLINE

## (undated) DEVICE — NEEDLE HYPO 22GA L1.5IN BLK S STL HUB POLYPR SHLD REG BVL